# Patient Record
Sex: MALE | Race: BLACK OR AFRICAN AMERICAN | NOT HISPANIC OR LATINO | URBAN - METROPOLITAN AREA
[De-identification: names, ages, dates, MRNs, and addresses within clinical notes are randomized per-mention and may not be internally consistent; named-entity substitution may affect disease eponyms.]

---

## 2023-12-11 ENCOUNTER — OUTPATIENT (OUTPATIENT)
Dept: OUTPATIENT SERVICES | Facility: HOSPITAL | Age: 83
LOS: 1 days | Discharge: STILL ADMITTED OR ADMINISTRATIVE | End: 2023-12-11

## 2023-12-11 DIAGNOSIS — F60.0 PARANOID PERSONALITY DISORDER: ICD-10-CM

## 2024-01-12 ENCOUNTER — OUTPATIENT (OUTPATIENT)
Dept: OUTPATIENT SERVICES | Facility: HOSPITAL | Age: 84
LOS: 1 days | Discharge: HOME | End: 2024-01-12
Payer: MEDICARE

## 2024-01-12 PROCEDURE — 90792 PSYCH DIAG EVAL W/MED SRVCS: CPT

## 2024-02-01 PROCEDURE — 99204 OFFICE O/P NEW MOD 45 MIN: CPT

## 2024-02-18 ENCOUNTER — INPATIENT (INPATIENT)
Facility: HOSPITAL | Age: 84
LOS: 17 days | Discharge: NURSING, REHAB OR LONG-TERM CARE | End: 2024-03-07
Attending: INTERNAL MEDICINE | Admitting: INTERNAL MEDICINE
Payer: MEDICARE

## 2024-02-18 VITALS
OXYGEN SATURATION: 98 % | SYSTOLIC BLOOD PRESSURE: 176 MMHG | HEART RATE: 86 BPM | RESPIRATION RATE: 20 BRPM | TEMPERATURE: 98 F | DIASTOLIC BLOOD PRESSURE: 94 MMHG

## 2024-02-18 LAB
ALBUMIN SERPL ELPH-MCNC: 3.7 G/DL — SIGNIFICANT CHANGE UP (ref 3.3–5)
ALP SERPL-CCNC: 106 U/L — SIGNIFICANT CHANGE UP (ref 40–120)
ALT FLD-CCNC: 18 U/L — SIGNIFICANT CHANGE UP (ref 4–41)
ANION GAP SERPL CALC-SCNC: 12 MMOL/L — SIGNIFICANT CHANGE UP (ref 7–14)
APPEARANCE UR: CLEAR — SIGNIFICANT CHANGE UP
AST SERPL-CCNC: 21 U/L — SIGNIFICANT CHANGE UP (ref 4–40)
BACTERIA # UR AUTO: NEGATIVE /HPF — SIGNIFICANT CHANGE UP
BASOPHILS # BLD AUTO: 0.05 K/UL — SIGNIFICANT CHANGE UP (ref 0–0.2)
BASOPHILS NFR BLD AUTO: 0.6 % — SIGNIFICANT CHANGE UP (ref 0–2)
BILIRUB SERPL-MCNC: 0.3 MG/DL — SIGNIFICANT CHANGE UP (ref 0.2–1.2)
BILIRUB UR-MCNC: NEGATIVE — SIGNIFICANT CHANGE UP
BUN SERPL-MCNC: 18 MG/DL — SIGNIFICANT CHANGE UP (ref 7–23)
CALCIUM SERPL-MCNC: 9.2 MG/DL — SIGNIFICANT CHANGE UP (ref 8.4–10.5)
CAST: 0 /LPF — SIGNIFICANT CHANGE UP (ref 0–4)
CHLORIDE SERPL-SCNC: 106 MMOL/L — SIGNIFICANT CHANGE UP (ref 98–107)
CO2 SERPL-SCNC: 24 MMOL/L — SIGNIFICANT CHANGE UP (ref 22–31)
COLOR SPEC: YELLOW — SIGNIFICANT CHANGE UP
CREAT SERPL-MCNC: 1.05 MG/DL — SIGNIFICANT CHANGE UP (ref 0.5–1.3)
DIFF PNL FLD: NEGATIVE — SIGNIFICANT CHANGE UP
EGFR: 71 ML/MIN/1.73M2 — SIGNIFICANT CHANGE UP
EOSINOPHIL # BLD AUTO: 0.28 K/UL — SIGNIFICANT CHANGE UP (ref 0–0.5)
EOSINOPHIL NFR BLD AUTO: 3.2 % — SIGNIFICANT CHANGE UP (ref 0–6)
FLUAV AG NPH QL: SIGNIFICANT CHANGE UP
FLUBV AG NPH QL: SIGNIFICANT CHANGE UP
GLUCOSE SERPL-MCNC: 94 MG/DL — SIGNIFICANT CHANGE UP (ref 70–99)
GLUCOSE UR QL: NEGATIVE MG/DL — SIGNIFICANT CHANGE UP
HCT VFR BLD CALC: 40.9 % — SIGNIFICANT CHANGE UP (ref 39–50)
HGB BLD-MCNC: 13.2 G/DL — SIGNIFICANT CHANGE UP (ref 13–17)
IANC: 6.27 K/UL — SIGNIFICANT CHANGE UP (ref 1.8–7.4)
IMM GRANULOCYTES NFR BLD AUTO: 0.2 % — SIGNIFICANT CHANGE UP (ref 0–0.9)
KETONES UR-MCNC: NEGATIVE MG/DL — SIGNIFICANT CHANGE UP
LEUKOCYTE ESTERASE UR-ACNC: NEGATIVE — SIGNIFICANT CHANGE UP
LYMPHOCYTES # BLD AUTO: 1.28 K/UL — SIGNIFICANT CHANGE UP (ref 1–3.3)
LYMPHOCYTES # BLD AUTO: 14.8 % — SIGNIFICANT CHANGE UP (ref 13–44)
MCHC RBC-ENTMCNC: 29.4 PG — SIGNIFICANT CHANGE UP (ref 27–34)
MCHC RBC-ENTMCNC: 32.3 GM/DL — SIGNIFICANT CHANGE UP (ref 32–36)
MCV RBC AUTO: 91.1 FL — SIGNIFICANT CHANGE UP (ref 80–100)
MONOCYTES # BLD AUTO: 0.76 K/UL — SIGNIFICANT CHANGE UP (ref 0–0.9)
MONOCYTES NFR BLD AUTO: 8.8 % — SIGNIFICANT CHANGE UP (ref 2–14)
NEUTROPHILS # BLD AUTO: 6.27 K/UL — SIGNIFICANT CHANGE UP (ref 1.8–7.4)
NEUTROPHILS NFR BLD AUTO: 72.4 % — SIGNIFICANT CHANGE UP (ref 43–77)
NITRITE UR-MCNC: NEGATIVE — SIGNIFICANT CHANGE UP
NRBC # BLD: 0 /100 WBCS — SIGNIFICANT CHANGE UP (ref 0–0)
NRBC # FLD: 0 K/UL — SIGNIFICANT CHANGE UP (ref 0–0)
PH UR: 7 — SIGNIFICANT CHANGE UP (ref 5–8)
PLATELET # BLD AUTO: 191 K/UL — SIGNIFICANT CHANGE UP (ref 150–400)
POTASSIUM SERPL-MCNC: 4.5 MMOL/L — SIGNIFICANT CHANGE UP (ref 3.5–5.3)
POTASSIUM SERPL-SCNC: 4.5 MMOL/L — SIGNIFICANT CHANGE UP (ref 3.5–5.3)
PROT SERPL-MCNC: 6.9 G/DL — SIGNIFICANT CHANGE UP (ref 6–8.3)
PROT UR-MCNC: 30 MG/DL
RBC # BLD: 4.49 M/UL — SIGNIFICANT CHANGE UP (ref 4.2–5.8)
RBC # FLD: 13.6 % — SIGNIFICANT CHANGE UP (ref 10.3–14.5)
RBC CASTS # UR COMP ASSIST: 1 /HPF — SIGNIFICANT CHANGE UP (ref 0–4)
RSV RNA NPH QL NAA+NON-PROBE: SIGNIFICANT CHANGE UP
SARS-COV-2 RNA SPEC QL NAA+PROBE: SIGNIFICANT CHANGE UP
SODIUM SERPL-SCNC: 142 MMOL/L — SIGNIFICANT CHANGE UP (ref 135–145)
SP GR SPEC: 1.01 — SIGNIFICANT CHANGE UP (ref 1–1.03)
SQUAMOUS # UR AUTO: 0 /HPF — SIGNIFICANT CHANGE UP (ref 0–5)
UROBILINOGEN FLD QL: 0.2 MG/DL — SIGNIFICANT CHANGE UP (ref 0.2–1)
WBC # BLD: 8.66 K/UL — SIGNIFICANT CHANGE UP (ref 3.8–10.5)
WBC # FLD AUTO: 8.66 K/UL — SIGNIFICANT CHANGE UP (ref 3.8–10.5)
WBC UR QL: 0 /HPF — SIGNIFICANT CHANGE UP (ref 0–5)

## 2024-02-18 PROCEDURE — 70450 CT HEAD/BRAIN W/O DYE: CPT | Mod: 26,MA

## 2024-02-18 PROCEDURE — 71045 X-RAY EXAM CHEST 1 VIEW: CPT | Mod: 26

## 2024-02-18 PROCEDURE — 99285 EMERGENCY DEPT VISIT HI MDM: CPT

## 2024-02-18 RX ORDER — LEVETIRACETAM 250 MG/1
2500 TABLET, FILM COATED ORAL ONCE
Refills: 0 | Status: DISCONTINUED | OUTPATIENT
Start: 2024-02-18 | End: 2024-02-18

## 2024-02-18 NOTE — ED ADULT NURSE NOTE - NS ED NURSE RECORD ANOTHER HT AND WT
Pt reports to ED c/c sinus pressure, HA, and bilateral burning to eyes. PT denies taking any medication for pain. No

## 2024-02-18 NOTE — ED ADULT NURSE NOTE - CHIEF COMPLAINT QUOTE
Pt AOX3, Hx of dementia; arrives from [facility] due to new onset aggressive behavior and lack of sleep; pt c/o intermittent chest pain/discomfort in past few days; according to aide he has not eaten properly or slept properly in 48 hrs; pt ambulates with walker; denies any urinary changes; pt has redness in Left eye s/p cataract surgery approx 2 weeks ago; pt calm and cooperative in triage, bgl 101  Contact Son Regino Jalloh

## 2024-02-18 NOTE — ED ADULT TRIAGE NOTE - CHIEF COMPLAINT QUOTE
Pt AOX3, Hx of dementia; arrives from [facility] due to new onset aggressive behavior and lack of sleep; pt c/o intermittent chest pain/discomfort in past few days; according to aide he has not eaten properly or slept properly in 48 hrs; pt ambulates with walker; denies any urinary changes; pt calm and cooperative in triage, bgl 146  Contact Son Regino Jalloh  Pt AOX3, Hx of dementia; arrives from [facility] due to new onset aggressive behavior and lack of sleep; pt c/o intermittent chest pain/discomfort in past few days; according to aide he has not eaten properly or slept properly in 48 hrs; pt ambulates with walker; denies any urinary changes; pt calm and cooperative in triage, bgl 101  Contact Son Regino Jalloh  Pt AOX3, Hx of dementia; arrives from [facility] due to new onset aggressive behavior and lack of sleep; pt c/o intermittent chest pain/discomfort in past few days; according to aide he has not eaten properly or slept properly in 48 hrs; pt ambulates with walker; denies any urinary changes; pt has redness in Left eye s/p cataract surgery approx 2 weeks ago; pt calm and cooperative in triage, bgl 101  Contact Son Regino Jalloh

## 2024-02-18 NOTE — ED PROVIDER NOTE - CARE PLAN
Principal Discharge DX:	Abdominal pain   1 Principal Discharge DX:	Agitation   Principal Discharge DX:	Agitation  Secondary Diagnosis:	Dementia

## 2024-02-18 NOTE — ED PROVIDER NOTE - PROGRESS NOTE DETAILS
Jesse BURCIAGA, PGY-1;    Received signoutu on this patient.  83-year-old male history of dementia and hypertension presenting with agitation.  Denies baseline per nursing report.  Awaiting CT read and UA.  Ultimately may require psych eval.  Evaluated patient is not currently agitated, vitals within normal limits. Jesse BURCIAGA, PGY-1;    Received sign out on this patient.  83-year-old male history of dementia and hypertension presenting with agitation.  Denies baseline per nursing report.  Awaiting CT read and UA.  Ultimately may require psych eval.  Evaluated patient is not currently agitated, vitals within normal limits. Jesse BURCIAGA, PGY-1;    Discussed case with surgery, will admit to their service, potential plan for ERCP. Jesse BURCIAGA, PGY-1;    Discussed case with GI, will evaluate patient in AM Jesse BURCIAGA, PGY-1;    Patient would benefit for admission due to being sent in due to agitation from psychiatrist.

## 2024-02-18 NOTE — ED PROVIDER NOTE - PHYSICAL EXAMINATION
Constitutional: VS reviewed. Alert and orientedx2, well appearing, no apparent distress  HEENT: Atraumatic, EOMI, PERRL, + subconjunctival hemorrhage  CV: RRR  Lungs: Clear and equal bilaterally, no wheezes, rales or crackles  Abdomen: Soft, nondistended, nontender  MSK: No deformities  Skin: Warm and dry. As visualized no rashes, lesions, bruising or erythema  Neuro: Strength 5/5 in all extremities. Sensation intact.   Lymph: No pitting edema in extremities.

## 2024-02-18 NOTE — ED ADULT NURSE NOTE - NSFALLUNIVINTERV_ED_ALL_ED
Bed/Stretcher in lowest position, wheels locked, appropriate side rails in place/Call bell, personal items and telephone in reach/Instruct patient to call for assistance before getting out of bed/chair/stretcher/Non-slip footwear applied when patient is off stretcher/[place] to call system/Physically safe environment - no spills, clutter or unnecessary equipment/Purposeful proactive rounding/Room/bathroom lighting operational, light cord in reach

## 2024-02-18 NOTE — ED PROVIDER NOTE - OBJECTIVE STATEMENT
84 y/o M dementia, cataracts s/p cataract removal surgery 2 weeks presents to ED from the [facility] for 3 days of agitation. Pt is accompanied by aide who is helping to provide history. She states that patient has had intermittent episodes of agitation and aggression over last few days. Pt kicks and yells which is not patient's normal behavior. Pt has been eating normally. No n/v, diarrhea/constipation.     Spoke with son Regino Jalloh who states that patient has been having about 1-2 weeks of insomnia. He has endorses the agitation that the aide endorses. He also states that the patient had witnessed fall at nursing home. Pt is on Seroquel at night for last 2 months. Pt is followed by Dr. Mahan, geriatric psychiatrist at [facility]
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
## 2024-02-18 NOTE — ED PROVIDER NOTE - CLINICAL SUMMARY MEDICAL DECISION MAKING FREE TEXT BOX
82 y/o M dementia, cataracts s/p cataract removal surgery 2 weeks presents to ED from the [facility] for 3 days of agitation. Pt has had episodes of aggression. Per son pt also having increased insomnia over last 1 week. Pt well appearing. No focal neuro deficits. Differentials include but not limited to infection, UTI, worsening dementia. Plan for labs, CTH, UA/UC. Will consider psych eval. Dispo pending labs and imaging.
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
## 2024-02-19 DIAGNOSIS — H26.9 UNSPECIFIED CATARACT: ICD-10-CM

## 2024-02-19 DIAGNOSIS — I10 ESSENTIAL (PRIMARY) HYPERTENSION: ICD-10-CM

## 2024-02-19 DIAGNOSIS — Z98.49 CATARACT EXTRACTION STATUS, UNSPECIFIED EYE: Chronic | ICD-10-CM

## 2024-02-19 DIAGNOSIS — G47.00 INSOMNIA, UNSPECIFIED: ICD-10-CM

## 2024-02-19 DIAGNOSIS — R10.9 UNSPECIFIED ABDOMINAL PAIN: ICD-10-CM

## 2024-02-19 DIAGNOSIS — F03.90 UNSPECIFIED DEMENTIA WITHOUT BEHAVIORAL DISTURBANCE: ICD-10-CM

## 2024-02-19 DIAGNOSIS — Z29.9 ENCOUNTER FOR PROPHYLACTIC MEASURES, UNSPECIFIED: ICD-10-CM
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
## 2024-02-19 DIAGNOSIS — R03.0 ELEVATED BLOOD-PRESSURE READING, WITHOUT DIAGNOSIS OF HYPERTENSION: ICD-10-CM

## 2024-02-19 DIAGNOSIS — E86.0 DEHYDRATION: ICD-10-CM

## 2024-02-19 DIAGNOSIS — R41.82 ALTERED MENTAL STATUS, UNSPECIFIED: ICD-10-CM

## 2024-02-19 DIAGNOSIS — G93.40 ENCEPHALOPATHY, UNSPECIFIED: ICD-10-CM

## 2024-02-19 LAB
24R-OH-CALCIDIOL SERPL-MCNC: 32.8 NG/ML — SIGNIFICANT CHANGE UP (ref 30–80)
A1C WITH ESTIMATED AVERAGE GLUCOSE RESULT: 5.3 % — SIGNIFICANT CHANGE UP (ref 4–5.6)
ANION GAP SERPL CALC-SCNC: 11 MMOL/L — SIGNIFICANT CHANGE UP (ref 7–14)
BUN SERPL-MCNC: 14 MG/DL — SIGNIFICANT CHANGE UP (ref 7–23)
CALCIUM SERPL-MCNC: 9.2 MG/DL — SIGNIFICANT CHANGE UP (ref 8.4–10.5)
CHLORIDE SERPL-SCNC: 108 MMOL/L — HIGH (ref 98–107)
CHOLEST SERPL-MCNC: 200 MG/DL — HIGH
CO2 SERPL-SCNC: 23 MMOL/L — SIGNIFICANT CHANGE UP (ref 22–31)
CREAT SERPL-MCNC: 0.94 MG/DL — SIGNIFICANT CHANGE UP (ref 0.5–1.3)
CULTURE RESULTS: SIGNIFICANT CHANGE UP
EGFR: 81 ML/MIN/1.73M2 — SIGNIFICANT CHANGE UP
ESTIMATED AVERAGE GLUCOSE: 105 — SIGNIFICANT CHANGE UP
GLUCOSE BLDC GLUCOMTR-MCNC: 104 MG/DL — HIGH (ref 70–99)
GLUCOSE SERPL-MCNC: 98 MG/DL — SIGNIFICANT CHANGE UP (ref 70–99)
HDLC SERPL-MCNC: 81 MG/DL — SIGNIFICANT CHANGE UP
LIPID PNL WITH DIRECT LDL SERPL: 108 MG/DL — HIGH
MAGNESIUM SERPL-MCNC: 1.9 MG/DL — SIGNIFICANT CHANGE UP (ref 1.6–2.6)
NON HDL CHOLESTEROL: 119 MG/DL — SIGNIFICANT CHANGE UP
PHOSPHATE SERPL-MCNC: 3.2 MG/DL — SIGNIFICANT CHANGE UP (ref 2.5–4.5)
POTASSIUM SERPL-MCNC: 4.4 MMOL/L — SIGNIFICANT CHANGE UP (ref 3.5–5.3)
POTASSIUM SERPL-SCNC: 4.4 MMOL/L — SIGNIFICANT CHANGE UP (ref 3.5–5.3)
SODIUM SERPL-SCNC: 142 MMOL/L — SIGNIFICANT CHANGE UP (ref 135–145)
SPECIMEN SOURCE: SIGNIFICANT CHANGE UP
TRIGL SERPL-MCNC: 56 MG/DL — SIGNIFICANT CHANGE UP
TSH SERPL-MCNC: 2.8 UIU/ML — SIGNIFICANT CHANGE UP (ref 0.27–4.2)

## 2024-02-19 PROCEDURE — G0316 PROLONG INPT EVAL ADD15 M: CPT

## 2024-02-19 PROCEDURE — 99223 1ST HOSP IP/OBS HIGH 75: CPT | Mod: GC

## 2024-02-19 RX ORDER — SIMVASTATIN 20 MG/1
1 TABLET, FILM COATED ORAL
Refills: 0 | DISCHARGE

## 2024-02-19 RX ORDER — PREDNISOLONE SODIUM PHOSPHATE 1 %
1 DROPS OPHTHALMIC (EYE) DAILY
Refills: 0 | Status: COMPLETED | OUTPATIENT
Start: 2024-02-19 | End: 2024-02-21

## 2024-02-19 RX ORDER — LORATADINE 10 MG/1
1 TABLET ORAL
Refills: 0 | DISCHARGE

## 2024-02-19 RX ORDER — QUETIAPINE FUMARATE 200 MG/1
25 TABLET, FILM COATED ORAL ONCE
Refills: 0 | Status: COMPLETED | OUTPATIENT
Start: 2024-02-19 | End: 2024-02-19

## 2024-02-19 RX ORDER — SODIUM CHLORIDE 9 MG/ML
1000 INJECTION, SOLUTION INTRAVENOUS ONCE
Refills: 0 | Status: COMPLETED | OUTPATIENT
Start: 2024-02-19 | End: 2024-02-19

## 2024-02-19 RX ORDER — PETROLATUM,WHITE
1 JELLY (GRAM) TOPICAL
Refills: 0 | Status: DISCONTINUED | OUTPATIENT
Start: 2024-02-19 | End: 2024-03-07

## 2024-02-19 RX ORDER — SENNA PLUS 8.6 MG/1
2 TABLET ORAL
Refills: 0 | DISCHARGE

## 2024-02-19 RX ORDER — LANOLIN ALCOHOL/MO/W.PET/CERES
1 CREAM (GRAM) TOPICAL
Refills: 0 | DISCHARGE

## 2024-02-19 RX ORDER — AMLODIPINE BESYLATE 2.5 MG/1
5 TABLET ORAL DAILY
Refills: 0 | Status: DISCONTINUED | OUTPATIENT
Start: 2024-02-19 | End: 2024-03-07

## 2024-02-19 RX ORDER — LANOLIN ALCOHOL/MO/W.PET/CERES
3 CREAM (GRAM) TOPICAL AT BEDTIME
Refills: 0 | Status: DISCONTINUED | OUTPATIENT
Start: 2024-02-19 | End: 2024-03-07

## 2024-02-19 RX ORDER — FOLIC ACID 0.8 MG
1 TABLET ORAL DAILY
Refills: 0 | Status: DISCONTINUED | OUTPATIENT
Start: 2024-02-19 | End: 2024-03-07

## 2024-02-19 RX ORDER — FOLIC ACID 0.8 MG
1 TABLET ORAL
Refills: 0 | DISCHARGE

## 2024-02-19 RX ORDER — QUETIAPINE FUMARATE 200 MG/1
25 TABLET, FILM COATED ORAL AT BEDTIME
Refills: 0 | Status: DISCONTINUED | OUTPATIENT
Start: 2024-02-19 | End: 2024-02-20

## 2024-02-19 RX ORDER — ENOXAPARIN SODIUM 100 MG/ML
40 INJECTION SUBCUTANEOUS EVERY 24 HOURS
Refills: 0 | Status: DISCONTINUED | OUTPATIENT
Start: 2024-02-19 | End: 2024-03-07

## 2024-02-19 RX ORDER — SODIUM CHLORIDE 9 MG/ML
1000 INJECTION INTRAMUSCULAR; INTRAVENOUS; SUBCUTANEOUS
Refills: 0 | Status: DISCONTINUED | OUTPATIENT
Start: 2024-02-19 | End: 2024-02-20

## 2024-02-19 RX ORDER — SENNA PLUS 8.6 MG/1
2 TABLET ORAL AT BEDTIME
Refills: 0 | Status: DISCONTINUED | OUTPATIENT
Start: 2024-02-19 | End: 2024-03-07

## 2024-02-19 RX ORDER — MEMANTINE HYDROCHLORIDE 10 MG/1
5 TABLET ORAL DAILY
Refills: 0 | Status: DISCONTINUED | OUTPATIENT
Start: 2024-02-19 | End: 2024-03-07

## 2024-02-19 RX ORDER — POLYETHYLENE GLYCOL 3350 17 G/17G
17 POWDER, FOR SOLUTION ORAL
Refills: 0 | DISCHARGE

## 2024-02-19 RX ORDER — MEMANTINE HYDROCHLORIDE 10 MG/1
1 TABLET ORAL
Refills: 0 | DISCHARGE

## 2024-02-19 RX ADMIN — SODIUM CHLORIDE 75 MILLILITER(S): 9 INJECTION INTRAMUSCULAR; INTRAVENOUS; SUBCUTANEOUS at 21:00

## 2024-02-19 RX ADMIN — SENNA PLUS 2 TABLET(S): 8.6 TABLET ORAL at 21:01

## 2024-02-19 RX ADMIN — Medication 1 APPLICATION(S): at 20:22

## 2024-02-19 RX ADMIN — SODIUM CHLORIDE 500 MILLILITER(S): 9 INJECTION, SOLUTION INTRAVENOUS at 06:21

## 2024-02-19 RX ADMIN — QUETIAPINE FUMARATE 25 MILLIGRAM(S): 200 TABLET, FILM COATED ORAL at 21:02

## 2024-02-19 RX ADMIN — QUETIAPINE FUMARATE 25 MILLIGRAM(S): 200 TABLET, FILM COATED ORAL at 01:16

## 2024-02-19 RX ADMIN — MEMANTINE HYDROCHLORIDE 5 MILLIGRAM(S): 10 TABLET ORAL at 12:08

## 2024-02-19 RX ADMIN — Medication 1 MILLIGRAM(S): at 12:08

## 2024-02-19 RX ADMIN — SODIUM CHLORIDE 75 MILLILITER(S): 9 INJECTION INTRAMUSCULAR; INTRAVENOUS; SUBCUTANEOUS at 17:30

## 2024-02-19 RX ADMIN — Medication 2 DROP(S): at 17:25

## 2024-02-19 RX ADMIN — Medication 1 DROP(S): at 12:08

## 2024-02-19 RX ADMIN — Medication 1 APPLICATION(S): at 21:02

## 2024-02-19 RX ADMIN — Medication 3 MILLIGRAM(S): at 21:02

## 2024-02-19 RX ADMIN — SODIUM CHLORIDE 500 MILLILITER(S): 9 INJECTION, SOLUTION INTRAVENOUS at 09:12

## 2024-02-19 NOTE — PROGRESS NOTE ADULT - PROBLEM SELECTOR PLAN 8
DVT: Lovenox   Diet: DASH with Regular Consistency  Pharmacy:  Dispo: medically active, f/u Psych recs if requiring [facility] admission DVT: Lovenox   Diet: DASH with Regular Consistency  Pharmacy:  Dispo: medically active, f/u Psych recs if requiring [facility] admission    Discussed with son Regino and TAYEA 2/18.

## 2024-02-19 NOTE — H&P ADULT - PROBLEM SELECTOR PLAN 3
Hx of Dementia, possible Alzheimer's.     > c/w home Memantine and Seroquel as above. Hx of Dementia, possible Alzheimer's. Follow with [facility] Dr. Mahan at [facility].     > c/w home Memantine and Seroquel as above. Report of insomnia for the past ~ 2 weeks, although patient refutes this and states he sleeps 24 hours  - likelihood that this is contributing to the agitation/acute encephalopathy  - unclear if insomnia is being triggered by some other issue, for example, eye pain/discomfort, constipation, headache (since patient has dementia and may not be able to recall irritants)    > Ophthalmology consult in the AM (s/p cataract surgeries recently, per reports)  > F/up AM lab-work  > f/u TSH, vitamin D level  > continuing with psychotropic medications  > trial of melatonin

## 2024-02-19 NOTE — H&P ADULT - PROBLEM SELECTOR PLAN 8
DVT: Lovenox   Diet: DASH with Regular Consistency  Pharmacy:  Dispo: medically active, f/u psych recs if requiring [facility] admission

## 2024-02-19 NOTE — PATIENT PROFILE ADULT - FALL HARM RISK - HARM RISK INTERVENTIONS
Assistance with ambulation/Assistance OOB with selected safe patient handling equipment/Communicate Risk of Fall with Harm to all staff/Discuss with provider need for PT consult/Monitor gait and stability/Reinforce activity limits and safety measures with patient and family/Tailored Fall Risk Interventions/Visual Cue: Yellow wristband and red socks/Bed in lowest position, wheels locked, appropriate side rails in place/Call bell, personal items and telephone in reach/Instruct patient to call for assistance before getting out of bed or chair/Non-slip footwear when patient is out of bed/[place] to call system/Physically safe environment - no spills, clutter or unnecessary equipment/Purposeful Proactive Rounding/Room/bathroom lighting operational, light cord in reach
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
## 2024-02-19 NOTE — PHYSICAL THERAPY INITIAL EVALUATION ADULT - GENERAL OBSERVATIONS, REHAB EVAL
Chart reviewed and cleared for PT by covering RN. Pt received semi supine in bed in NAD, all lines intact +IV, pts aide at bedside, HR 70s.

## 2024-02-19 NOTE — H&P ADULT - PROBLEM SELECTOR PLAN 5
Hx of recent cataract surgery on 1/20/24.  - Per [facility] med rec, has completed Ketorolac eye drops and Oflaxacin eyedrops  - Was also perscribed Prednisolone acetate 1% drops, unclear if completed. Initial script was for 30 days starting on 1/20.     > will continue 1-2 days of Prednisolone drops based on med rec. Hx of recent cataract surgery on 1/20/24.  - Per NH med rec, has completed Ketorolac eye drops and Oflaxacin eyedrops  - Was also prescribed Prednisolone acetate 1% drops, unclear if completed. Initial script was for 30 days starting on 1/20.     > will continue 1-2 days of Prednisolone drops based on med rec. Hx of recent cataract surgery on 1/20/24 with left eye erythema on exam.   - Per NH med rec, has completed Ketorolac eye drops and Oflaxacin eyedrops  - Was also prescribed Prednisolone acetate 1% drops, unclear if completed. Initial script was for 30 days starting on 1/20.     > will continue 1-2 days of Prednisolone drops based on med rec.  > optho c/s in AM as above Very dry oral mucosa.  Lab-work appears hemoconcentrated  - likelihood that patient has not been drinking fluids, in the context of mental status changes    > IVF prescribed; one liter LR.  Will likely need additional IVF  > encourage oral hydration, as tolerated  > f/u electrolytes, renal function and for signs of clinical improvement/resolution  > f/u intake/output Q4H

## 2024-02-19 NOTE — H&P ADULT - NSHPSOCIALHISTORY_GEN_ALL_CORE
Lives at [facility]. Previously a ? Lives at [facility]. Previously a ? (per patient)

## 2024-02-19 NOTE — H&P ADULT - PROBLEM SELECTOR PLAN 4
Hx of HTN but does not appear to be on anti-hypertensives on med rec.   - BP initially in 170s/90s on admission    > continue to monitor, would consider starting antihypertensive if persistently high with ACE/ARB. Hx of HTN but does not appear to be on anti-hypertensives on med rec.   - BP initially in 170s/90s on admission    > continue to monitor, would consider starting antihypertensive if persistently high consider amlodipine 5mg qD. Hx of Dementia, possible Alzheimer's. Follow with [facility] Dr. Mahan at [facility].     > c/w home Memantine and Seroquel as above  > QTc currently 420  > f/u Psychiatry/BH consult.

## 2024-02-19 NOTE — H&P ADULT - ATTENDING COMMENTS
83 year old male, with past history of Dementia, Hypertension (not on meds), Gait difficulty (uses walker) and Xerotic dermatitis, presented to the ED secondary to increasing agitation,  Being managed/evaluated for Acute encephalopathy, Agitation, Dehydration, Fall, Cataract (recent surgery).  No gross signs of infection.  Possibility that encephalopathy/agitation could be influenced by worsening dementia, but consideration also that patient may be experiencing discomfort for which he is unable to complain/report.  Insomnia reported x ~ 2 weeks, and history of cataract surgery ~ 2 weeks ago.  L-eye erythematous.  Will benefit from Ophthalmology consult (please call).  Would also get Psychiatry/ consult (please call).  Continuing with psychotropic medications for now.  Melatonin trial for insomnia.  Ensure optimal hydration (patient appears very dehydrated and has hemoconcentrated lab-work).  F/up electrolytes, renal function and for signs of clinical resolution.  With report of fall, CT scan of head negative; Physical therapy evaluation.  Ambulation with walker, with assistance, as tolerated.  Fall precautions.  Elevated blood pressure is likely aggravated by stress/agitation.  If persistently elevated, could give amlodipine 5 mg.  F/up AM lab-work.    All other management as prescribed.

## 2024-02-19 NOTE — H&P ADULT - PROBLEM SELECTOR PLAN 7
Hx of recent cataract surgery on 1/20/24 with left eye erythema on exam.   Report, however, of bilateral cataract surgery in the recent past  - Per [facility] med rec, has completed Ketorolac eye drops and Ofloxacin eyedrops  - Was also prescribed Prednisolone acetate 1% drops, unclear if completed. Initial script was for 30 days starting on 1/20.     > will continue 1-2 days of Prednisolone drops based on med rec.  > ophthalmology c/s in AM as above (please discuss medications w/ Opthalmology Team)

## 2024-02-19 NOTE — H&P ADULT - NSHPPHYSICALEXAM_GEN_ALL_CORE
.  VITAL SIGNS:  T(C): 36.8 (02-19-24 @ 01:31), Max: 37.5 (02-18-24 @ 18:07)  T(F): 98.2 (02-19-24 @ 01:31), Max: 99.5 (02-18-24 @ 18:07)  HR: 85 (02-19-24 @ 02:36) (74 - 86)  BP: 136/85 (02-19-24 @ 02:36) (136/85 - 176/94)  BP(mean): 132 (02-19-24 @ 01:31) (132 - 132)  RR: 16 (02-19-24 @ 02:36) (16 - 20)  SpO2: 99% (02-19-24 @ 02:36) (98% - 100%)  Wt(kg): --    PHYSICAL EXAM:  GENERAL: NAD, well-groomed, well-developed  HEAD:  Atraumatic, Normocephalic  EYES: EOMI, PERRLA, conjunctiva and sclera clear  ENMT: No tonsillar erythema, exudates, or enlargement; Moist mucous membranes, Good dentition, No lesions  NECK: Supple, No JVD, Normal thyroid  CHEST/LUNG: Clear to percussion bilaterally; No rales, rhonchi, wheezing, or rubs  HEART: Regular rate and rhythm; No murmurs, rubs, or gallops  ABDOMEN: Soft, Nontender, Nondistended; Bowel sounds present  VASCULAR:  No clubbing, cyanosis, or edema  LYMPH: No lymphadenopathy noted  SKIN: No rashes or lesions  NERVOUS SYSTEM:  Alert & Oriented X1 (oriented to self), Poor concentration; Poor memory,   PSYCH: Laughing inappropriately .  VITAL SIGNS:  T(C): 36.8 (02-19-24 @ 01:31), Max: 37.5 (02-18-24 @ 18:07)  T(F): 98.2 (02-19-24 @ 01:31), Max: 99.5 (02-18-24 @ 18:07)  HR: 85 (02-19-24 @ 02:36) (74 - 86)  BP: 136/85 (02-19-24 @ 02:36) (136/85 - 176/94)  BP(mean): 132 (02-19-24 @ 01:31) (132 - 132)  RR: 16 (02-19-24 @ 02:36) (16 - 20)  SpO2: 99% (02-19-24 @ 02:36) (98% - 100%)  Wt(kg): --    PHYSICAL EXAM:  GENERAL: NAD, not aggressive,  HEAD:  Atraumatic, Normocephalic  EYES: EOMI, PERRLA, conjunctiva and sclera clear  ENMT: Moist mucous membranes, Poor dentition  NECK: Supple, No JVD, Normal thyroid  CHEST/LUNG: Clear to percussion bilaterally; No rales, rhonchi, wheezing, or rubs  HEART: Regular rate and rhythm; No murmurs, rubs, or gallops  ABDOMEN: Soft, Nontender, Nondistended; Bowel sounds present  VASCULAR:  No clubbing, cyanosis, or edema  LYMPH: No lymphadenopathy noted  SKIN: No rashes or lesions  NERVOUS SYSTEM:  Alert & Oriented X1 (oriented to self), Poor concentration; Poor memory,   PSYCH: Laughing inappropriately .  VITAL SIGNS:  T(C): 36.8 (02-19-24 @ 01:31), Max: 37.5 (02-18-24 @ 18:07)  T(F): 98.2 (02-19-24 @ 01:31), Max: 99.5 (02-18-24 @ 18:07)  HR: 85 (02-19-24 @ 02:36) (74 - 86)  BP: 136/85 (02-19-24 @ 02:36) (136/85 - 176/94)  BP(mean): 132 (02-19-24 @ 01:31) (132 - 132)  RR: 16 (02-19-24 @ 02:36) (16 - 20)  SpO2: 99% (02-19-24 @ 02:36) (98% - 100%)  Wt(kg): --    PHYSICAL EXAM:  GENERAL: NAD, not aggressive, thin appearing  HEAD:  Atraumatic, Normocephalic  EYES: EOMI, PERRLA, left eye erythema  ENMT: dry mucous membranes, Poor dentition  NECK: Supple, No JVD, Normal thyroid  CHEST/LUNG: Clear to percussion bilaterally; No rales, rhonchi, wheezing, or rubs  HEART: Regular rate and rhythm; No murmurs, rubs, or gallops  ABDOMEN: Soft, Nontender, Nondistended; Bowel sounds present  VASCULAR:  No clubbing, cyanosis, or edema  LYMPH: No lymphadenopathy noted  SKIN: chronic dry skin appearing on LE  NERVOUS SYSTEM:  Alert & Oriented X1 (oriented to self), Poor concentration; Poor memory,   PSYCH: Laughing inappropriately .  VITAL SIGNS:  T(C): 36.8 (02-19-24 @ 01:31), Max: 37.5 (02-18-24 @ 18:07)  T(F): 98.2 (02-19-24 @ 01:31), Max: 99.5 (02-18-24 @ 18:07)  HR: 85 (02-19-24 @ 02:36) (74 - 86)  BP: 136/85 (02-19-24 @ 02:36) (136/85 - 176/94)  BP(mean): 132 (02-19-24 @ 01:31) (132 - 132)  RR: 16 (02-19-24 @ 02:36) (16 - 20)  SpO2: 99% (02-19-24 @ 02:36) (98% - 100%)  Wt(kg): --    PHYSICAL EXAM:  GENERAL: NAD, not aggressive, but becomes angry/upset toward the aide at bedside.  Thin appearing  HEAD:  Atraumatic, Normocephalic  EYES: EOMI, PERRL.  L-eye erythema  ENMT: Very dry mucous membranes, Fair dentition  NECK: Supple, No JVD.  No thyroid enlargement via palpation  CHEST/LUNG: Clear to percussion bilaterally; No rales, rhonchi, wheezing, or rubs  HEART: Regular rate and rhythm; No murmurs, rubs, or gallops  ABDOMEN: Soft, Nontender.  Nondistended. Bowel sounds present  VASCULAR:  No clubbing, cyanosis, or edema  LYMPH: No lymphadenopathy noted  SKIN: chronic dry skin appearing on LE  NERVOUS SYSTEM:  Alert & Oriented X1 (oriented to self), Poor concentration; Poor memory.  Intermittently upset.  Arguing, but at times loses train of thought  PSYCH: Laughing inappropriately at times

## 2024-02-19 NOTE — CONSULT NOTE ADULT - SUBJECTIVE AND OBJECTIVE BOX
[facility] DEPARTMENT OF OPHTHALMOLOGY - INITIAL ADULT CONSULT  ----------------------------------------------------------------------------------------------------  Casey Patten MD PGY-2  Available on teams  ----------------------------------------------------------------------------------------------------    HPI:  82 yo male with pmhx of Dementia (?Alzheimer's), cataracts s/p cataract surgery (on 24), hypertension, and xerotic dermatitis, p/w agitation for the past 2 days with 1 week of insomnia. Patient was evaluated at bedside but history could not be obtained from him due to poor memory and altered mentation but patient denied any acute pain and was A&O x1, oriented to self. History was obtained from [facility] Aide at bedside, reports patient had change in behavior over the past week, initially starting with insomnia but escalated to agitation and aggression at LT that began on  when the patient because more physically aggressive with staff, attempting to hit other staff. Per ED note, patient's son Regino Jalloh seems to endorse history provided by aide. Additionally, patient has been speaking more loudly and is generally disorganized. At baseline, patient is A&Ox1-2, oriented to self and , and refers to his LTC as "that place that people live". Has been at [facility] for the past 4 months. Per aide, patient is not typically aggressive but can laugh inappropriately and has poor memory. Per aide, patient does not appear to have had any acute complaints while at nursing home. Eating and drinking normally, has normal BM.     ED Course: given Seroquel 25mg (home dose), labs obtained, CT Head obtained.     Initial vital signs in ED as follows: BP = 196/94, HR = 86, RR = 20, T = 36.7 C (98 F), O2 Sat = 98% on RA. (2024 03:39)    Interval History: Spoke with pt's son. he stated that during cataract surgery OS in January the pt developed subconj heme in the left eye that has been improving. No eye complaints.    PAST MEDICAL & SURGICAL HISTORY:  Dementia      Hypertension      Xerotic eczema      Cataract      S/P cataract surgery        Past Ocular History: CE/PCIOL OU  Ophthalmic Medications: PF gtt  FAMILY HISTORY:    MEDICATIONS  (STANDING):  enoxaparin Injectable 40 milliGRAM(s) SubCutaneous every 24 hours  folic acid 1 milliGRAM(s) Oral daily  melatonin 3 milliGRAM(s) Oral at bedtime  memantine 5 milliGRAM(s) Oral daily  prednisoLONE acetate 1% Suspension 1 Drop(s) Left EYE daily  QUEtiapine 25 milliGRAM(s) Oral at bedtime  senna 2 Tablet(s) Oral at bedtime    MEDICATIONS  (PRN):    Allergies & Intolerances:   No Known Allergies    Review of Systems:  Constitutional: No fever, chills  Eyes: left eye redness  Neuro: No tremors  Cardiovascular: No chest pain, palpitations  Respiratory: No SOB, no cough  GI: No nausea, vomiting, abdominal pain  : No dysuria  Skin: no rash  Psych: no depression  Endocrine: no polyuria, polydipsia  Heme/lymph: no swelling    VITALS: T(C): 36.9 (24 @ 15:24)  T(F): 98.5 (24 @ 15:24), Max: 99.5 (24 @ 18:07)  HR: 83 (24 @ 15:24) (66 - 87)  BP: 175/85 (24 @ 15:24) (132/80 - 190/90)  RR:  (16 - 20)  SpO2:  (96% - 100%)  Wt(kg): --  General: AAO x 1-2, appropriate mood and affect    Ophthalmology Exam:  Visual acuity (cc): 20/20 OD, 20/20 OS  Pupils: PERRL OU, no APD  Ttono: 20 OD, 17 OS  Extraocular movements (EOMs): Full OU, no pain, no diplopia  Confrontational Visual Field (CVF): KATINA 2/2 poor cooperation    Pen Light Exam (PLE)  External: Flat OU  Lids/Lashes/Lacrimal Ducts: Flat OU    Sclera/Conjunctiva: W+Q OD, nasal subconj heme OS  Cornea: Cl OU  Anterior Chamber: D+F OU    Iris: Flat OU  Lens: Cl OU    Fundus Exam: dilated with 1% tropicamide and 2.5% phenylephrine  Approval obtained from [place] at 4:50pm for dilation  Patient aware that pupils can remained dilated for at least 4-6 hours  Exam performed with 20D lens    Vitreous: wnl OU  Disc, cup/disc: sharp and pink, 0.4 OU  Macula: wnl OU  Vessels: wnl OU  Periphery: wnl OU    Labs/Imaging:  ***   [facility] DEPARTMENT OF OPHTHALMOLOGY - INITIAL ADULT CONSULT  ----------------------------------------------------------------------------------------------------  Casey Patten MD PGY-2  Available on teams  ----------------------------------------------------------------------------------------------------    HPI:  84 yo male with pmhx of Dementia (?Alzheimer's), cataracts s/p cataract surgery (on 24), hypertension, and xerotic dermatitis, p/w agitation for the past 2 days with 1 week of insomnia. Patient was evaluated at bedside but history could not be obtained from him due to poor memory and altered mentation but patient denied any acute pain and was A&O x1, oriented to self. History was obtained from [facility] Aide at bedside, reports patient had change in behavior over the past week, initially starting with insomnia but escalated to agitation and aggression at LT that began on  when the patient because more physically aggressive with staff, attempting to hit other staff. Per ED note, patient's son Regino Jalloh seems to endorse history provided by aide. Additionally, patient has been speaking more loudly and is generally disorganized. At baseline, patient is A&Ox1-2, oriented to self and , and refers to his LTC as "that place that people live". Has been at [facility] for the past 4 months. Per aide, patient is not typically aggressive but can laugh inappropriately and has poor memory. Per aide, patient does not appear to have had any acute complaints while at nursing home. Eating and drinking normally, has normal BM.     ED Course: given Seroquel 25mg (home dose), labs obtained, CT Head obtained.     Initial vital signs in ED as follows: BP = 196/94, HR = 86, RR = 20, T = 36.7 C (98 F), O2 Sat = 98% on RA. (2024 03:39)    Interval History: Spoke with pt's son. he stated that during cataract surgery OS in January the pt developed subconj heme in the left eye that has been improving. No eye complaints.    PAST MEDICAL & SURGICAL HISTORY:  Dementia      Hypertension      Xerotic eczema      Cataract      S/P cataract surgery        Past Ocular History: CE/PCIOL OU  Ophthalmic Medications: PF gtt  FAMILY HISTORY:    MEDICATIONS  (STANDING):  enoxaparin Injectable 40 milliGRAM(s) SubCutaneous every 24 hours  folic acid 1 milliGRAM(s) Oral daily  melatonin 3 milliGRAM(s) Oral at bedtime  memantine 5 milliGRAM(s) Oral daily  prednisoLONE acetate 1% Suspension 1 Drop(s) Left EYE daily  QUEtiapine 25 milliGRAM(s) Oral at bedtime  senna 2 Tablet(s) Oral at bedtime    MEDICATIONS  (PRN):    Allergies & Intolerances:   No Known Allergies    Review of Systems:  Constitutional: No fever, chills  Eyes: left eye redness  Neuro: No tremors  Cardiovascular: No chest pain, palpitations  Respiratory: No SOB, no cough  GI: No nausea, vomiting, abdominal pain  : No dysuria  Skin: no rash  Psych: no depression  Endocrine: no polyuria, polydipsia  Heme/lymph: no swelling    VITALS: T(C): 36.9 (24 @ 15:24)  T(F): 98.5 (24 @ 15:24), Max: 99.5 (24 @ 18:07)  HR: 83 (24 @ 15:24) (66 - 87)  BP: 175/85 (24 @ 15:24) (132/80 - 190/90)  RR:  (16 - 20)  SpO2:  (96% - 100%)  Wt(kg): --  General: AAO x 1-2, appropriate mood and affect    Ophthalmology Exam:  Visual acuity (cc): 20/20 OD, 20/20 OS  Pupils: PERRL OU, no APD  Ttono: 20 OD, 17 OS  Extraocular movements (EOMs): Full OU, no pain, no diplopia  Confrontational Visual Field (CVF): KATINA 2/2 poor cooperation    Pen Light Exam (PLE)  External: Flat OU  Lids/Lashes/Lacrimal Ducts: Flat OU    Sclera/Conjunctiva: W+Q OD, nasal subconj heme OS  Cornea: Arcus OU  Anterior Chamber: D+F OU    Iris: Flat OU  Lens: IOL OU    Fundus Exam: dilated with 1% tropicamide and 2.5% phenylephrine  Approval obtained from [place] Soto at 4:50pm for dilation  Patient aware that pupils can remained dilated for at least 4-6 hours  Exam performed with 20D lens    Vitreous: PVD OU  Disc, cup/disc: PPA, 0.2 OU  Macula: wnl OU  Vessels: wnl OU  Periphery: Pigmentary changes OD, wnl OS    Labs/Imaging:  ***

## 2024-02-19 NOTE — H&P ADULT - NSHPLABSRESULTS_GEN_ALL_CORE
LABS:                         13.2   8.66  )-----------( 191      ( 2024 18:02 )             40.9     02-18    142  |  106  |  18  ----------------------------<  94  4.5   |  24  |  1.05    Ca    9.2      2024 18:02    TPro  6.9  /  Alb  3.7  /  TBili  0.3  /  DBili  x   /  AST  21  /  ALT  18  /  AlkPhos  106  02-18      Urinalysis Basic - ( 2024 19:32 )    Color: Yellow / Appearance: Clear / S.014 / pH: x  Gluc: x / Ketone: Negative mg/dL  / Bili: Negative / Urobili: 0.2 mg/dL   Blood: x / Protein: 30 mg/dL / Nitrite: Negative   Leuk Esterase: Negative / RBC: 1 /HPF / WBC 0 /HPF   Sq Epi: x / Non Sq Epi: 0 /HPF / Bacteria: Negative /HPF            RADIOLOGY, EKG & ADDITIONAL TESTS: Reviewed. LABS:                         13.2   8.66  )-----------( 191      ( 2024 18:02 )             40.9     02-18    142  |  106  |  18  ----------------------------<  94  4.5   |  24  |  1.05    Ca    9.2      2024 18:02    TPro  6.9  /  Alb  3.7  /  TBili  0.3  /  DBili  x   /  AST  21  /  ALT  18  /  AlkPhos  106  02-18      Urinalysis Basic - ( 2024 19:32 )    Color: Yellow / Appearance: Clear / S.014 / pH: x  Gluc: x / Ketone: Negative mg/dL  / Bili: Negative / Urobili: 0.2 mg/dL   Blood: x / Protein: 30 mg/dL / Nitrite: Negative   Leuk Esterase: Negative / RBC: 1 /HPF / WBC 0 /HPF   Sq Epi: x / Non Sq Epi: 0 /HPF / Bacteria: Negative /HPF            RADIOLOGY, EKG & ADDITIONAL TESTS: Reviewed. QTc 420 LABS:                         13.2   8.66  )-----------( 191      ( 2024 18:02 )             40.9     02-18    142  |  106  |  18  ----------------------------<  94  4.5   |  24  |  1.05    Ca    9.2      2024 18:02    TPro  6.9  /  Alb  3.7  /  TBili  0.3  /  DBili  x   /  AST  21  /  ALT  18  /  AlkPhos  106  02-18      Urinalysis Basic - ( 2024 19:32 )    Color: Yellow / Appearance: Clear / S.014 / pH: x  Gluc: x / Ketone: Negative mg/dL  / Bili: Negative / Urobili: 0.2 mg/dL   Blood: x / Protein: 30 mg/dL / Nitrite: Negative   Leuk Esterase: Negative / RBC: 1 /HPF / WBC 0 /HPF   Sq Epi: x / Non Sq Epi: 0 /HPF / Bacteria: Negative /HPF            RADIOLOGY, EKG & ADDITIONAL TESTS: Reviewed. QTc 420, NSR with T wave inversions in V1-V5 with RBBB.   Review outside LE Doppler done on 2/15/24, no venous occlusion noted, no significant arterial stenosis. LABS:                         13.2   8.66  )-----------( 191      ( 2024 18:02 )             40.9     02-18    142  |  106  |  18  ----------------------------<  94  4.5   |  24  |  1.05    Ca    9.2      2024 18:02    TPro  6.9  /  Alb  3.7  /  TBili  0.3  /  DBili  x   /  AST  21  /  ALT  18  /  AlkPhos  106  02-18        Urinalysis Basic - ( 2024 19:32 )    Color: Yellow / Appearance: Clear / S.014 / pH: x  Gluc: x / Ketone: Negative mg/dL  / Bili: Negative / Urobili: 0.2 mg/dL   Blood: x / Protein: 30 mg/dL / Nitrite: Negative   Leuk Esterase: Negative / RBC: 1 /HPF / WBC 0 /HPF   Sq Epi: x / Non Sq Epi: 0 /HPF / Bacteria: Negative /HPF        RADIOLOGY, EKG & ADDITIONAL TESTS:     ECG (personally reviewed) = SR w/ 1st degree AVB at 83 bpm.  QTc 420, NSR with T wave inversions in V1 to -V5 with RBBB    ==========================================================    LLE ARTERIAL DOPPLER (OUTPATIENT REPORT) 2/15/24 =  no venous occlusion noted, no significant arterial stenosis.    ==========================================================

## 2024-02-19 NOTE — H&P ADULT - PROBLEM SELECTOR PLAN 6
DVT: Lovenox   Diet: Regular   Pharmacy:  Dispo: medically active, f/u psych recs if requiring [facility] admission DVT: Lovenox   Diet: DASH with Regular Consistency  Pharmacy:  Dispo: medically active, f/u psych recs if requiring [facility] admission Hx of HTN, but does not appear to be on anti-hypertensives on med rec (personally reviewed).   - BP initially in 170s/90s on admission  - possibility that BP is elevated because of agitation/stress    > continue to follow measurements.  If persistently elevated, would start antihypertensive medication - ?amlodipine 5mg qD.

## 2024-02-19 NOTE — H&P ADULT - HISTORY OF PRESENT ILLNESS
84 yo male with pmhx of Dementia (?Alzheimer's), cataracts s/p cataract surgery (on 24), hypertension, and xerotic dermatitis, p/w agitation for the past 2 days with 1 week of insomnia. Patient was evaluated at bedside but history could not be obtained from him due to poor memory and altered mentation but patient denied any acute pain and was A&O x1, oriented to self. History was obtained from LT Aide at bedside, reports patient had change in behavior over the past week, initially starting with insomnia but escalated to agitation and aggression at LTC that began on  when the patient because more physically aggressive with staff, attempting to hit other staff. Per ED note, patient's son Regino Jalloh seems to endorse history provided by aide. Additionally, patient has been speaking more loudly and is generally disorganized. At baseline, patient is A&Ox1-2, oriented to self and , and refers to his LTC as "that place that people live". Has been at [facility] for the past 4 months. Per aide, patient is not typically aggressive but can laugh inappropriately and has poor memory. Per aide, patient does not appear to have had any acute complaints while at nursing home. Eating and drinking normally, has normal BM.     ED Course: given Seroquel 25mg (home dose), labs obtained, CT Head obtained.  84 yo male with pmhx of Dementia (?Alzheimer's), cataracts s/p cataract surgery (on 24), hypertension, and xerotic dermatitis, p/w agitation for the past 2 days with 1 week of insomnia. Patient was evaluated at bedside but history could not be obtained from him due to poor memory and altered mentation but patient denied any acute pain and was A&O x1, oriented to self. History was obtained from LT Aide at bedside, reports patient had change in behavior over the past week, initially starting with insomnia but escalated to agitation and aggression at LTC that began on  when the patient because more physically aggressive with staff, attempting to hit other staff. Per ED note, patient's son Regino Jalloh seems to endorse history provided by aide. Additionally, patient has been speaking more loudly and is generally disorganized. At baseline, patient is A&Ox1-2, oriented to self and , and refers to his LTC as "that place that people live". Has been at [facility] for the past 4 months. Per aide, patient is not typically aggressive but can laugh inappropriately and has poor memory. Per aide, patient does not appear to have had any acute complaints while at nursing home. Eating and drinking normally, has normal BM.     ED Course: given Seroquel 25mg (home dose), labs obtained, CT Head obtained.     Initial vital signs in ED as follows: BP = 196/94, HR = 86, RR = 20, T = 36.7 C (98 F), O2 Sat = 98% on RA.

## 2024-02-19 NOTE — CONSULT NOTE ADULT - ASSESSMENT
83y male with a past medical history/ocular history of Dementia (?Alzheimer's), cataracts s/p cataract surgery (on 1/20/24), hypertension, and xerotic dermatitis, consulted for left eye redness, found to have subconj heme.    #subconj heme, left eye  - Per pt's son, pt had subconj heme of the left eye after cataract surgery and it has been improving  - Pt denying any vision complaints  - VA 20/20, no RAPD, IOP wnl EOM full  - On exam pt has nasal subconj heme in the left eye  - Recommend artificial tears QID and lacri-libe qhs to the left eye    Outpatient Follow-up: Patient should follow-up with his/her ophthalmologist or with [facility] Department of Ophthalmology within 1 week of after discharge at:    [address]  [place] [address]  [phone]    Casey Patten MD, PGY-2  Also available on [employer] Teams     83y male with a past medical history/ocular history of Dementia (?Alzheimer's), cataracts s/p cataract surgery (on 1/20/24), hypertension, and xerotic dermatitis, consulted for left eye redness, found to have subconj heme.    #subconj heme, left eye  - Per pt's son, pt had subconj heme of the left eye after cataract surgery and it has been improving  - Pt denying any vision complaints  - VA 20/20, no RAPD, IOP wnl EOM full  - On exam pt has nasal subconj heme in the left eye  - Recommend artificial tears QID and lacri-libe qhs to the left eye    #s/p cataract surgery  - continue with gtt as prescribed by his ophthalmologist ([facility])  - Pt has follow up later this month    Outpatient Follow-up: Patient should follow-up with his/her ophthalmologist or with  Department of Ophthalmology within 1 week of after discharge at:    [address]  [place] [address]  [phone]    Casey Patten MD, PGY-2  Also available on [employer] Teams

## 2024-02-19 NOTE — H&P ADULT - PROBLEM SELECTOR PLAN 1
P/w insomnia and aggression which is a significant change from baseline. Symptoms are most likely due to worsening dementia, less likely delirium 2/2 primary medical issues.   - UA unremakrable, CT Head negative, patient afebrile without leukocytosis. Low suspicion for acute infection to be triggering symptoms despite age.   - Low suspicion for toxic causes of AMS, patient not on anticholinergics or benzos based on med rec.   - Having normal urination and BM per Aide and patient  - QTc on admission - 420    > monitor off antibiotics  > check B12, Folate, TSH for metabolic work-up  > consider bladder scan if having poor UOP.  > c/w home Memantine 5mg qD and Seroquel 25mg qD. Consider Zyprexa 2.5mg q6h PRN for agitation  > Psych consult in AM for further recommendations with mediation titration P/w insomnia and aggression which is a significant change from baseline. Symptoms are most likely due to worsening dementia, less likely delirium 2/2 primary medical issues. Not acutely agitated currently.   - UA unremarkable, CT Head negative, patient afebrile without leukocytosis. Low suspicion for acute infection to be triggering symptoms.   - Low suspicion for toxic causes of AMS, patient not on anticholinergics or benzos based on med rec.   - Having normal urination and BM per Aide and patient  - QTc on admission - 420    > monitor off antibiotics  > check B12, Folate, TSH for metabolic work-up  > consider bladder scan with PVR if having poor UOP.  > c/w home Memantine 5mg qD and Seroquel 25mg qD. Consider Zyprexa 2.5mg q6h PRN for agitation  > Psych consult in AM for further recommendations with mediation titration P/w insomnia and aggression which is a significant change from baseline. Symptoms are most likely due to worsening dementia, less likely delirium 2/2 primary medical issues. Not acutely agitated currently.   - UA unremarkable, CT Head negative, patient afebrile without leukocytosis. Low suspicion for acute infection to be triggering symptoms.   - Low suspicion for toxic causes of AMS, patient not on anticholinergics or benzos based on med rec.   - Having normal urination and BM per Aide and patient  - QTc on admission - 420    > monitor off antibiotics  > giving 1L LR for suspected dehydration   > check B12, Folate, TSH for metabolic work-up  > consider bladder scan with PVR if having poor UOP. Stool count  > c/w home Memantine 5mg qD and Seroquel 25mg qD. Consider Zyprexa 2.5mg q6h PRN for agitation  > Optho c/s given recent cataract surgery with eye erythema to r/o complication causing AMS.   > Psych consult in AM for further recommendations with mediation titration P/w insomnia and aggression which is a significant change from baseline. Symptoms are most likely due to worsening dementia, less likely delirium 2/2 primary medical issues, insomnia, dehydration. Not acutely agitated currently.   - UA unremarkable, CT Head negative, patient afebrile without leukocytosis. Low suspicion for acute infection to be triggering symptoms.   - Low suspicion for toxic causes of AMS; patient not on anticholinergics or benzos based on med rec.   - Having normal urination and BM, per Aide and patient  - QTc on admission - 420    > monitor off antibiotics  > urine culture in progress; please f/u  > giving 1L LR for dehydration; evaluate need for additional IVF  > encourage oral hydration, as tolerated  > check B12, Folate, TSH, vitamin D for metabolic work-up  > if having poor UOP, would get bladder scan with PVR.  Stool count for evaluation of regular BM's  > c/w home Memantine 5mg qD and Seroquel 25mg qD. Could give Zyprexa 2.5mg q6h PRN for agitation  > Ophthalmology c/s - given recent cataract surgery and current L-eye erythema - to r/o complication causing AMS.   > Psych consult in AM for further recommendations with mediation titration

## 2024-02-19 NOTE — H&P ADULT - ASSESSMENT
84 yo male with pmhx of Dementia (?Alzheimer's), cataracts s/p cataract surgery (on 1/20/24), hypertension, and xerotic dermatitis, p/w agitation for the past 2 days with 1 week of insomnia, likely due to worsening dementia vs acute psychosis iso primary psychiatric disorder. Admitted to medicine for further work-up of agitation.  [ x ]  Lab studies personally reviewed  [ x ]  Radiology personally reviewed  [  ]  Old records personally reviewed    82 yo male with pmhx of Dementia (?Alzheimer's), cataracts s/p cataract surgery (on 1/20/24), hypertension, and xerotic dermatitis, p/w agitation for the past 2 days with 1 week of insomnia, likely due to worsening dementia vs acute psychosis iso primary psychiatric disorder. Admitted to medicine for further work-up of agitation.

## 2024-02-19 NOTE — ED ADULT NURSE REASSESSMENT NOTE - NS ED NURSE REASSESS COMMENT FT1
BREAK RN: Pt noted to be hypertensive. MD Molina aware. Pt offers no complaints. Safety maintained.
Break RN: Pt A&Ox1, respirations equal and unlabored. Pt home aid remains at bedside. Pt medicated as per EMR orders. No acute distress noted. Pending further plan. Safety maintained, bed in lowest position, side rails raised.
Straight cath performed; 350 mls clear yellow colored urine drained.
Taken for x ray
pt received A&O 1 - 2 (Baseline; Hx dementia) offering no complaints at this time. NSR noted on continuous monitor. respirations even and unlabored. no acute distress noted. pt calm and cooperative at this time. awaiting dispo. safety maintained, side rails up. aid at bedside

## 2024-02-19 NOTE — H&P ADULT - PROBLEM SELECTOR PLAN 2
Reportedly had witnessed fall at nursing home. Unclear cause.  - CT Head on admission negative for bleed.     > obtain orthostatic VS  > obtaining pro-BNP, consider TTE if elevated.   > PT consult  > Fall precautions Reportedly had witnessed fall at nursing home. Unclear cause.  - CT Head on admission negative for bleed.     > obtain orthostatic VS  > obtaining pro-BNP, consider TTE if elevated.   > PT consult  > Fall precautions  > Vitamin D and TSH ordered Reportedly had witnessed fall at nursing home. Unclear cause.  Has gait difficulty (uses walker at baseline)  - CT Head on admission negative for bleed.     > obtain orthostatic VS  > obtaining pro-BNP; if elevated, would get TTE if elevated.   > ambulate with walker, with assistance, as tolerated  > PT consult  > Fall precautions  > Vitamin D and TSH ordered

## 2024-02-19 NOTE — PHYSICAL THERAPY INITIAL EVALUATION ADULT - ADDITIONAL COMMENTS
Pt is a poor historian, history obtained from son over the phone. Pt is a resident of a nursing home. Pt uses a rolling walker and requires assistance with ADLs. Pts son reports no other falls in the past 6 months.   Pt left semi supine in bed in NAD, all lines intact, call bell in reach and covering RN made aware.

## 2024-02-19 NOTE — PROGRESS NOTE ADULT - PROBLEM SELECTOR PLAN 2
- Reportedly had witnessed fall at nursing home. Unclear cause.  Has gait difficulty (uses walker at baseline)  - CT Head on admission negative for bleed  - F/u orthostatic vital signs  - PT consult  - Fall precautions - Reportedly had witnessed fall at nursing home. Unclear cause.  Has gait difficulty (uses walker at baseline)  - CT Head on admission negative for bleed  - F/u orthostatic vital signs  - PT recs return to sending facility with PT  - Fall precautions

## 2024-02-20 LAB
ALBUMIN SERPL ELPH-MCNC: 3.3 G/DL — SIGNIFICANT CHANGE UP (ref 3.3–5)
ALP SERPL-CCNC: 82 U/L — SIGNIFICANT CHANGE UP (ref 40–120)
ALT FLD-CCNC: 14 U/L — SIGNIFICANT CHANGE UP (ref 4–41)
ANION GAP SERPL CALC-SCNC: 10 MMOL/L — SIGNIFICANT CHANGE UP (ref 7–14)
AST SERPL-CCNC: 25 U/L — SIGNIFICANT CHANGE UP (ref 4–40)
BASOPHILS # BLD AUTO: 0.03 K/UL — SIGNIFICANT CHANGE UP (ref 0–0.2)
BASOPHILS NFR BLD AUTO: 0.6 % — SIGNIFICANT CHANGE UP (ref 0–2)
BILIRUB SERPL-MCNC: 0.7 MG/DL — SIGNIFICANT CHANGE UP (ref 0.2–1.2)
BUN SERPL-MCNC: 15 MG/DL — SIGNIFICANT CHANGE UP (ref 7–23)
CALCIUM SERPL-MCNC: 8.7 MG/DL — SIGNIFICANT CHANGE UP (ref 8.4–10.5)
CHLORIDE SERPL-SCNC: 107 MMOL/L — SIGNIFICANT CHANGE UP (ref 98–107)
CO2 SERPL-SCNC: 25 MMOL/L — SIGNIFICANT CHANGE UP (ref 22–31)
CREAT SERPL-MCNC: 0.9 MG/DL — SIGNIFICANT CHANGE UP (ref 0.5–1.3)
EGFR: 86 ML/MIN/1.73M2 — SIGNIFICANT CHANGE UP
EOSINOPHIL # BLD AUTO: 0.33 K/UL — SIGNIFICANT CHANGE UP (ref 0–0.5)
EOSINOPHIL NFR BLD AUTO: 6.5 % — HIGH (ref 0–6)
FOLATE SERPL-MCNC: > 20 NG/ML — SIGNIFICANT CHANGE UP (ref 3.1–17.5)
GLUCOSE SERPL-MCNC: 82 MG/DL — SIGNIFICANT CHANGE UP (ref 70–99)
HCT VFR BLD CALC: 40.1 % — SIGNIFICANT CHANGE UP (ref 39–50)
HGB BLD-MCNC: 12.7 G/DL — LOW (ref 13–17)
IANC: 2.81 K/UL — SIGNIFICANT CHANGE UP (ref 1.8–7.4)
IMM GRANULOCYTES NFR BLD AUTO: 0.2 % — SIGNIFICANT CHANGE UP (ref 0–0.9)
LYMPHOCYTES # BLD AUTO: 1.44 K/UL — SIGNIFICANT CHANGE UP (ref 1–3.3)
LYMPHOCYTES # BLD AUTO: 28.3 % — SIGNIFICANT CHANGE UP (ref 13–44)
MCHC RBC-ENTMCNC: 28.8 PG — SIGNIFICANT CHANGE UP (ref 27–34)
MCHC RBC-ENTMCNC: 31.7 GM/DL — LOW (ref 32–36)
MCV RBC AUTO: 90.9 FL — SIGNIFICANT CHANGE UP (ref 80–100)
MONOCYTES # BLD AUTO: 0.47 K/UL — SIGNIFICANT CHANGE UP (ref 0–0.9)
MONOCYTES NFR BLD AUTO: 9.2 % — SIGNIFICANT CHANGE UP (ref 2–14)
NEUTROPHILS # BLD AUTO: 2.81 K/UL — SIGNIFICANT CHANGE UP (ref 1.8–7.4)
NEUTROPHILS NFR BLD AUTO: 55.2 % — SIGNIFICANT CHANGE UP (ref 43–77)
NRBC # BLD: 0 /100 WBCS — SIGNIFICANT CHANGE UP (ref 0–0)
NRBC # FLD: 0 K/UL — SIGNIFICANT CHANGE UP (ref 0–0)
PLATELET # BLD AUTO: 177 K/UL — SIGNIFICANT CHANGE UP (ref 150–400)
POTASSIUM SERPL-MCNC: 4.1 MMOL/L — SIGNIFICANT CHANGE UP (ref 3.5–5.3)
POTASSIUM SERPL-SCNC: 4.1 MMOL/L — SIGNIFICANT CHANGE UP (ref 3.5–5.3)
PROT SERPL-MCNC: 6 G/DL — SIGNIFICANT CHANGE UP (ref 6–8.3)
RBC # BLD: 4.41 M/UL — SIGNIFICANT CHANGE UP (ref 4.2–5.8)
RBC # FLD: 13.9 % — SIGNIFICANT CHANGE UP (ref 10.3–14.5)
SODIUM SERPL-SCNC: 142 MMOL/L — SIGNIFICANT CHANGE UP (ref 135–145)
T PALLIDUM AB TITR SER: NEGATIVE — SIGNIFICANT CHANGE UP
VIT B12 SERPL-MCNC: 643 PG/ML — SIGNIFICANT CHANGE UP (ref 200–900)
WBC # BLD: 5.09 K/UL — SIGNIFICANT CHANGE UP (ref 3.8–10.5)
WBC # FLD AUTO: 5.09 K/UL — SIGNIFICANT CHANGE UP (ref 3.8–10.5)

## 2024-02-20 PROCEDURE — 99233 SBSQ HOSP IP/OBS HIGH 50: CPT

## 2024-02-20 PROCEDURE — 99223 1ST HOSP IP/OBS HIGH 75: CPT

## 2024-02-20 RX ORDER — CITALOPRAM 10 MG/1
20 TABLET, FILM COATED ORAL DAILY
Refills: 0 | Status: DISCONTINUED | OUTPATIENT
Start: 2024-02-20 | End: 2024-03-04

## 2024-02-20 RX ORDER — OLANZAPINE 15 MG/1
1.25 TABLET, FILM COATED ORAL ONCE
Refills: 0 | Status: COMPLETED | OUTPATIENT
Start: 2024-02-20 | End: 2024-02-20

## 2024-02-20 RX ORDER — OLANZAPINE 15 MG/1
1.25 TABLET, FILM COATED ORAL ONCE
Refills: 0 | Status: DISCONTINUED | OUTPATIENT
Start: 2024-02-20 | End: 2024-02-20

## 2024-02-20 RX ORDER — QUETIAPINE FUMARATE 200 MG/1
50 TABLET, FILM COATED ORAL AT BEDTIME
Refills: 0 | Status: DISCONTINUED | OUTPATIENT
Start: 2024-02-20 | End: 2024-02-22

## 2024-02-20 RX ADMIN — Medication 2 DROP(S): at 00:04

## 2024-02-20 RX ADMIN — MEMANTINE HYDROCHLORIDE 5 MILLIGRAM(S): 10 TABLET ORAL at 11:49

## 2024-02-20 RX ADMIN — Medication 1 APPLICATION(S): at 22:25

## 2024-02-20 RX ADMIN — Medication 2 DROP(S): at 05:01

## 2024-02-20 RX ADMIN — OLANZAPINE 1.25 MILLIGRAM(S): 15 TABLET, FILM COATED ORAL at 06:02

## 2024-02-20 RX ADMIN — Medication 2 DROP(S): at 18:14

## 2024-02-20 RX ADMIN — AMLODIPINE BESYLATE 5 MILLIGRAM(S): 2.5 TABLET ORAL at 05:00

## 2024-02-20 RX ADMIN — Medication 2 DROP(S): at 11:50

## 2024-02-20 RX ADMIN — Medication 3 MILLIGRAM(S): at 21:18

## 2024-02-20 RX ADMIN — SENNA PLUS 2 TABLET(S): 8.6 TABLET ORAL at 21:17

## 2024-02-20 RX ADMIN — Medication 1 DROP(S): at 12:51

## 2024-02-20 RX ADMIN — QUETIAPINE FUMARATE 50 MILLIGRAM(S): 200 TABLET, FILM COATED ORAL at 21:18

## 2024-02-20 RX ADMIN — Medication 1 MILLIGRAM(S): at 11:49

## 2024-02-20 NOTE — PROGRESS NOTE ADULT - PROBLEM SELECTOR PLAN 8
DVT: Lovenox   Diet: DASH with Regular Consistency  Pharmacy:  Dispo: medically active, f/u Psych recs if requiring [facility] admission    Discussed with son Regino and TYAEA 2/18.

## 2024-02-20 NOTE — DISCHARGE NOTE PROVIDER - NSDCCPTREATMENT_GEN_ALL_CORE_FT
PRINCIPAL PROCEDURE  Procedure: CT head  Findings and Treatment: FINDINGS:  No acute intracranial hemorrhage, mass effect, hydrocephalus, or midline   shift.  No extra-axial collections.  Sulcal and ventricular prominence consistent with age appropriate   involutional change. Periventricular and subcortical white matter   hypodensities consistent with mild microvascular changes.  The visualized paranasal sinuses are clear. Small effusion within the   left mastoid air cells. Bilateral ocular lens replacements.  The calvarium is intact.  IMPRESSION:  No acute intracranial hemorrhage, mass effect, or midline shift.

## 2024-02-20 NOTE — BH CONSULTATION LIAISON ASSESSMENT NOTE - NSBHCHARTREVIEWVS_PSY_A_CORE FT
Vital Signs Last 24 Hrs  T(C): 36.3 (20 Feb 2024 04:49), Max: 36.9 (19 Feb 2024 15:24)  T(F): 97.4 (20 Feb 2024 04:49), Max: 98.5 (19 Feb 2024 15:24)  HR: 65 (20 Feb 2024 04:49) (65 - 87)  BP: 151/84 (20 Feb 2024 04:49) (151/84 - 190/90)  BP(mean): --  RR: 16 (20 Feb 2024 04:49) (16 - 18)  SpO2: 100% (20 Feb 2024 04:49) (96% - 100%)    Parameters below as of 20 Feb 2024 04:49  Patient On (Oxygen Delivery Method): room air

## 2024-02-20 NOTE — BH CONSULTATION LIAISON ASSESSMENT NOTE - HPI (INCLUDE ILLNESS QUALITY, SEVERITY, DURATION, TIMING, CONTEXT, MODIFYING FACTORS, ASSOCIATED SIGNS AND SYMPTOMS)
84 yo male with pmhx of Dementia (?Alzheimer's), cataracts s/p cataract surgery (on 24), hypertension, and xerotic dermatitis, p/w agitation for the past 2 days with 1 week of insomnia. Psych consulted for agitation.     Chart reviewed. Per initial H&P: "Patient was evaluated at bedside but history could not be obtained from him due to poor memory and altered mentation but patient denied any acute pain and was A&O x1, oriented to self. History was obtained from LT Aide at bedside, reports patient had change in behavior over the past week, initially starting with insomnia but escalated to agitation and aggression at LT that began on  when the patient because more physically aggressive with staff, attempting to hit other staff. Per ED note, patient's son Regino Jalloh seems to endorse history provided by aide. Additionally, patient has been speaking more loudly and is generally disorganized. At baseline, patient is A&Ox1-2, oriented to self and , and refers to his LTC as "that place that people live". Has been at [facility] for the past 4 months. Per aide, patient is not typically aggressive but can laugh inappropriately and has poor memory. Per aide, patient does not appear to have had any acute complaints while at nursing home. Eating and drinking normally, has normal BM."    On exam, pt is somnolent, but awaken easily. Aid at bedside. Pleasant, though confused. Oriented to self and to "hospital" but not to context/time/year/month. Has insight that he is forgetful, has Alzheimer's. Denies current pain/discomfort. Also denies depressed mood, SI, HI, AVH. Somewhat drowsy. Speech pattern shows rambling off. Exam limited due to poor insight. Of note, he had received Zyprexa 1.25mg PRN x 1 this AM    Per aid who knows him x 4 months, pt is not at baseline, with rambling speech, aggressive behavior (including this AM), and insomnia as described above. Called son Regino Jalloh, who reports dementia hx since 2022. Recaptured hx as above, noting that insomnia led to both melatonin and Seroquel trials in past two months. Later connected with Dr. Mahan from [facility] for outpt geropsych care. Agrees that insomnia leads to confusion/agitation. Open to med changes as well as collaborating with [facility]. Is unsure if an outpt retrial vs inpt psych is needed at this time.    Also discussed case w/ Dr. Mahan, outpt psychiatrist. She reports seeing the patient twice, targeting insomnia with Melatonin/Seroquel as above. Also cites pt is on celexa 20mg. Says pt has moderate to severe cognitive impairment with possible psychotic sx (hallucinations). Is supportive of meds being adjusted here before returning to her care.

## 2024-02-20 NOTE — DISCHARGE NOTE PROVIDER - NSDCCPCAREPLAN_GEN_ALL_CORE_FT
PRINCIPAL DISCHARGE DIAGNOSIS  Diagnosis: Dementia  Assessment and Plan of Treatment:       SECONDARY DISCHARGE DIAGNOSES  Diagnosis: Dementia  Assessment and Plan of Treatment:      PRINCIPAL DISCHARGE DIAGNOSIS  Diagnosis: Dementia  Assessment and Plan of Treatment: You presented with changed on behavior. Your blood ryanne, urine test, chest x-ray was negative. You were seen by behavioral health and your medications were adjusted.     PRINCIPAL DISCHARGE DIAGNOSIS  Diagnosis: Dementia  Assessment and Plan of Treatment: You presented with changed on behavior. Your blood ryanne, urine test, chest x-ray was negative. You were seen by behavioral health and your medications were adjusted.      SECONDARY DISCHARGE DIAGNOSES  Diagnosis: Cataract  Assessment and Plan of Treatment: You were seen by Ophthalmology. Continue eye drops as prescribed. Follow up as outpatient.

## 2024-02-20 NOTE — CHART NOTE - NSCHARTNOTEFT_GEN_A_CORE
MEDICINE ACP NIGHT COVERAGE    Notified by RN that patient agitated, kicking and punching at staff, 1.25mg of Zyprexa ordered. Shortly after, again contacted by RN that medication ineffective, patient still punching and kicking at staff. Additional 1.25 of Zyprexa ordered. Writer arrived at bedside, security and nursing staff at bedside with patient in being placed in 4-pt restraints however appearing calm. Patient A&Ox1, at his baseline mental status. Restraints to be removed will trial without restraints. RN instructed to notify provider if further agitation.     Aguila Lucas PA-C  Medicine ACP  [id number]

## 2024-02-20 NOTE — PROGRESS NOTE ADULT - PROBLEM SELECTOR PLAN 2
- Reportedly had witnessed fall at nursing home. Unclear cause.  Has gait difficulty (uses walker at baseline)  - CT Head on admission negative for bleed  - F/u orthostatic vital signs- re-ordered  - PT recs return to sending facility with PT  - Fall precautions

## 2024-02-20 NOTE — DISCHARGE NOTE PROVIDER - CARE PROVIDER_API CALL
Dr. Titus Bridges MD,   [address] [place] [address]  Phone: [phone]  Fax: (   )    -  Follow Up Time:

## 2024-02-20 NOTE — DISCHARGE NOTE PROVIDER - PROVIDER TOKENS
FREE:[LAST:[Dr. Titus Bridges MD],PHONE:[phone]],FAX:[(   )    -],ADDRESS:[[address] [place] [address]]]

## 2024-02-20 NOTE — BH CONSULTATION LIAISON ASSESSMENT NOTE - SUMMARY
84 yo male with pmhx of Dementia (?Alzheimer's), cataracts s/p cataract surgery (on 1/20/24), hypertension, and xerotic dermatitis, p/w agitation for the past 2 days with 1 week of insomnia. Psych consulted for agitation. Pt presents w/ dementia hx now worsened by recent insomnia, unclear precipitants. Warrants medical as well as psychiatric optimization.     Plan:   - Observation per 7S team; can utilize [facility] aid if/when available  - Delirium w/u and medical stabilization as you are (f/u labs, bladder scan)  - INCREASE Seroquel to 50mg PO Q 7PM to prevent sundowning/agitation  - C/W Melatonin, Memantine  - Please start Celexa 20mg daily, first dose today  - Lacks capacity, cannot leave AMA  - Dispo: TBD, will continue to assess.

## 2024-02-20 NOTE — BH CONSULTATION LIAISON ASSESSMENT NOTE - MSE UNSTRUCTURED FT
On exam, pt is somnolent, but awaken easily. Aid at bedside. Pleasant, though confused. Oriented to self and to "hospital" but not to context/time/year/month. Has insight that he is forgetful, has Alzheimer's. Denies current pain/discomfort. Also denies depressed mood, SI, HI, AVH. Somewhat drowsy. Speech pattern shows rambling off. Exam limited due to poor insight. Of note, he had received Zyprexa 1.25mg PRN x 1 this AM

## 2024-02-20 NOTE — DISCHARGE NOTE PROVIDER - NSDCMRMEDTOKEN_GEN_ALL_CORE_FT
folic acid 1 mg oral tablet: 1 tab(s) orally once a day  loratadine 10 mg oral tablet: 1 tab(s) orally once a day as needed for  itching  melatonin 5 mg oral tablet: 1 tab(s) orally once a day (at bedtime)  memantine 5 mg oral tablet: 1 tab(s) orally once a day  polyethylene glycol 3350 oral powder for reconstitution: 17 gram(s) orally once a day as needed for  constipation  prednisoLONE acetate 1% ophthalmic suspension: 1 drop(s) in the left eye once a day Started 1/20/2024 - For 30 days Total  senna (sennosides) 8.6 mg oral tablet: 2 tab(s) orally once a day (at bedtime)  Seroquel 25 mg oral tablet: 1 tab(s) orally once a day (at bedtime)  simvastatin 5 mg oral tablet: 1 tab(s) orally   amLODIPine 5 mg oral tablet: 1 tab(s) orally once a day  divalproex sodium 250 mg oral delayed release tablet: 1 tab(s) orally 3 times a day  folic acid 1 mg oral tablet: 1 tab(s) orally once a day  loratadine 10 mg oral tablet: 1 tab(s) orally once a day as needed for  itching  melatonin 5 mg oral tablet: 1 tab(s) orally once a day (at bedtime)  memantine 5 mg oral tablet: 1 tab(s) orally once a day  ocular lubricant ophthalmic ointment: 1 application to each affected eye once a day (at bedtime) Left Eye  ocular lubricant ophthalmic solution: 2 drop(s) to each affected eye 4 times a day  petrolatum topical ointment: Apply topically to affected area 4 times a day as needed for chapped lips 1 Apply topically to affected area 4 times a day As needed Chapped Lips  polyethylene glycol 3350 oral powder for reconstitution: 17 gram(s) orally once a day as needed for  constipation  QUEtiapine 100 mg oral tablet: 1 tab(s) orally once a day (at bedtime)  senna (sennosides) 8.6 mg oral tablet: 2 tab(s) orally once a day (at bedtime)  simvastatin 5 mg oral tablet: 1 tab(s) orally

## 2024-02-20 NOTE — BH CONSULTATION LIAISON ASSESSMENT NOTE - NSBHCONSULTFOLLOW_PSY_ALL_CORE
Yes... Surgeon/Pathologist Verbiage (Will Incorporate Name Of Surgeon From Intro If Not Blank): operated in two distinct and integrated capacities as the surgeon and pathologist.

## 2024-02-20 NOTE — BH CONSULTATION LIAISON ASSESSMENT NOTE - CURRENT MEDICATION
MEDICATIONS  (STANDING):  amLODIPine   Tablet 5 milliGRAM(s) Oral daily  artificial tears (preservative free) Ophthalmic Solution 2 Drop(s) Left EYE four times a day  enoxaparin Injectable 40 milliGRAM(s) SubCutaneous every 24 hours  folic acid 1 milliGRAM(s) Oral daily  melatonin 3 milliGRAM(s) Oral at bedtime  memantine 5 milliGRAM(s) Oral daily  petrolatum Ophthalmic Ointment 1 Application(s) Left EYE at bedtime  prednisoLONE acetate 1% Suspension 1 Drop(s) Left EYE daily  QUEtiapine 25 milliGRAM(s) Oral at bedtime  senna 2 Tablet(s) Oral at bedtime  sodium chloride 0.9%. 1000 milliLiter(s) (75 mL/Hr) IV Continuous <Continuous>    MEDICATIONS  (PRN):  AQUAPHOR (petrolatum Ointment) 1 Application(s) Topical four times a day PRN Chapped Lips

## 2024-02-20 NOTE — DISCHARGE NOTE PROVIDER - HOSPITAL COURSE
83M PMHx dementia (?Alzheimer's), cataracts s/p cataract surgery (on 1/20/24), hypertension, and xerotic dermatitis, p/w agitation for the past 2 days with 1 week of insomnia, likely due to worsening dementia vs acute psychosis iso primary psychiatric disorder. Admitted to medicine for further work-up of agitation.       # Acute encephalopathy.    - P/w insomnia and aggression which is a significant change from baseline. Symptoms are most likely due to worsening dementia, less likely delirium 2/2 primary medical issues, insomnia, dehydration. Not acutely agitated currently.   - UA unremarkable, Ucx negative, CT Head negative, patient afebrile without leukocytosis. Low suspicion for acute infection to be triggering symptoms  - Low suspicion for toxic causes of AMS; patient not on anticholinergics or benzos based on med rec  - Having normal urination and BM, per Aide and patient  - QTc on admission - 420  - TSH 2.80, B12, Folate, vitamin D: wnl, RPR negative  - c/w home Memantine 5mg qD and Seroquel 25mg qD. Zyprexa 2.5mg q6h PRN for agitation  - Ophtho recs appreciated - Lacrilube and artificial tears in left eye  - Psych evaluated: increase seroquel to 50mg, started celexa.   83M PMHx dementia (?Alzheimer's), cataracts s/p cataract surgery (on 1/20/24), hypertension, and xerotic dermatitis, p/w agitation for the past 2 days with 1 week of insomnia, likely due to worsening dementia vs acute psychosis iso primary psychiatric disorder. UA unremarkable, Ucx negative, CT Head negative, patient afebrile without leukocytosis. Low suspicion for acute infection to be triggering symptoms. Having normal urination and BM. CBC, BMP without significant abnormalities, TSH 2.80, B12, Folate, vitamin D: wnl, RPR negative. Patient was continued on memantine. Seen by psychiatry and recommended to increase seroquel and to start celexa. Patient improved. Seen by PT, rec to return to prior facility. Seen by opthalmology for recent cataract surgery, recommended to continue lacrilube and artificial tears.      84 y/o Male, with a PmHx dementia (?Alzheimer's), cataracts s/p cataract surgery (on 1/20/24), hypertension, and xerotic dermatitis, p/w agitation for the past 2 days with 1 week of insomnia, likely due to worsening dementia vs acute psychosis iso primary psychiatric disorder. UA unremarkable, Ucx negative, CT Head negative, patient afebrile without leukocytosis. Low suspicion for acute infection to be triggering symptoms. Having normal urination and BM. CBC, BMP without significant abnormalities, TSH 2.80, B12, Folate, vitamin D: wnl, RPR negative. Patient was continued on memantine. Seen by psychiatry and recommended to increase Seroquel. Patient improved. Seen by PT, rec to return to prior facility. Seen by opthalmology for recent cataract surgery, recommended to continue Lacri-Lube and artificial tears.   Pt now medically cleared for discharge back to the [facility].

## 2024-02-21 LAB
ALBUMIN SERPL ELPH-MCNC: 3.3 G/DL — SIGNIFICANT CHANGE UP (ref 3.3–5)
ALP SERPL-CCNC: 86 U/L — SIGNIFICANT CHANGE UP (ref 40–120)
ALT FLD-CCNC: 14 U/L — SIGNIFICANT CHANGE UP (ref 4–41)
ANION GAP SERPL CALC-SCNC: 9 MMOL/L — SIGNIFICANT CHANGE UP (ref 7–14)
AST SERPL-CCNC: 23 U/L — SIGNIFICANT CHANGE UP (ref 4–40)
BASOPHILS # BLD AUTO: 0.05 K/UL — SIGNIFICANT CHANGE UP (ref 0–0.2)
BASOPHILS NFR BLD AUTO: 0.9 % — SIGNIFICANT CHANGE UP (ref 0–2)
BILIRUB SERPL-MCNC: 0.4 MG/DL — SIGNIFICANT CHANGE UP (ref 0.2–1.2)
BUN SERPL-MCNC: 16 MG/DL — SIGNIFICANT CHANGE UP (ref 7–23)
CALCIUM SERPL-MCNC: 9 MG/DL — SIGNIFICANT CHANGE UP (ref 8.4–10.5)
CHLORIDE SERPL-SCNC: 109 MMOL/L — HIGH (ref 98–107)
CO2 SERPL-SCNC: 25 MMOL/L — SIGNIFICANT CHANGE UP (ref 22–31)
CREAT SERPL-MCNC: 1.02 MG/DL — SIGNIFICANT CHANGE UP (ref 0.5–1.3)
EGFR: 74 ML/MIN/1.73M2 — SIGNIFICANT CHANGE UP
EOSINOPHIL # BLD AUTO: 0.43 K/UL — SIGNIFICANT CHANGE UP (ref 0–0.5)
EOSINOPHIL NFR BLD AUTO: 7.3 % — HIGH (ref 0–6)
GLUCOSE SERPL-MCNC: 84 MG/DL — SIGNIFICANT CHANGE UP (ref 70–99)
HCT VFR BLD CALC: 41.5 % — SIGNIFICANT CHANGE UP (ref 39–50)
HGB BLD-MCNC: 13.4 G/DL — SIGNIFICANT CHANGE UP (ref 13–17)
IANC: 3.46 K/UL — SIGNIFICANT CHANGE UP (ref 1.8–7.4)
IMM GRANULOCYTES NFR BLD AUTO: 0.2 % — SIGNIFICANT CHANGE UP (ref 0–0.9)
LEVETIRACETAM SERPL-MCNC: <2 UG/ML — LOW (ref 10–40)
LYMPHOCYTES # BLD AUTO: 1.47 K/UL — SIGNIFICANT CHANGE UP (ref 1–3.3)
LYMPHOCYTES # BLD AUTO: 25 % — SIGNIFICANT CHANGE UP (ref 13–44)
MCHC RBC-ENTMCNC: 29.2 PG — SIGNIFICANT CHANGE UP (ref 27–34)
MCHC RBC-ENTMCNC: 32.3 GM/DL — SIGNIFICANT CHANGE UP (ref 32–36)
MCV RBC AUTO: 90.4 FL — SIGNIFICANT CHANGE UP (ref 80–100)
MONOCYTES # BLD AUTO: 0.45 K/UL — SIGNIFICANT CHANGE UP (ref 0–0.9)
MONOCYTES NFR BLD AUTO: 7.7 % — SIGNIFICANT CHANGE UP (ref 2–14)
MRSA PCR RESULT.: SIGNIFICANT CHANGE UP
NEUTROPHILS # BLD AUTO: 3.46 K/UL — SIGNIFICANT CHANGE UP (ref 1.8–7.4)
NEUTROPHILS NFR BLD AUTO: 58.9 % — SIGNIFICANT CHANGE UP (ref 43–77)
NRBC # BLD: 0 /100 WBCS — SIGNIFICANT CHANGE UP (ref 0–0)
NRBC # FLD: 0 K/UL — SIGNIFICANT CHANGE UP (ref 0–0)
PLATELET # BLD AUTO: 182 K/UL — SIGNIFICANT CHANGE UP (ref 150–400)
POTASSIUM SERPL-MCNC: 4.2 MMOL/L — SIGNIFICANT CHANGE UP (ref 3.5–5.3)
POTASSIUM SERPL-SCNC: 4.2 MMOL/L — SIGNIFICANT CHANGE UP (ref 3.5–5.3)
PROT SERPL-MCNC: 6 G/DL — SIGNIFICANT CHANGE UP (ref 6–8.3)
RBC # BLD: 4.59 M/UL — SIGNIFICANT CHANGE UP (ref 4.2–5.8)
RBC # FLD: 13.7 % — SIGNIFICANT CHANGE UP (ref 10.3–14.5)
S AUREUS DNA NOSE QL NAA+PROBE: SIGNIFICANT CHANGE UP
SODIUM SERPL-SCNC: 143 MMOL/L — SIGNIFICANT CHANGE UP (ref 135–145)
WBC # BLD: 5.87 K/UL — SIGNIFICANT CHANGE UP (ref 3.8–10.5)
WBC # FLD AUTO: 5.87 K/UL — SIGNIFICANT CHANGE UP (ref 3.8–10.5)

## 2024-02-21 PROCEDURE — 99233 SBSQ HOSP IP/OBS HIGH 50: CPT

## 2024-02-21 PROCEDURE — 99232 SBSQ HOSP IP/OBS MODERATE 35: CPT

## 2024-02-21 RX ORDER — SODIUM CHLORIDE 9 MG/ML
500 INJECTION INTRAMUSCULAR; INTRAVENOUS; SUBCUTANEOUS
Refills: 0 | Status: DISCONTINUED | OUTPATIENT
Start: 2024-02-21 | End: 2024-02-22

## 2024-02-21 RX ORDER — CHLORHEXIDINE GLUCONATE 213 G/1000ML
1 SOLUTION TOPICAL DAILY
Refills: 0 | Status: DISCONTINUED | OUTPATIENT
Start: 2024-02-21 | End: 2024-03-07

## 2024-02-21 RX ADMIN — SENNA PLUS 2 TABLET(S): 8.6 TABLET ORAL at 21:07

## 2024-02-21 RX ADMIN — Medication 1 APPLICATION(S): at 21:08

## 2024-02-21 RX ADMIN — AMLODIPINE BESYLATE 5 MILLIGRAM(S): 2.5 TABLET ORAL at 05:22

## 2024-02-21 RX ADMIN — Medication 2 DROP(S): at 05:22

## 2024-02-21 RX ADMIN — Medication 1 MILLIGRAM(S): at 11:43

## 2024-02-21 RX ADMIN — Medication 2 DROP(S): at 17:27

## 2024-02-21 RX ADMIN — CITALOPRAM 20 MILLIGRAM(S): 10 TABLET, FILM COATED ORAL at 11:42

## 2024-02-21 RX ADMIN — CHLORHEXIDINE GLUCONATE 1 APPLICATION(S): 213 SOLUTION TOPICAL at 11:56

## 2024-02-21 RX ADMIN — Medication 1 DROP(S): at 21:11

## 2024-02-21 RX ADMIN — Medication 3 MILLIGRAM(S): at 21:08

## 2024-02-21 RX ADMIN — Medication 2 DROP(S): at 11:42

## 2024-02-21 RX ADMIN — QUETIAPINE FUMARATE 50 MILLIGRAM(S): 200 TABLET, FILM COATED ORAL at 21:07

## 2024-02-21 RX ADMIN — Medication 2 DROP(S): at 00:27

## 2024-02-21 RX ADMIN — SODIUM CHLORIDE 500 MILLILITER(S): 9 INJECTION INTRAMUSCULAR; INTRAVENOUS; SUBCUTANEOUS at 13:28

## 2024-02-21 RX ADMIN — MEMANTINE HYDROCHLORIDE 5 MILLIGRAM(S): 10 TABLET ORAL at 11:42

## 2024-02-21 NOTE — DIETITIAN INITIAL EVALUATION ADULT - PROBLEM SELECTOR PLAN 5
Very dry oral mucosa.  Lab-work appears hemoconcentrated  - likelihood that patient has not been drinking fluids, in the context of mental status changes    > IVF prescribed; one liter LR.  Will likely need additional IVF  > encourage oral hydration, as tolerated  > f/u electrolytes, renal function and for signs of clinical improvement/resolution  > f/u intake/output Q4H

## 2024-02-21 NOTE — DIETITIAN INITIAL EVALUATION ADULT - PROBLEM SELECTOR PLAN 1
P/w insomnia and aggression which is a significant change from baseline. Symptoms are most likely due to worsening dementia, less likely delirium 2/2 primary medical issues, insomnia, dehydration. Not acutely agitated currently.   - UA unremarkable, CT Head negative, patient afebrile without leukocytosis. Low suspicion for acute infection to be triggering symptoms.   - Low suspicion for toxic causes of AMS; patient not on anticholinergics or benzos based on med rec.   - Having normal urination and BM, per Aide and patient  - QTc on admission - 420    > monitor off antibiotics  > urine culture in progress; please f/u  > giving 1L LR for dehydration; evaluate need for additional IVF  > encourage oral hydration, as tolerated  > check B12, Folate, TSH, vitamin D for metabolic work-up  > if having poor UOP, would get bladder scan with PVR.  Stool count for evaluation of regular BM's  > c/w home Memantine 5mg qD and Seroquel 25mg qD. Could give Zyprexa 2.5mg q6h PRN for agitation  > Ophthalmology c/s - given recent cataract surgery and current L-eye erythema - to r/o complication causing AMS.   > Psych consult in AM for further recommendations with mediation titration

## 2024-02-21 NOTE — BH CONSULTATION LIAISON PROGRESS NOTE - NSBHASSESSMENTFT_PSY_ALL_CORE
82 yo male with pmhx of Dementia (?Alzheimer's), cataracts s/p cataract surgery (on 1/20/24), hypertension, and xerotic dermatitis, p/w agitation for the past 2 days with 1 week of insomnia. Psych consulted for agitation. Pt presents w/ dementia hx now worsened by recent insomnia, unclear precipitants. Warrants medical as well as psychiatric optimization.     2/21- In behavorial control. No PRNs required for agitation overnight.  Compliant with standing medication.    Plan:   - Observation per 7S team; can utilize residential aid if/when available  - Delirium w/u and medical stabilization as you are (f/u labs, bladder scan)  - c/w Seroquel to 50mg PO Q 7PM to prevent sundowning/agitation  - C/W Melatonin, Memantine  - Please start Celexa 20mg daily, first dose today  - Lacks capacity, cannot leave AMA  - Dispo: TBD, will continue to assess.  Dementia  r/o delirium   84 yo male with pmhx of Dementia (?Alzheimer's), cataracts s/p cataract surgery (on 1/20/24), hypertension, and xerotic dermatitis, p/w agitation for the past 2 days with 1 week of insomnia. Psych consulted for agitation. Pt presents w/ dementia hx now worsened by recent insomnia, unclear precipitants. Warrants medical as well as psychiatric optimization.     2/21- In behavorial control. No PRNs required for agitation overnight.  Compliant with standing medication.    Plan:   - Observation per 7S team; can utilize jail aid if/when available  - Delirium w/u and medical stabilization as you are (f/u labs, bladder scan)  - c/w Seroquel to 50mg PO Q 7PM to prevent sundowning/agitation  - C/W Melatonin, Memantine  - Please start Celexa 20mg daily, first dose today  - Lacks capacity, cannot leave AMA  - Dispo: TBD, will continue to assess, though likely a return to community/jail with ongoing outpt care.

## 2024-02-21 NOTE — BH CONSULTATION LIAISON PROGRESS NOTE - NSBHFUPINTERVALHXFT_PSY_A_CORE
Chart reviewed. No acute overnight events or no PRNs required overnight. Compliant with standing medication.     Patient seen and examined with aide at bedside. On exam, patient is calm, cooperative, AAOx1 (disoriented to place and time). Unsure of      Chart reviewed. No acute overnight events or no PRNs required overnight. Compliant with standing medication.     Patient seen and examined with aide at bedside. On exam, patient is calm, cooperative, and disoriented (AAOx1-2). Patient states he is unsure of his quality of sleep. Endorses decreased vision in his eye. States he is hungry, requesting food from staff.

## 2024-02-21 NOTE — DIETITIAN INITIAL EVALUATION ADULT - PROBLEM SELECTOR PLAN 3
Report of insomnia for the past ~ 2 weeks, although patient refutes this and states he sleeps 24 hours  - likelihood that this is contributing to the agitation/acute encephalopathy  - unclear if insomnia is being triggered by some other issue, for example, eye pain/discomfort, constipation, headache (since patient has dementia and may not be able to recall irritants)    > Ophthalmology consult in the AM (s/p cataract surgeries recently, per reports)  > F/up AM lab-work  > f/u TSH, vitamin D level  > continuing with psychotropic medications  > trial of melatonin

## 2024-02-21 NOTE — DIETITIAN INITIAL EVALUATION ADULT - PROBLEM SELECTOR PLAN 2
Reportedly had witnessed fall at nursing home. Unclear cause.  Has gait difficulty (uses walker at baseline)  - CT Head on admission negative for bleed.     > obtain orthostatic VS  > obtaining pro-BNP; if elevated, would get TTE if elevated.   > ambulate with walker, with assistance, as tolerated  > PT consult  > Fall precautions  > Vitamin D and TSH ordered

## 2024-02-21 NOTE — DIETITIAN INITIAL EVALUATION ADULT - PERTINENT LABORATORY DATA
02-21    143  |  109<H>  |  16  ----------------------------<  84  4.2   |  25  |  1.02    Ca    9.0      21 Feb 2024 05:00  Phos  3.2     02-19  Mg     1.90     02-19    TPro  6.0  /  Alb  3.3  /  TBili  0.4  /  DBili  x   /  AST  23  /  ALT  14  /  AlkPhos  86  02-21  A1C with Estimated Average Glucose Result: 5.3 % (02-19-24 @ 15:08)

## 2024-02-21 NOTE — DIETITIAN INITIAL EVALUATION ADULT - PERTINENT MEDS FT
MEDICATIONS  (STANDING):  amLODIPine   Tablet 5 milliGRAM(s) Oral daily  artificial tears (preservative free) Ophthalmic Solution 2 Drop(s) Left EYE four times a day  chlorhexidine 2% Cloths 1 Application(s) Topical daily  citalopram 20 milliGRAM(s) Oral daily  enoxaparin Injectable 40 milliGRAM(s) SubCutaneous every 24 hours  folic acid 1 milliGRAM(s) Oral daily  melatonin 3 milliGRAM(s) Oral at bedtime  memantine 5 milliGRAM(s) Oral daily  petrolatum Ophthalmic Ointment 1 Application(s) Left EYE at bedtime  prednisoLONE acetate 1% Suspension 1 Drop(s) Left EYE daily  QUEtiapine 50 milliGRAM(s) Oral at bedtime  senna 2 Tablet(s) Oral at bedtime  sodium chloride 0.9%. 500 milliLiter(s) (500 mL/Hr) IV Continuous <Continuous>    MEDICATIONS  (PRN):  AQUAPHOR (petrolatum Ointment) 1 Application(s) Topical four times a day PRN Chapped Lips

## 2024-02-21 NOTE — DIETITIAN INITIAL EVALUATION ADULT - PROBLEM SELECTOR PLAN 4
Hx of Dementia, possible Alzheimer's. Follow with [facility]-Psych Dr. Mahan at [facility].     > c/w home Memantine and Seroquel as above  > QTc currently 420  > f/u Psychiatry/BH consult.

## 2024-02-22 DIAGNOSIS — F02.B2: ICD-10-CM

## 2024-02-22 DIAGNOSIS — F60.0 PARANOID PERSONALITY DISORDER: ICD-10-CM

## 2024-02-22 LAB
ALBUMIN SERPL ELPH-MCNC: 3.6 G/DL — SIGNIFICANT CHANGE UP (ref 3.3–5)
ALP SERPL-CCNC: 98 U/L — SIGNIFICANT CHANGE UP (ref 40–120)
ALT FLD-CCNC: 19 U/L — SIGNIFICANT CHANGE UP (ref 4–41)
ANION GAP SERPL CALC-SCNC: 13 MMOL/L — SIGNIFICANT CHANGE UP (ref 7–14)
AST SERPL-CCNC: 30 U/L — SIGNIFICANT CHANGE UP (ref 4–40)
BASOPHILS # BLD AUTO: 0.05 K/UL — SIGNIFICANT CHANGE UP (ref 0–0.2)
BASOPHILS NFR BLD AUTO: 0.7 % — SIGNIFICANT CHANGE UP (ref 0–2)
BILIRUB SERPL-MCNC: 0.3 MG/DL — SIGNIFICANT CHANGE UP (ref 0.2–1.2)
BUN SERPL-MCNC: 14 MG/DL — SIGNIFICANT CHANGE UP (ref 7–23)
CALCIUM SERPL-MCNC: 9.4 MG/DL — SIGNIFICANT CHANGE UP (ref 8.4–10.5)
CHLORIDE SERPL-SCNC: 108 MMOL/L — HIGH (ref 98–107)
CO2 SERPL-SCNC: 23 MMOL/L — SIGNIFICANT CHANGE UP (ref 22–31)
CREAT SERPL-MCNC: 0.91 MG/DL — SIGNIFICANT CHANGE UP (ref 0.5–1.3)
EGFR: 85 ML/MIN/1.73M2 — SIGNIFICANT CHANGE UP
EOSINOPHIL # BLD AUTO: 0.61 K/UL — HIGH (ref 0–0.5)
EOSINOPHIL NFR BLD AUTO: 8.8 % — HIGH (ref 0–6)
GLUCOSE SERPL-MCNC: 70 MG/DL — SIGNIFICANT CHANGE UP (ref 70–99)
HCT VFR BLD CALC: 46.8 % — SIGNIFICANT CHANGE UP (ref 39–50)
HGB BLD-MCNC: 15 G/DL — SIGNIFICANT CHANGE UP (ref 13–17)
IANC: 3.11 K/UL — SIGNIFICANT CHANGE UP (ref 1.8–7.4)
IMM GRANULOCYTES NFR BLD AUTO: 0.3 % — SIGNIFICANT CHANGE UP (ref 0–0.9)
LYMPHOCYTES # BLD AUTO: 2.42 K/UL — SIGNIFICANT CHANGE UP (ref 1–3.3)
LYMPHOCYTES # BLD AUTO: 35 % — SIGNIFICANT CHANGE UP (ref 13–44)
MCHC RBC-ENTMCNC: 29.3 PG — SIGNIFICANT CHANGE UP (ref 27–34)
MCHC RBC-ENTMCNC: 32.1 GM/DL — SIGNIFICANT CHANGE UP (ref 32–36)
MCV RBC AUTO: 91.4 FL — SIGNIFICANT CHANGE UP (ref 80–100)
MONOCYTES # BLD AUTO: 0.7 K/UL — SIGNIFICANT CHANGE UP (ref 0–0.9)
MONOCYTES NFR BLD AUTO: 10.1 % — SIGNIFICANT CHANGE UP (ref 2–14)
NEUTROPHILS # BLD AUTO: 3.11 K/UL — SIGNIFICANT CHANGE UP (ref 1.8–7.4)
NEUTROPHILS NFR BLD AUTO: 45.1 % — SIGNIFICANT CHANGE UP (ref 43–77)
NRBC # BLD: 0 /100 WBCS — SIGNIFICANT CHANGE UP (ref 0–0)
NRBC # FLD: 0 K/UL — SIGNIFICANT CHANGE UP (ref 0–0)
PLATELET # BLD AUTO: 210 K/UL — SIGNIFICANT CHANGE UP (ref 150–400)
POTASSIUM SERPL-MCNC: 4.5 MMOL/L — SIGNIFICANT CHANGE UP (ref 3.5–5.3)
POTASSIUM SERPL-SCNC: 4.5 MMOL/L — SIGNIFICANT CHANGE UP (ref 3.5–5.3)
PROT SERPL-MCNC: 7 G/DL — SIGNIFICANT CHANGE UP (ref 6–8.3)
RBC # BLD: 5.12 M/UL — SIGNIFICANT CHANGE UP (ref 4.2–5.8)
RBC # FLD: 13.6 % — SIGNIFICANT CHANGE UP (ref 10.3–14.5)
SODIUM SERPL-SCNC: 144 MMOL/L — SIGNIFICANT CHANGE UP (ref 135–145)
WBC # BLD: 6.91 K/UL — SIGNIFICANT CHANGE UP (ref 3.8–10.5)
WBC # FLD AUTO: 6.91 K/UL — SIGNIFICANT CHANGE UP (ref 3.8–10.5)

## 2024-02-22 PROCEDURE — 99232 SBSQ HOSP IP/OBS MODERATE 35: CPT

## 2024-02-22 RX ORDER — OLANZAPINE 15 MG/1
1.25 TABLET, FILM COATED ORAL ONCE
Refills: 0 | Status: COMPLETED | OUTPATIENT
Start: 2024-02-22 | End: 2024-02-22

## 2024-02-22 RX ORDER — QUETIAPINE FUMARATE 200 MG/1
75 TABLET, FILM COATED ORAL
Refills: 0 | Status: DISCONTINUED | OUTPATIENT
Start: 2024-02-22 | End: 2024-02-27

## 2024-02-22 RX ADMIN — Medication 2 DROP(S): at 13:09

## 2024-02-22 RX ADMIN — SENNA PLUS 2 TABLET(S): 8.6 TABLET ORAL at 21:04

## 2024-02-22 RX ADMIN — MEMANTINE HYDROCHLORIDE 5 MILLIGRAM(S): 10 TABLET ORAL at 13:09

## 2024-02-22 RX ADMIN — CITALOPRAM 20 MILLIGRAM(S): 10 TABLET, FILM COATED ORAL at 13:09

## 2024-02-22 RX ADMIN — Medication 2 DROP(S): at 05:09

## 2024-02-22 RX ADMIN — OLANZAPINE 1.25 MILLIGRAM(S): 15 TABLET, FILM COATED ORAL at 00:41

## 2024-02-22 RX ADMIN — Medication 3 MILLIGRAM(S): at 21:04

## 2024-02-22 RX ADMIN — Medication 1 APPLICATION(S): at 21:05

## 2024-02-22 RX ADMIN — Medication 2 DROP(S): at 00:09

## 2024-02-22 RX ADMIN — ENOXAPARIN SODIUM 40 MILLIGRAM(S): 100 INJECTION SUBCUTANEOUS at 12:59

## 2024-02-22 RX ADMIN — QUETIAPINE FUMARATE 75 MILLIGRAM(S): 200 TABLET, FILM COATED ORAL at 18:17

## 2024-02-22 RX ADMIN — Medication 1 MILLIGRAM(S): at 13:09

## 2024-02-22 RX ADMIN — Medication 2 DROP(S): at 18:16

## 2024-02-22 RX ADMIN — CHLORHEXIDINE GLUCONATE 1 APPLICATION(S): 213 SOLUTION TOPICAL at 13:09

## 2024-02-22 RX ADMIN — AMLODIPINE BESYLATE 5 MILLIGRAM(S): 2.5 TABLET ORAL at 05:09

## 2024-02-22 NOTE — BH CONSULTATION LIAISON PROGRESS NOTE - NSBHFUPINTERVALHXFT_PSY_A_CORE
Chart reviewed. Overnight, patient received the following PRNs: Zyprexa 1.25mg IM x 1 for agitation. Compliant with standing medication.     Patient seen and examined with aide at bedside. On exam, patient is calm, cooperative, and disoriented (AAOx1-2). Patient reporting nasal congestion, but no complaints otherwise (per PCA, pt. w/ chronic hx of nasal congestion). Patient reports that he slept last night, does not elaborate on the quality of his sleep. Reports fair appetite. Reports mood is "good". No reported SIIP/HIIP/AVH.    Chart reviewed. Overnight, patient received the following PRNs: Zyprexa 1.25mg IM x 1 for agitation. Compliant with standing medication.     Patient seen and examined with aide at bedside. On exam, patient is calm, cooperative, and disoriented (AAOx1-2). Patient reporting nasal congestion, but no complaints otherwise (per PCA, pt. w/ chronic hx of nasal congestion). Patient reports that he slept last night, does not elaborate on the quality of his sleep. Reports fair appetite. Reports mood is "good". No reported SIIP/HIIP/AVH.     LVM  for patient's son, Regino Jalloh to discuss treatment/dispo plan, awaiting response.

## 2024-02-22 NOTE — BH CONSULTATION LIAISON PROGRESS NOTE - NSBHASSESSMENTFT_PSY_ALL_CORE
82 yo male with pmhx of Dementia (?Alzheimer's), cataracts s/p cataract surgery (on 1/20/24), hypertension, and xerotic dermatitis, p/w agitation for the past 2 days with 1 week of insomnia. Psych consulted for agitation. Pt presents w/ dementia hx now worsened by recent insomnia, unclear precipitants. Warrants medical as well as psychiatric optimization.     2/21- In behavorial control. No PRNs required for agitation overnight.  Compliant with standing medication.  2/22- Received Zyprexa PRN overnight, but in behavorial control on exam. no SIIP    Plan:   - Observation per 7S team; can [facility] if/when available  - Delirium w/u and medical stabilization as you are (f/u labs, bladder scan)  - c/w Seroquel to 50mg PO Q 7PM to prevent sundowning/agitation  - C/W Melatonin, Memantine  - c/w Celexa 20mg daily, first dose today  - Lacks capacity, cannot leave AMA  - Dispo: TBD, will continue to assess, though likely a return to community/penitentiary with ongoing outpt care.     82 yo male with pmhx of Dementia (?Alzheimer's), cataracts s/p cataract surgery (on 1/20/24), hypertension, and xerotic dermatitis, p/w agitation for the past 2 days with 1 week of insomnia. Psych consulted for agitation. Pt presents w/ dementia hx now worsened by recent insomnia, unclear precipitants. Warrants medical as well as psychiatric optimization.     2/21- In behavorial control. No PRNs required for agitation overnight.  Compliant with standing medication.  2/22- Received Zyprexa PRN overnight, but in behavorial control on exam. no SIIP    Plan:   - Observation per 7S team; can utilize shelter aid if/when available  - Delirium w/u and medical stabilization as you are (f/u labs, bladder scan)  - Increase Seroquel to 75mg PO Q 7PM to prevent sundowning/agitation  - C/W Melatonin, Memantine  - c/w Celexa 20mg daily, first dose today  - Lacks capacity, cannot leave AMA  - Dispo: TBD, will continue to assess, though likely a return to community/[facility] with ongoing outpt care.

## 2024-02-22 NOTE — PROGRESS NOTE ADULT - PROBLEM SELECTOR PLAN 2
Raul Samuel is calling again to request to speak to Tess regarding the patient. Please call when available. Thank you.    - Reportedly had witnessed fall at nursing home. Unclear cause.  Has gait difficulty (uses walker at baseline)  - CT Head on admission negative for bleed  - + orthostatics, given 500cc IVFs, repeat orthostatics negative  - PT recs return to sending facility with PT  - Fall precautions

## 2024-02-23 DIAGNOSIS — G30.9 ALZHEIMER'S DISEASE, UNSPECIFIED: ICD-10-CM

## 2024-02-23 DIAGNOSIS — I10 ESSENTIAL (PRIMARY) HYPERTENSION: ICD-10-CM

## 2024-02-23 LAB
ALBUMIN SERPL ELPH-MCNC: 3.4 G/DL — SIGNIFICANT CHANGE UP (ref 3.3–5)
ALP SERPL-CCNC: 89 U/L — SIGNIFICANT CHANGE UP (ref 40–120)
ALT FLD-CCNC: 10 U/L — SIGNIFICANT CHANGE UP (ref 4–41)
ANION GAP SERPL CALC-SCNC: 10 MMOL/L — SIGNIFICANT CHANGE UP (ref 7–14)
AST SERPL-CCNC: 21 U/L — SIGNIFICANT CHANGE UP (ref 4–40)
BILIRUB SERPL-MCNC: 0.3 MG/DL — SIGNIFICANT CHANGE UP (ref 0.2–1.2)
BUN SERPL-MCNC: 13 MG/DL — SIGNIFICANT CHANGE UP (ref 7–23)
CALCIUM SERPL-MCNC: 9 MG/DL — SIGNIFICANT CHANGE UP (ref 8.4–10.5)
CHLORIDE SERPL-SCNC: 106 MMOL/L — SIGNIFICANT CHANGE UP (ref 98–107)
CO2 SERPL-SCNC: 25 MMOL/L — SIGNIFICANT CHANGE UP (ref 22–31)
CREAT SERPL-MCNC: 0.96 MG/DL — SIGNIFICANT CHANGE UP (ref 0.5–1.3)
EGFR: 79 ML/MIN/1.73M2 — SIGNIFICANT CHANGE UP
GLUCOSE SERPL-MCNC: 75 MG/DL — SIGNIFICANT CHANGE UP (ref 70–99)
HCT VFR BLD CALC: 42 % — SIGNIFICANT CHANGE UP (ref 39–50)
HGB BLD-MCNC: 13.7 G/DL — SIGNIFICANT CHANGE UP (ref 13–17)
MAGNESIUM SERPL-MCNC: 2 MG/DL — SIGNIFICANT CHANGE UP (ref 1.6–2.6)
MCHC RBC-ENTMCNC: 29.4 PG — SIGNIFICANT CHANGE UP (ref 27–34)
MCHC RBC-ENTMCNC: 32.6 GM/DL — SIGNIFICANT CHANGE UP (ref 32–36)
MCV RBC AUTO: 90.1 FL — SIGNIFICANT CHANGE UP (ref 80–100)
NRBC # BLD: 0 /100 WBCS — SIGNIFICANT CHANGE UP (ref 0–0)
NRBC # FLD: 0 K/UL — SIGNIFICANT CHANGE UP (ref 0–0)
PHOSPHATE SERPL-MCNC: 3.5 MG/DL — SIGNIFICANT CHANGE UP (ref 2.5–4.5)
PLATELET # BLD AUTO: 191 K/UL — SIGNIFICANT CHANGE UP (ref 150–400)
POTASSIUM SERPL-MCNC: 4.3 MMOL/L — SIGNIFICANT CHANGE UP (ref 3.5–5.3)
POTASSIUM SERPL-SCNC: 4.3 MMOL/L — SIGNIFICANT CHANGE UP (ref 3.5–5.3)
PROT SERPL-MCNC: 6.5 G/DL — SIGNIFICANT CHANGE UP (ref 6–8.3)
RBC # BLD: 4.66 M/UL — SIGNIFICANT CHANGE UP (ref 4.2–5.8)
RBC # FLD: 13.5 % — SIGNIFICANT CHANGE UP (ref 10.3–14.5)
SODIUM SERPL-SCNC: 141 MMOL/L — SIGNIFICANT CHANGE UP (ref 135–145)
WBC # BLD: 7.46 K/UL — SIGNIFICANT CHANGE UP (ref 3.8–10.5)
WBC # FLD AUTO: 7.46 K/UL — SIGNIFICANT CHANGE UP (ref 3.8–10.5)

## 2024-02-23 PROCEDURE — 99232 SBSQ HOSP IP/OBS MODERATE 35: CPT

## 2024-02-23 RX ADMIN — Medication 2 DROP(S): at 00:08

## 2024-02-23 RX ADMIN — Medication 2 DROP(S): at 12:09

## 2024-02-23 RX ADMIN — Medication 2 DROP(S): at 05:07

## 2024-02-23 RX ADMIN — Medication 2 DROP(S): at 23:11

## 2024-02-23 RX ADMIN — Medication 2 DROP(S): at 17:29

## 2024-02-23 RX ADMIN — QUETIAPINE FUMARATE 75 MILLIGRAM(S): 200 TABLET, FILM COATED ORAL at 18:09

## 2024-02-23 RX ADMIN — Medication 1 MILLIGRAM(S): at 12:09

## 2024-02-23 RX ADMIN — Medication 3 MILLIGRAM(S): at 21:15

## 2024-02-23 RX ADMIN — CHLORHEXIDINE GLUCONATE 1 APPLICATION(S): 213 SOLUTION TOPICAL at 12:19

## 2024-02-23 RX ADMIN — CITALOPRAM 20 MILLIGRAM(S): 10 TABLET, FILM COATED ORAL at 12:09

## 2024-02-23 RX ADMIN — ENOXAPARIN SODIUM 40 MILLIGRAM(S): 100 INJECTION SUBCUTANEOUS at 12:09

## 2024-02-23 RX ADMIN — Medication 1 APPLICATION(S): at 21:13

## 2024-02-23 RX ADMIN — MEMANTINE HYDROCHLORIDE 5 MILLIGRAM(S): 10 TABLET ORAL at 12:09

## 2024-02-23 RX ADMIN — AMLODIPINE BESYLATE 5 MILLIGRAM(S): 2.5 TABLET ORAL at 05:06

## 2024-02-23 RX ADMIN — SENNA PLUS 2 TABLET(S): 8.6 TABLET ORAL at 21:15

## 2024-02-23 NOTE — BH CONSULTATION LIAISON PROGRESS NOTE - NSBHFUPINTERVALHXFT_PSY_A_CORE
Chart reviewed. Overnight, patient did not receive or require PRNs. Per nursing staff, patient agitated this morning (attempting to kick staff), but re-directable. Compliant with standing medications.     Patient seen and examined with aide at bedside. On exam, patient is pleasant, remains disoriented (AAOx1-2) with periods of derailed speech. Continues to report hx of nasal congestion. Patient reports good sleep. States he is hungry, requesting crackers. Reports mood is "good". No reported SIIP/HIIP/AVH.     Spoke to patient's son: Reports concerns of potential insomnia inducing agitated behavior. Reports he was hospitalized at [facility] (unclear if psychiatrically or medically) for oversedation?. At the time, patient was on high dose of Seroquel (unsure of dose). Discussed potential dispo to [facility], which he was in agreement with.

## 2024-02-23 NOTE — BH CONSULTATION LIAISON PROGRESS NOTE - NSBHATTESTCOMMENTATTENDFT_PSY_A_CORE
No
Chart reviewed. Discussed in IDRs. Pt seen with resident, calm, oriented to self only (baseline). No major issues/distress/complaints communicated though pleasantly confused with low insight. Agree with above assessment/recs. Will continue to follow
Chart reviewed. Discussed with resident. Seen separately. Pt AA o x 1, in NAD, pleasant, though confused. Poor insight. Not as disorganized. Poor historian. Nonfocal exam. Agree with above assessment/recs. Will continue to follow and determine dispo early next week after period of observation.
Chart reviewed. Discussed with trainee. Seen separately. Remains pleasantly confused, oriented to self only. At baseline. Continued aggression at night demands change in med as above. Agree with above assessment/recs. Will continue to follow

## 2024-02-23 NOTE — BH CONSULTATION LIAISON PROGRESS NOTE - NSBHASSESSMENTFT_PSY_ALL_CORE
84 yo male with pmhx of Dementia (?Alzheimer's), cataracts s/p cataract surgery (on 1/20/24), hypertension, and xerotic dermatitis, p/w agitation for the past 2 days with 1 week of insomnia. Psych consulted for agitation. Pt presents w/ dementia hx now worsened by recent insomnia, unclear precipitants. Warrants medical as well as psychiatric optimization.     2/21- In behavorial control. No PRNs required for agitation overnight.  Compliant with standing medication.  2/22- Received Zyprexa PRN overnight, but in behavorial control on exam. no SIIP  2/23-No PRNs overnight, remains w/ intermittent periods of agitation. no SIIP    Plan:   - Observation per 7S team; can utilize [facility] aid if/when available  - Delirium w/u and medical stabilization as you are (f/u labs, bladder scan)  - Increase Seroquel to 75mg PO Q 7PM to prevent sundowning/agitation  - C/W Melatonin, Memantine  - c/w Celexa 20mg daily, first dose today  - Lacks capacity, cannot leave AMA  - Dispo: TBD, will continue to assess, though likely a return to community/senior living with ongoing outpt care.     84 yo male with pmhx of Dementia (?Alzheimer's), cataracts s/p cataract surgery (on 1/20/24), hypertension, and xerotic dermatitis, p/w agitation for the past 2 days with 1 week of insomnia. Psych consulted for agitation. Pt presents w/ dementia hx now worsened by recent insomnia, unclear precipitants. Warrants medical as well as psychiatric optimization.     2/21- In behavorial control. No PRNs required for agitation overnight.  Compliant with standing medication.  2/22- Received Zyprexa PRN overnight, but in behavorial control on exam. no SIIP  2/23-No PRNs overnight, remains w/ intermittent periods of agitation. no SIIP    Plan:   - Observation per 7S team; can utilize [facility] aid if/when available  - Delirium w/u and medical stabilization as you are (f/u labs, bladder scan)  - c/w Seroquel to 75mg PO Q 7PM to prevent sundowning/agitation  - C/W Melatonin, Memantine  - c/w Celexa 20mg daily, first dose today  - Lacks capacity, cannot leave AMA  - Dispo: TBD, will continue to assess, though likely a return to community/intermediate with ongoing outpt care.     84 yo male with pmhx of Dementia (?Alzheimer's), cataracts s/p cataract surgery (on 1/20/24), hypertension, and xerotic dermatitis, p/w agitation for the past 2 days with 1 week of insomnia. Psych consulted for agitation. Pt presents w/ dementia hx now worsened by recent insomnia, unclear precipitants. Warrants medical as well as psychiatric optimization.     2/21- In behavorial control. No PRNs required for agitation overnight.  Compliant with standing medication.  2/22- Received Zyprexa PRN overnight, but in behavorial control on exam. no SIIP  2/23-No PRNs overnight, remains w/ intermittent periods of agitation. no SIIP    Plan:   - Observation per 7S team; can utilize [facility] aid if/when available  - Delirium w/u and medical stabilization as you are (f/u labs, bladder scan)  - c/w Seroquel 75mg PO Q 7PM to prevent sundowning/agitation  - C/W Melatonin, Memantine  - c/w Celexa 20mg daily, first dose today  - Lacks capacity, cannot leave AMA  - Dispo: TBD, will continue to assess, though likely a return to community/[facility] with ongoing outpt care.

## 2024-02-23 NOTE — PROGRESS NOTE ADULT - PROBLEM SELECTOR PLAN 2
- Reportedly had witnessed fall at nursing home. Unclear cause.  Has gait difficulty (uses walker at baseline)  - CT Head on admission negative for bleed  - + orthostatics, given 500cc IVFs, repeat orthostatics negative  - PT recs return to sending facility with PT  - Fall precautions

## 2024-02-24 PROCEDURE — 99232 SBSQ HOSP IP/OBS MODERATE 35: CPT

## 2024-02-24 RX ORDER — OLANZAPINE 15 MG/1
1.25 TABLET, FILM COATED ORAL ONCE
Refills: 0 | Status: COMPLETED | OUTPATIENT
Start: 2024-02-24 | End: 2024-02-24

## 2024-02-24 RX ADMIN — OLANZAPINE 1.25 MILLIGRAM(S): 15 TABLET, FILM COATED ORAL at 09:22

## 2024-02-24 RX ADMIN — Medication 2 DROP(S): at 17:08

## 2024-02-24 RX ADMIN — ENOXAPARIN SODIUM 40 MILLIGRAM(S): 100 INJECTION SUBCUTANEOUS at 12:28

## 2024-02-24 RX ADMIN — OLANZAPINE 1.25 MILLIGRAM(S): 15 TABLET, FILM COATED ORAL at 22:57

## 2024-02-24 RX ADMIN — MEMANTINE HYDROCHLORIDE 5 MILLIGRAM(S): 10 TABLET ORAL at 12:28

## 2024-02-24 RX ADMIN — Medication 3 MILLIGRAM(S): at 21:46

## 2024-02-24 RX ADMIN — CHLORHEXIDINE GLUCONATE 1 APPLICATION(S): 213 SOLUTION TOPICAL at 11:38

## 2024-02-24 RX ADMIN — CITALOPRAM 20 MILLIGRAM(S): 10 TABLET, FILM COATED ORAL at 12:33

## 2024-02-24 RX ADMIN — Medication 1 MILLIGRAM(S): at 12:28

## 2024-02-24 RX ADMIN — Medication 2 DROP(S): at 05:53

## 2024-02-24 RX ADMIN — SENNA PLUS 2 TABLET(S): 8.6 TABLET ORAL at 21:47

## 2024-02-24 RX ADMIN — AMLODIPINE BESYLATE 5 MILLIGRAM(S): 2.5 TABLET ORAL at 05:52

## 2024-02-24 RX ADMIN — QUETIAPINE FUMARATE 75 MILLIGRAM(S): 200 TABLET, FILM COATED ORAL at 18:01

## 2024-02-24 RX ADMIN — Medication 1 APPLICATION(S): at 21:48

## 2024-02-24 RX ADMIN — Medication 2 DROP(S): at 11:38

## 2024-02-24 NOTE — CHART NOTE - NSCHARTNOTEFT_GEN_A_CORE
Provider was notified by RN that patient was very agitated, kicking and trying to get out of bed.  At bedside staff was assisting patient, Zyprexa 1.25mg IVP was ordered, previously medicated with Seroquel 75mg, Melatonin 3mg.  Safety maintained, will continue to monitor.

## 2024-02-25 PROCEDURE — 99232 SBSQ HOSP IP/OBS MODERATE 35: CPT

## 2024-02-25 RX ORDER — OLANZAPINE 15 MG/1
2.5 TABLET, FILM COATED ORAL ONCE
Refills: 0 | Status: COMPLETED | OUTPATIENT
Start: 2024-02-25 | End: 2024-02-25

## 2024-02-25 RX ORDER — OLANZAPINE 15 MG/1
1.25 TABLET, FILM COATED ORAL ONCE
Refills: 0 | Status: COMPLETED | OUTPATIENT
Start: 2024-02-25 | End: 2024-02-25

## 2024-02-25 RX ADMIN — Medication 3 MILLIGRAM(S): at 21:43

## 2024-02-25 RX ADMIN — Medication 2 DROP(S): at 23:23

## 2024-02-25 RX ADMIN — Medication 1 MILLIGRAM(S): at 11:38

## 2024-02-25 RX ADMIN — OLANZAPINE 2.5 MILLIGRAM(S): 15 TABLET, FILM COATED ORAL at 01:08

## 2024-02-25 RX ADMIN — SENNA PLUS 2 TABLET(S): 8.6 TABLET ORAL at 21:43

## 2024-02-25 RX ADMIN — CHLORHEXIDINE GLUCONATE 1 APPLICATION(S): 213 SOLUTION TOPICAL at 11:50

## 2024-02-25 RX ADMIN — Medication 2 DROP(S): at 17:12

## 2024-02-25 RX ADMIN — Medication 1 APPLICATION(S): at 21:44

## 2024-02-25 RX ADMIN — AMLODIPINE BESYLATE 5 MILLIGRAM(S): 2.5 TABLET ORAL at 06:28

## 2024-02-25 RX ADMIN — Medication 2 DROP(S): at 11:50

## 2024-02-25 RX ADMIN — Medication 2 DROP(S): at 06:28

## 2024-02-25 RX ADMIN — OLANZAPINE 1.25 MILLIGRAM(S): 15 TABLET, FILM COATED ORAL at 14:43

## 2024-02-25 RX ADMIN — CITALOPRAM 20 MILLIGRAM(S): 10 TABLET, FILM COATED ORAL at 11:38

## 2024-02-25 RX ADMIN — ENOXAPARIN SODIUM 40 MILLIGRAM(S): 100 INJECTION SUBCUTANEOUS at 11:50

## 2024-02-25 RX ADMIN — MEMANTINE HYDROCHLORIDE 5 MILLIGRAM(S): 10 TABLET ORAL at 11:38

## 2024-02-25 RX ADMIN — QUETIAPINE FUMARATE 75 MILLIGRAM(S): 200 TABLET, FILM COATED ORAL at 17:12

## 2024-02-25 NOTE — PROGRESS NOTE ADULT - TIME BILLING
Time-based billing (NON-critical care).     40 minutes spent on total encounter; more than 50% of the visit was spent counseling and / or coordinating care by the attending physician.  The necessity of the time spent during the encounter on this date of service was due to:     documentation in [place], reviewing chart and coordinating care with patient/ACP and interdisciplinary staff (such as , social workers, etc) as well as reviewing vitals, laboratory data, radiology, medication list, consultants' recommendations and prior records. Interventions were performed as documented above.
Time-based billing (NON-critical care).     50 minutes spent on total encounter; more than 50% of the visit was spent counseling and / or coordinating care by the attending physician.  The necessity of the time spent during the encounter on this date of service was due to:     documentation in [place], reviewing chart and coordinating care with patient/ACP and interdisciplinary staff (such as , social workers, etc) as well as reviewing vitals, laboratory data, radiology, medication list, consultants' recommendations and prior records. Interventions were performed as documented above.
Time-based billing (NON-critical care).     40 minutes spent on total encounter; more than 50% of the visit was spent counseling and / or coordinating care by the attending physician.  The necessity of the time spent during the encounter on this date of service was due to:     documentation in [place], reviewing chart and coordinating care with patient/ACP and interdisciplinary staff (such as , social workers, etc) as well as reviewing vitals, laboratory data, radiology, medication list, consultants' recommendations and prior records. Interventions were performed as documented above.
Time-based billing (NON-critical care).     50 minutes spent on total encounter; more than 50% of the visit was spent counseling and / or coordinating care by the attending physician.  The necessity of the time spent during the encounter on this date of service was due to:     documentation in [place], reviewing chart and coordinating care with patient/ACP and interdisciplinary staff (such as , social workers, etc) as well as reviewing vitals, laboratory data, radiology, medication list, consultants' recommendations and prior records. Interventions were performed as documented above.
Time-based billing (NON-critical care).     40 minutes spent on total encounter; more than 50% of the visit was spent counseling and / or coordinating care by the attending physician.  The necessity of the time spent during the encounter on this date of service was due to:     documentation in [place], reviewing chart and coordinating care with patient/ACP and interdisciplinary staff (such as , social workers, etc) as well as reviewing vitals, laboratory data, radiology, medication list, consultants' recommendations and prior records. Interventions were performed as documented above.
Time-based billing (NON-critical care).     40 minutes spent on total encounter; more than 50% of the visit was spent counseling and / or coordinating care by the attending physician.  The necessity of the time spent during the encounter on this date of service was due to:     documentation in [place], reviewing chart and coordinating care with patient/ACP and interdisciplinary staff (such as , social workers, etc) as well as reviewing vitals, laboratory data, radiology, medication list, consultants' recommendations and prior records. Interventions were performed as documented above.

## 2024-02-25 NOTE — CHART NOTE - NSCHARTNOTEFT_GEN_A_CORE
OVERNIGHT MEDICINE ACP COVERAGE    Called by nursing that pt punching/kicking staff, not redirectable despite getting 1.25mg IM zyprexa earlier in night. Pt is a fall risk and not allowing any staff near him to assist, attempting multiple times to get out of bed.   Last EKG with QTc <500, additional 2.5mg IM zyprexa x1 ordered now. RN to administer and reassess.     GRADY Cline PA-C  [id number] OVERNIGHT MEDICINE ACP COVERAGE    Called by nursing that pt punching/kicking staff, not redirectable despite getting 1.25mg IM zyprexa earlier in night. Pt is a fall risk and not allowing any staff near him to assist, attempting multiple times to get out of bed.   Last EKG with QTc <500, additional 2.5mg IM zyprexa x1 ordered now. RN to administer and reassess.     GRADY Cline PA-C  [id number]    ------------------------------    Pt reassessed at bedside around 04:00 - calmer and sleeping s/p additional zyprexa. Continue to monitor

## 2024-02-26 LAB — SARS-COV-2 RNA SPEC QL NAA+PROBE: SIGNIFICANT CHANGE UP

## 2024-02-26 PROCEDURE — 99232 SBSQ HOSP IP/OBS MODERATE 35: CPT

## 2024-02-26 RX ORDER — OLANZAPINE 15 MG/1
1.25 TABLET, FILM COATED ORAL ONCE
Refills: 0 | Status: COMPLETED | OUTPATIENT
Start: 2024-02-26 | End: 2024-02-26

## 2024-02-26 RX ORDER — LANOLIN ALCOHOL/MO/W.PET/CERES
3 CREAM (GRAM) TOPICAL ONCE
Refills: 0 | Status: COMPLETED | OUTPATIENT
Start: 2024-02-26 | End: 2024-02-26

## 2024-02-26 RX ADMIN — CHLORHEXIDINE GLUCONATE 1 APPLICATION(S): 213 SOLUTION TOPICAL at 11:59

## 2024-02-26 RX ADMIN — CITALOPRAM 20 MILLIGRAM(S): 10 TABLET, FILM COATED ORAL at 12:01

## 2024-02-26 RX ADMIN — Medication 2 DROP(S): at 05:26

## 2024-02-26 RX ADMIN — MEMANTINE HYDROCHLORIDE 5 MILLIGRAM(S): 10 TABLET ORAL at 12:01

## 2024-02-26 RX ADMIN — Medication 1 MILLIGRAM(S): at 12:05

## 2024-02-26 RX ADMIN — AMLODIPINE BESYLATE 5 MILLIGRAM(S): 2.5 TABLET ORAL at 05:26

## 2024-02-26 RX ADMIN — Medication 3 MILLIGRAM(S): at 01:05

## 2024-02-26 RX ADMIN — ENOXAPARIN SODIUM 40 MILLIGRAM(S): 100 INJECTION SUBCUTANEOUS at 12:01

## 2024-02-26 RX ADMIN — SENNA PLUS 2 TABLET(S): 8.6 TABLET ORAL at 21:01

## 2024-02-26 RX ADMIN — OLANZAPINE 1.25 MILLIGRAM(S): 15 TABLET, FILM COATED ORAL at 23:13

## 2024-02-26 RX ADMIN — QUETIAPINE FUMARATE 75 MILLIGRAM(S): 200 TABLET, FILM COATED ORAL at 17:06

## 2024-02-26 RX ADMIN — Medication 2 DROP(S): at 12:01

## 2024-02-26 RX ADMIN — Medication 1 APPLICATION(S): at 21:01

## 2024-02-26 RX ADMIN — OLANZAPINE 1.25 MILLIGRAM(S): 15 TABLET, FILM COATED ORAL at 00:40

## 2024-02-26 RX ADMIN — Medication 2 DROP(S): at 17:07

## 2024-02-26 RX ADMIN — Medication 3 MILLIGRAM(S): at 21:01

## 2024-02-26 NOTE — BH CONSULTATION LIAISON PROGRESS NOTE - NSBHASSESSMENTFT_PSY_ALL_CORE
82 yo male with pmhx of Dementia (?Alzheimer's), cataracts s/p cataract surgery (on 1/20/24), hypertension, and xerotic dermatitis, p/w agitation for the past 2 days with 1 week of insomnia. Psych consulted for agitation. Pt presents w/ dementia hx now worsened by recent insomnia, unclear precipitants. Warrants medical as well as psychiatric optimization.     2/21- In behavorial control. No PRNs required for agitation overnight.  Compliant with standing medication.  2/22- Received Zyprexa PRN overnight, but in behavorial control on exam. no SIIP  2/23-No PRNs overnight, remains w/ intermittent periods of agitation. no SIIP  2/26: continued PRNs over weekend, not sleeping at night. No SIIP though poor insight/impulse control    Plan:   - Observation per 7S team; can utilize residential aid if/when available  - Delirium w/u and medical stabilization as you are (f/u labs, bladder scan)  - c/w Seroquel 75mg PO Q 7PM to prevent sundowning/agitation  - C/W Melatonin, Memantine  - c/w Celexa 20mg daily, first dose today  - Lacks capacity, cannot leave AMA  - Dispo: inpt [facility] as this level of care is not controlling his behaviors.

## 2024-02-26 NOTE — BH CONSULTATION LIAISON PROGRESS NOTE - NSBHFUPINTERVALHXFT_PSY_A_CORE
Chart reviewed. Received PRNs regularly over weekend and overnight. Pt calm, in bed, in NAD, disrorganized, oriented to self only. Denies complaints. Says he has "lots to do." Insight poor.

## 2024-02-27 PROCEDURE — 99232 SBSQ HOSP IP/OBS MODERATE 35: CPT

## 2024-02-27 RX ORDER — QUETIAPINE FUMARATE 200 MG/1
100 TABLET, FILM COATED ORAL
Refills: 0 | Status: DISCONTINUED | OUTPATIENT
Start: 2024-02-27 | End: 2024-03-07

## 2024-02-27 RX ADMIN — Medication 1 APPLICATION(S): at 21:35

## 2024-02-27 RX ADMIN — ENOXAPARIN SODIUM 40 MILLIGRAM(S): 100 INJECTION SUBCUTANEOUS at 12:17

## 2024-02-27 RX ADMIN — Medication 3 MILLIGRAM(S): at 21:35

## 2024-02-27 RX ADMIN — SENNA PLUS 2 TABLET(S): 8.6 TABLET ORAL at 21:35

## 2024-02-27 RX ADMIN — Medication 2 DROP(S): at 00:11

## 2024-02-27 RX ADMIN — CITALOPRAM 20 MILLIGRAM(S): 10 TABLET, FILM COATED ORAL at 12:18

## 2024-02-27 RX ADMIN — QUETIAPINE FUMARATE 100 MILLIGRAM(S): 200 TABLET, FILM COATED ORAL at 18:50

## 2024-02-27 RX ADMIN — Medication 1 MILLIGRAM(S): at 12:18

## 2024-02-27 RX ADMIN — MEMANTINE HYDROCHLORIDE 5 MILLIGRAM(S): 10 TABLET ORAL at 12:18

## 2024-02-27 RX ADMIN — AMLODIPINE BESYLATE 5 MILLIGRAM(S): 2.5 TABLET ORAL at 05:51

## 2024-02-27 RX ADMIN — Medication 2 DROP(S): at 12:19

## 2024-02-27 RX ADMIN — Medication 2 DROP(S): at 17:42

## 2024-02-27 RX ADMIN — CHLORHEXIDINE GLUCONATE 1 APPLICATION(S): 213 SOLUTION TOPICAL at 12:19

## 2024-02-27 RX ADMIN — Medication 2 DROP(S): at 06:50

## 2024-02-27 NOTE — BH CONSULTATION LIAISON PROGRESS NOTE - NSBHFUPINTERVALHXFT_PSY_A_CORE
Chart reviewed. Discussed in IDRs. Received PRN overnight. On exam today, is AA O x 1, calm, pleasantly disorganized/confused/disoriented. Delirium/dementia blanket nearby. Confabulates often. Denies concerns/complaints. Asking for lip balm. Insight impaired.

## 2024-02-27 NOTE — BH CONSULTATION LIAISON PROGRESS NOTE - NSBHASSESSMENTFT_PSY_ALL_CORE
82 yo male with pmhx of Dementia (?Alzheimer's), cataracts s/p cataract surgery (on 1/20/24), hypertension, and xerotic dermatitis, p/w agitation for the past 2 days with 1 week of insomnia. Psych consulted for agitation. Pt presents w/ dementia hx now worsened by recent insomnia, unclear precipitants. Warrants medical as well as psychiatric optimization.     2/21- In behavorial control. No PRNs required for agitation overnight.  Compliant with standing medication.  2/22- Received Zyprexa PRN overnight, but in behavorial control on exam. no SIIP  2/23-No PRNs overnight, remains w/ intermittent periods of agitation. no SIIP  2/26: continued PRNs over weekend, not sleeping at night. No SIIP though poor insight/impulse control  2/27: PRN overnight with clear sundowning pattern. Remains disorganized without insight.    Plan:   - Observation per 7S team; can utilize [facility] aid if/when available  - INCREASE Seroquel to 100mg PO Q 7PM to prevent sundowning/agitation  - C/W Melatonin, Memantine  - c/w Celexa 20mg daily, first dose today  - Lacks capacity, cannot leave AMA  - Dispo: inpt [facility] as this level of care is not controlling his behaviors.

## 2024-02-28 PROCEDURE — 99232 SBSQ HOSP IP/OBS MODERATE 35: CPT

## 2024-02-28 RX ADMIN — Medication 1 MILLIGRAM(S): at 13:03

## 2024-02-28 RX ADMIN — SENNA PLUS 2 TABLET(S): 8.6 TABLET ORAL at 21:09

## 2024-02-28 RX ADMIN — Medication 3 MILLIGRAM(S): at 21:09

## 2024-02-28 RX ADMIN — Medication 2 DROP(S): at 06:00

## 2024-02-28 RX ADMIN — AMLODIPINE BESYLATE 5 MILLIGRAM(S): 2.5 TABLET ORAL at 06:00

## 2024-02-28 RX ADMIN — Medication 2 DROP(S): at 13:03

## 2024-02-28 RX ADMIN — QUETIAPINE FUMARATE 100 MILLIGRAM(S): 200 TABLET, FILM COATED ORAL at 18:26

## 2024-02-28 RX ADMIN — ENOXAPARIN SODIUM 40 MILLIGRAM(S): 100 INJECTION SUBCUTANEOUS at 13:04

## 2024-02-28 RX ADMIN — Medication 2 DROP(S): at 17:40

## 2024-02-28 RX ADMIN — Medication 1 APPLICATION(S): at 21:10

## 2024-02-28 RX ADMIN — MEMANTINE HYDROCHLORIDE 5 MILLIGRAM(S): 10 TABLET ORAL at 13:03

## 2024-02-28 RX ADMIN — Medication 2 DROP(S): at 00:03

## 2024-02-28 RX ADMIN — CHLORHEXIDINE GLUCONATE 1 APPLICATION(S): 213 SOLUTION TOPICAL at 13:44

## 2024-02-28 RX ADMIN — Medication 2 DROP(S): at 23:19

## 2024-02-28 RX ADMIN — CITALOPRAM 20 MILLIGRAM(S): 10 TABLET, FILM COATED ORAL at 13:04
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
## 2024-02-28 NOTE — BH CONSULTATION LIAISON PROGRESS NOTE - NSBHASSESSMENTFT_PSY_ALL_CORE
82 yo male with pmhx of Dementia (?Alzheimer's), cataracts s/p cataract surgery (on 1/20/24), hypertension, and xerotic dermatitis, p/w agitation for the past 2 days with 1 week of insomnia. Psych consulted for agitation. Pt presents w/ dementia hx now worsened by recent insomnia, unclear precipitants. Warrants medical as well as psychiatric optimization.     2/21- In behavorial control. No PRNs required for agitation overnight.  Compliant with standing medication.  2/22- Received Zyprexa PRN overnight, but in behavorial control on exam. no SIIP  2/23-No PRNs overnight, remains w/ intermittent periods of agitation. no SIIP  2/26: continued PRNs over weekend, not sleeping at night. No SIIP though poor insight/impulse control  2/27: PRN overnight with clear sundowning pattern. Remains disorganized without insight.  2/28: no PRN overnight, though up at night. Tolerating medication. Remains disorganized without insight.    Plan:   - Observation per 7S team; can utilize [facility] aid if/when available  - C/W Seroquel 100mg PO Q 7PM to prevent sundowning/agitation  - C/W Melatonin, Memantine  - c/w Celexa 20mg daily, first dose today  - Lacks capacity, cannot leave AMA  - Dispo: inpt [facility] as this level of care is not controlling his behaviors.

## 2024-02-29 PROCEDURE — 99232 SBSQ HOSP IP/OBS MODERATE 35: CPT

## 2024-02-29 RX ORDER — DIVALPROEX SODIUM 500 MG/1
250 TABLET, DELAYED RELEASE ORAL
Refills: 0 | Status: DISCONTINUED | OUTPATIENT
Start: 2024-02-29 | End: 2024-03-04

## 2024-02-29 RX ADMIN — MEMANTINE HYDROCHLORIDE 5 MILLIGRAM(S): 10 TABLET ORAL at 11:50

## 2024-02-29 RX ADMIN — Medication 2 DROP(S): at 23:00

## 2024-02-29 RX ADMIN — CHLORHEXIDINE GLUCONATE 1 APPLICATION(S): 213 SOLUTION TOPICAL at 12:45

## 2024-02-29 RX ADMIN — Medication 2 DROP(S): at 11:50

## 2024-02-29 RX ADMIN — AMLODIPINE BESYLATE 5 MILLIGRAM(S): 2.5 TABLET ORAL at 06:02

## 2024-02-29 RX ADMIN — ENOXAPARIN SODIUM 40 MILLIGRAM(S): 100 INJECTION SUBCUTANEOUS at 18:41

## 2024-02-29 RX ADMIN — SENNA PLUS 2 TABLET(S): 8.6 TABLET ORAL at 21:19

## 2024-02-29 RX ADMIN — Medication 3 MILLIGRAM(S): at 21:19

## 2024-02-29 RX ADMIN — Medication 2 DROP(S): at 18:38

## 2024-02-29 RX ADMIN — CITALOPRAM 20 MILLIGRAM(S): 10 TABLET, FILM COATED ORAL at 11:49

## 2024-02-29 RX ADMIN — QUETIAPINE FUMARATE 100 MILLIGRAM(S): 200 TABLET, FILM COATED ORAL at 18:38

## 2024-02-29 RX ADMIN — Medication 1 MILLIGRAM(S): at 11:49

## 2024-02-29 RX ADMIN — DIVALPROEX SODIUM 250 MILLIGRAM(S): 500 TABLET, DELAYED RELEASE ORAL at 18:38

## 2024-02-29 RX ADMIN — Medication 2 DROP(S): at 06:02

## 2024-02-29 RX ADMIN — Medication 1 APPLICATION(S): at 21:20

## 2024-02-29 NOTE — BH CONSULTATION LIAISON PROGRESS NOTE - NSBHASSESSMENTFT_PSY_ALL_CORE
82 yo male with pmhx of Dementia (?Alzheimer's), cataracts s/p cataract surgery (on 1/20/24), hypertension, and xerotic dermatitis, p/w agitation for the past 2 days with 1 week of insomnia. Psych consulted for agitation. Pt presents w/ dementia hx now worsened by recent insomnia, unclear precipitants. Warrants medical as well as psychiatric optimization.     2/21- In behavorial control. No PRNs required for agitation overnight.  Compliant with standing medication.  2/22- Received Zyprexa PRN overnight, but in behavorial control on exam. no SIIP  2/23-No PRNs overnight, remains w/ intermittent periods of agitation. no SIIP  2/26: continued PRNs over weekend, not sleeping at night. No SIIP though poor insight/impulse control  2/27: PRN overnight with clear sundowning pattern. Remains disorganized without insight.  2/28: no PRN overnight, though up at night. Tolerating medication. Remains disorganized without insight.  2/29: no PRN overnight, agitated today, impulsive    Plan:   - Observation per 7S team; can utilize [facility] aid if/when available  - C/W Seroquel 100mg PO Q 7PM to prevent sundowning/agitation  - START Depakote 250mg PO BID standing, first dose.  - C/W Melatonin, Memantine  - c/w Celexa 20mg daily, first dose today  - Lacks capacity, cannot leave AMA  - Dispo: inpt [facility] as this level of care is not controlling his behaviors, 2PC complete

## 2024-02-29 NOTE — BH CONSULTATION LIAISON PROGRESS NOTE - NSBHFUPINTERVALHXFT_PSY_A_CORE
Chart reviewed. No overnight PRNs. On exam he was seen in an excited state, laying in bed but slapping his knees repeatedly while yelling various nonsensical and disinhibited things. He appeared mixed (euphoric but also agitated). He could not be interrupted. Exam thus limited.

## 2024-03-01 PROCEDURE — 99232 SBSQ HOSP IP/OBS MODERATE 35: CPT

## 2024-03-01 RX ADMIN — AMLODIPINE BESYLATE 5 MILLIGRAM(S): 2.5 TABLET ORAL at 05:14

## 2024-03-01 RX ADMIN — Medication 3 MILLIGRAM(S): at 21:31

## 2024-03-01 RX ADMIN — DIVALPROEX SODIUM 250 MILLIGRAM(S): 500 TABLET, DELAYED RELEASE ORAL at 17:24

## 2024-03-01 RX ADMIN — Medication 2 DROP(S): at 05:14

## 2024-03-01 RX ADMIN — Medication 2 DROP(S): at 12:38

## 2024-03-01 RX ADMIN — CHLORHEXIDINE GLUCONATE 1 APPLICATION(S): 213 SOLUTION TOPICAL at 12:36

## 2024-03-01 RX ADMIN — Medication 1 APPLICATION(S): at 21:31

## 2024-03-01 RX ADMIN — Medication 2 DROP(S): at 17:34

## 2024-03-01 RX ADMIN — MEMANTINE HYDROCHLORIDE 5 MILLIGRAM(S): 10 TABLET ORAL at 12:38

## 2024-03-01 RX ADMIN — QUETIAPINE FUMARATE 100 MILLIGRAM(S): 200 TABLET, FILM COATED ORAL at 17:23

## 2024-03-01 RX ADMIN — SENNA PLUS 2 TABLET(S): 8.6 TABLET ORAL at 21:30

## 2024-03-01 RX ADMIN — CITALOPRAM 20 MILLIGRAM(S): 10 TABLET, FILM COATED ORAL at 12:38

## 2024-03-01 RX ADMIN — Medication 2 DROP(S): at 23:00

## 2024-03-01 RX ADMIN — Medication 1 MILLIGRAM(S): at 12:38

## 2024-03-01 RX ADMIN — ENOXAPARIN SODIUM 40 MILLIGRAM(S): 100 INJECTION SUBCUTANEOUS at 12:39

## 2024-03-01 RX ADMIN — DIVALPROEX SODIUM 250 MILLIGRAM(S): 500 TABLET, DELAYED RELEASE ORAL at 05:14

## 2024-03-01 NOTE — BH CONSULTATION LIAISON PROGRESS NOTE - NSBHFUPINTERVALHXFT_PSY_A_CORE
Chart reviewed. No PRNs overnight. Today is AA O x 1, disorganized/confused, confabulating, intrusive/impulsive, disinhibited, sexually-inappropriate with female staff. Nonfocal exam. Denies discomfort/psychiatric distress but lacks insight.

## 2024-03-01 NOTE — BH CONSULTATION LIAISON PROGRESS NOTE - NSBHASSESSMENTFT_PSY_ALL_CORE
84 yo male with pmhx of Dementia (?Alzheimer's), cataracts s/p cataract surgery (on 1/20/24), hypertension, and xerotic dermatitis, p/w agitation for the past 2 days with 1 week of insomnia. Psych consulted for agitation. Pt presents w/ dementia hx now worsened by recent insomnia, unclear precipitants. Warrants medical as well as psychiatric optimization.     2/21- In behavorial control. No PRNs required for agitation overnight.  Compliant with standing medication.  2/22- Received Zyprexa PRN overnight, but in behavorial control on exam. no SIIP  2/23-No PRNs overnight, remains w/ intermittent periods of agitation. no SIIP  2/26: continued PRNs over weekend, not sleeping at night. No SIIP though poor insight/impulse control  2/27: PRN overnight with clear sundowning pattern. Remains disorganized without insight.  2/28: no PRN overnight, though up at night. Tolerating medication. Remains disorganized without insight.  2/29-3/1: no PRN overnight, agitated/sexually inappropriate today, impulsive    Plan:   - Observation per 7S team; can utilize [facility] aid if/when available  - C/W Seroquel 100mg PO Q 7PM to prevent sundowning/agitation  - C/W Depakote 250mg PO BID standing.  - C/W Melatonin, Memantine  - c/w Celexa 20mg daily  - Lacks capacity, cannot leave AMA  - Dispo: inpt [facility] as this level of care is not controlling his behaviors, 2PC complete

## 2024-03-02 PROCEDURE — 99232 SBSQ HOSP IP/OBS MODERATE 35: CPT

## 2024-03-02 RX ADMIN — DIVALPROEX SODIUM 250 MILLIGRAM(S): 500 TABLET, DELAYED RELEASE ORAL at 17:42

## 2024-03-02 RX ADMIN — ENOXAPARIN SODIUM 40 MILLIGRAM(S): 100 INJECTION SUBCUTANEOUS at 11:38

## 2024-03-02 RX ADMIN — SENNA PLUS 2 TABLET(S): 8.6 TABLET ORAL at 21:20

## 2024-03-02 RX ADMIN — Medication 2 DROP(S): at 11:38

## 2024-03-02 RX ADMIN — AMLODIPINE BESYLATE 5 MILLIGRAM(S): 2.5 TABLET ORAL at 05:49

## 2024-03-02 RX ADMIN — Medication 2 DROP(S): at 17:42

## 2024-03-02 RX ADMIN — CHLORHEXIDINE GLUCONATE 1 APPLICATION(S): 213 SOLUTION TOPICAL at 11:42

## 2024-03-02 RX ADMIN — Medication 1 APPLICATION(S): at 21:21

## 2024-03-02 RX ADMIN — Medication 1 MILLIGRAM(S): at 11:38

## 2024-03-02 RX ADMIN — MEMANTINE HYDROCHLORIDE 5 MILLIGRAM(S): 10 TABLET ORAL at 11:38

## 2024-03-02 RX ADMIN — CITALOPRAM 20 MILLIGRAM(S): 10 TABLET, FILM COATED ORAL at 11:39

## 2024-03-02 RX ADMIN — Medication 2 DROP(S): at 05:50

## 2024-03-02 RX ADMIN — DIVALPROEX SODIUM 250 MILLIGRAM(S): 500 TABLET, DELAYED RELEASE ORAL at 05:50

## 2024-03-02 RX ADMIN — QUETIAPINE FUMARATE 100 MILLIGRAM(S): 200 TABLET, FILM COATED ORAL at 17:42

## 2024-03-02 RX ADMIN — Medication 3 MILLIGRAM(S): at 21:20

## 2024-03-02 RX ADMIN — Medication 2 DROP(S): at 23:17

## 2024-03-03 PROCEDURE — 99232 SBSQ HOSP IP/OBS MODERATE 35: CPT

## 2024-03-03 RX ADMIN — Medication 1 APPLICATION(S): at 21:30

## 2024-03-03 RX ADMIN — CITALOPRAM 20 MILLIGRAM(S): 10 TABLET, FILM COATED ORAL at 11:00

## 2024-03-03 RX ADMIN — DIVALPROEX SODIUM 250 MILLIGRAM(S): 500 TABLET, DELAYED RELEASE ORAL at 05:43

## 2024-03-03 RX ADMIN — Medication 2 DROP(S): at 23:00

## 2024-03-03 RX ADMIN — QUETIAPINE FUMARATE 100 MILLIGRAM(S): 200 TABLET, FILM COATED ORAL at 17:12

## 2024-03-03 RX ADMIN — Medication 2 DROP(S): at 17:12

## 2024-03-03 RX ADMIN — Medication 2 DROP(S): at 11:00

## 2024-03-03 RX ADMIN — SENNA PLUS 2 TABLET(S): 8.6 TABLET ORAL at 21:26

## 2024-03-03 RX ADMIN — ENOXAPARIN SODIUM 40 MILLIGRAM(S): 100 INJECTION SUBCUTANEOUS at 11:01

## 2024-03-03 RX ADMIN — CHLORHEXIDINE GLUCONATE 1 APPLICATION(S): 213 SOLUTION TOPICAL at 11:02

## 2024-03-03 RX ADMIN — Medication 2 DROP(S): at 05:43

## 2024-03-03 RX ADMIN — Medication 1 MILLIGRAM(S): at 11:00

## 2024-03-03 RX ADMIN — Medication 3 MILLIGRAM(S): at 21:26

## 2024-03-03 RX ADMIN — AMLODIPINE BESYLATE 5 MILLIGRAM(S): 2.5 TABLET ORAL at 05:43

## 2024-03-03 RX ADMIN — DIVALPROEX SODIUM 250 MILLIGRAM(S): 500 TABLET, DELAYED RELEASE ORAL at 17:12

## 2024-03-03 RX ADMIN — MEMANTINE HYDROCHLORIDE 5 MILLIGRAM(S): 10 TABLET ORAL at 11:00

## 2024-03-04 PROCEDURE — 99232 SBSQ HOSP IP/OBS MODERATE 35: CPT

## 2024-03-04 RX ORDER — DIVALPROEX SODIUM 500 MG/1
250 TABLET, DELAYED RELEASE ORAL THREE TIMES A DAY
Refills: 0 | Status: DISCONTINUED | OUTPATIENT
Start: 2024-03-04 | End: 2024-03-07

## 2024-03-04 RX ADMIN — Medication 1 APPLICATION(S): at 21:09

## 2024-03-04 RX ADMIN — SENNA PLUS 2 TABLET(S): 8.6 TABLET ORAL at 21:08

## 2024-03-04 RX ADMIN — QUETIAPINE FUMARATE 100 MILLIGRAM(S): 200 TABLET, FILM COATED ORAL at 17:56

## 2024-03-04 RX ADMIN — DIVALPROEX SODIUM 250 MILLIGRAM(S): 500 TABLET, DELAYED RELEASE ORAL at 21:08

## 2024-03-04 RX ADMIN — Medication 2 DROP(S): at 12:31

## 2024-03-04 RX ADMIN — MEMANTINE HYDROCHLORIDE 5 MILLIGRAM(S): 10 TABLET ORAL at 12:30

## 2024-03-04 RX ADMIN — ENOXAPARIN SODIUM 40 MILLIGRAM(S): 100 INJECTION SUBCUTANEOUS at 12:31

## 2024-03-04 RX ADMIN — CITALOPRAM 20 MILLIGRAM(S): 10 TABLET, FILM COATED ORAL at 12:31

## 2024-03-04 RX ADMIN — DIVALPROEX SODIUM 250 MILLIGRAM(S): 500 TABLET, DELAYED RELEASE ORAL at 05:34

## 2024-03-04 RX ADMIN — Medication 1 MILLIGRAM(S): at 12:31

## 2024-03-04 RX ADMIN — CHLORHEXIDINE GLUCONATE 1 APPLICATION(S): 213 SOLUTION TOPICAL at 12:31

## 2024-03-04 RX ADMIN — Medication 2 DROP(S): at 21:09

## 2024-03-04 RX ADMIN — Medication 2 DROP(S): at 17:56

## 2024-03-04 RX ADMIN — AMLODIPINE BESYLATE 5 MILLIGRAM(S): 2.5 TABLET ORAL at 05:34

## 2024-03-04 RX ADMIN — Medication 3 MILLIGRAM(S): at 21:08

## 2024-03-04 RX ADMIN — Medication 2 DROP(S): at 05:34

## 2024-03-04 NOTE — BH CONSULTATION LIAISON PROGRESS NOTE - NSBHFUPINTERVALHXFT_PSY_A_CORE
Chart reviewed. Discussed in IDRs. Had a good weekend, no PRNs. However this AM was AA O x 1, agitated, slapping his knees in disorganized/purposeless manner, disinhibited/impulsive. Difficult to interrupt. Euphoric. No insight.

## 2024-03-04 NOTE — BH CONSULTATION LIAISON PROGRESS NOTE - NSBHASSESSMENTFT_PSY_ALL_CORE
84 yo male with pmhx of Dementia (?Alzheimer's), cataracts s/p cataract surgery (on 1/20/24), hypertension, and xerotic dermatitis, p/w agitation for the past 2 days with 1 week of insomnia. Psych consulted for agitation. Pt presents w/ dementia hx now worsened by recent insomnia, unclear precipitants. Warrants medical as well as psychiatric optimization.     2/21- In behavorial control. No PRNs required for agitation overnight.  Compliant with standing medication.  2/22- Received Zyprexa PRN overnight, but in behavorial control on exam. no SIIP  2/23-No PRNs overnight, remains w/ intermittent periods of agitation. no SIIP  2/26: continued PRNs over weekend, not sleeping at night. No SIIP though poor insight/impulse control  2/27: PRN overnight with clear sundowning pattern. Remains disorganized without insight.  2/28: no PRN overnight, though up at night. Tolerating medication. Remains disorganized without insight.  2/29-3/1: no PRN overnight, agitated/sexually inappropriate today, impulsive  3/4: sexually inappropriate/impulsive/disinhibited.     Plan:   - Observation per 7S team; can utilize [facility] aid if/when available  - C/W Seroquel 100mg PO Q 7PM to prevent sundowning/agitation  - INCREASE Depakote to 250mg TID standing.  - C/W Melatonin, Memantine  - DISCONTINUE Celexa 20mg daily  - Lacks capacity, cannot leave AMA  - Dispo: inpt [facility] as this level of care is not controlling his behaviors, 2PC complete

## 2024-03-05 PROCEDURE — 99232 SBSQ HOSP IP/OBS MODERATE 35: CPT

## 2024-03-05 RX ADMIN — ENOXAPARIN SODIUM 40 MILLIGRAM(S): 100 INJECTION SUBCUTANEOUS at 12:47

## 2024-03-05 RX ADMIN — Medication 1 MILLIGRAM(S): at 12:46

## 2024-03-05 RX ADMIN — SENNA PLUS 2 TABLET(S): 8.6 TABLET ORAL at 21:51

## 2024-03-05 RX ADMIN — CHLORHEXIDINE GLUCONATE 1 APPLICATION(S): 213 SOLUTION TOPICAL at 12:48

## 2024-03-05 RX ADMIN — Medication 1 APPLICATION(S): at 21:52

## 2024-03-05 RX ADMIN — Medication 2 DROP(S): at 17:12

## 2024-03-05 RX ADMIN — DIVALPROEX SODIUM 250 MILLIGRAM(S): 500 TABLET, DELAYED RELEASE ORAL at 12:48

## 2024-03-05 RX ADMIN — Medication 2 DROP(S): at 06:14

## 2024-03-05 RX ADMIN — QUETIAPINE FUMARATE 100 MILLIGRAM(S): 200 TABLET, FILM COATED ORAL at 17:11

## 2024-03-05 RX ADMIN — AMLODIPINE BESYLATE 5 MILLIGRAM(S): 2.5 TABLET ORAL at 06:14

## 2024-03-05 RX ADMIN — MEMANTINE HYDROCHLORIDE 5 MILLIGRAM(S): 10 TABLET ORAL at 12:46

## 2024-03-05 RX ADMIN — Medication 3 MILLIGRAM(S): at 21:52

## 2024-03-05 RX ADMIN — Medication 2 DROP(S): at 21:52

## 2024-03-05 RX ADMIN — DIVALPROEX SODIUM 250 MILLIGRAM(S): 500 TABLET, DELAYED RELEASE ORAL at 21:51

## 2024-03-05 RX ADMIN — DIVALPROEX SODIUM 250 MILLIGRAM(S): 500 TABLET, DELAYED RELEASE ORAL at 06:14

## 2024-03-05 RX ADMIN — Medication 2 DROP(S): at 12:47

## 2024-03-05 NOTE — BH CONSULTATION LIAISON PROGRESS NOTE - NSBHASSESSMENTFT_PSY_ALL_CORE
84 yo male with pmhx of Dementia (?Alzheimer's), cataracts s/p cataract surgery (on 1/20/24), hypertension, and xerotic dermatitis, p/w agitation for the past 2 days with 1 week of insomnia. Psych consulted for agitation. Pt presents w/ dementia hx now worsened by recent insomnia, unclear precipitants. Warrants medical as well as psychiatric optimization.     2/21- In behavorial control. No PRNs required for agitation overnight.  Compliant with standing medication.  2/22- Received Zyprexa PRN overnight, but in behavorial control on exam. no SIIP  2/23-No PRNs overnight, remains w/ intermittent periods of agitation. no SIIP  2/26: continued PRNs over weekend, not sleeping at night. No SIIP though poor insight/impulse control  2/27: PRN overnight with clear sundowning pattern. Remains disorganized without insight.  2/28: no PRN overnight, though up at night. Tolerating medication. Remains disorganized without insight.  2/29-3/1: no PRN overnight, agitated/sexually inappropriate today, impulsive  3/4: sexually inappropriate/impulsive/disinhibited.   3/5: calmer though disorganized, in NAD but pleasantly confused/disoriented/disorganized    Plan:   - Observation per 7S team; can utilize care home aid if/when available  - C/W Seroquel 100mg PO Q 7PM to prevent sundowning/agitation  - C/W Depakote 250mg TID standing.  - C/W Melatonin, Memantine  - Lacks capacity, cannot leave AMA  - Dispo: inpt [facility] as this level of care is not controlling his behaviors, 2PC complete

## 2024-03-05 NOTE — BH CONSULTATION LIAISON PROGRESS NOTE - NSBHFUPINTERVALHXFT_PSY_A_CORE
Chart reviewed. No PRNs overnight. On exam is pleasantly confused/disoriented, disorganized, euphoric. Not as restless this AM. No insight. No focal neuro deficits.

## 2024-03-06 PROCEDURE — 99232 SBSQ HOSP IP/OBS MODERATE 35: CPT

## 2024-03-06 RX ADMIN — Medication 2 DROP(S): at 18:30

## 2024-03-06 RX ADMIN — Medication 1 APPLICATION(S): at 05:30

## 2024-03-06 RX ADMIN — MEMANTINE HYDROCHLORIDE 5 MILLIGRAM(S): 10 TABLET ORAL at 12:49

## 2024-03-06 RX ADMIN — Medication 1 MILLIGRAM(S): at 12:48

## 2024-03-06 RX ADMIN — DIVALPROEX SODIUM 250 MILLIGRAM(S): 500 TABLET, DELAYED RELEASE ORAL at 12:48

## 2024-03-06 RX ADMIN — Medication 1 APPLICATION(S): at 22:01

## 2024-03-06 RX ADMIN — Medication 2 DROP(S): at 22:01

## 2024-03-06 RX ADMIN — Medication 2 DROP(S): at 12:48

## 2024-03-06 RX ADMIN — DIVALPROEX SODIUM 250 MILLIGRAM(S): 500 TABLET, DELAYED RELEASE ORAL at 22:00

## 2024-03-06 RX ADMIN — QUETIAPINE FUMARATE 100 MILLIGRAM(S): 200 TABLET, FILM COATED ORAL at 18:35

## 2024-03-06 RX ADMIN — AMLODIPINE BESYLATE 5 MILLIGRAM(S): 2.5 TABLET ORAL at 05:30

## 2024-03-06 RX ADMIN — Medication 2 DROP(S): at 05:30

## 2024-03-06 RX ADMIN — Medication 3 MILLIGRAM(S): at 22:00

## 2024-03-06 RX ADMIN — DIVALPROEX SODIUM 250 MILLIGRAM(S): 500 TABLET, DELAYED RELEASE ORAL at 05:30

## 2024-03-06 RX ADMIN — ENOXAPARIN SODIUM 40 MILLIGRAM(S): 100 INJECTION SUBCUTANEOUS at 12:48

## 2024-03-06 RX ADMIN — SENNA PLUS 2 TABLET(S): 8.6 TABLET ORAL at 22:00

## 2024-03-06 RX ADMIN — CHLORHEXIDINE GLUCONATE 1 APPLICATION(S): 213 SOLUTION TOPICAL at 12:47

## 2024-03-07 VITALS
DIASTOLIC BLOOD PRESSURE: 60 MMHG | RESPIRATION RATE: 18 BRPM | TEMPERATURE: 98 F | HEART RATE: 66 BPM | OXYGEN SATURATION: 99 % | SYSTOLIC BLOOD PRESSURE: 136 MMHG

## 2024-03-07 PROCEDURE — 99232 SBSQ HOSP IP/OBS MODERATE 35: CPT

## 2024-03-07 PROCEDURE — 99238 HOSP IP/OBS DSCHRG MGMT 30/<: CPT

## 2024-03-07 RX ORDER — AMLODIPINE BESYLATE 2.5 MG/1
1 TABLET ORAL
Qty: 30 | Refills: 0
Start: 2024-03-07 | End: 2024-04-05

## 2024-03-07 RX ORDER — QUETIAPINE FUMARATE 200 MG/1
1 TABLET, FILM COATED ORAL
Qty: 30 | Refills: 0
Start: 2024-03-07 | End: 2024-04-05

## 2024-03-07 RX ORDER — PETROLATUM,WHITE
1 JELLY (GRAM) TOPICAL
Qty: 1 | Refills: 0
Start: 2024-03-07 | End: 2024-03-16

## 2024-03-07 RX ORDER — QUETIAPINE FUMARATE 200 MG/1
1 TABLET, FILM COATED ORAL
Qty: 0 | Refills: 0 | DISCHARGE
Start: 2024-03-07

## 2024-03-07 RX ORDER — DIVALPROEX SODIUM 500 MG/1
1 TABLET, DELAYED RELEASE ORAL
Qty: 90 | Refills: 0
Start: 2024-03-07 | End: 2024-04-05

## 2024-03-07 RX ORDER — PETROLATUM,WHITE
1 JELLY (GRAM) TOPICAL
Qty: 0 | Refills: 0 | DISCHARGE
Start: 2024-03-07

## 2024-03-07 RX ORDER — PREDNISOLONE SODIUM PHOSPHATE 1 %
1 DROPS OPHTHALMIC (EYE)
Refills: 0 | DISCHARGE

## 2024-03-07 RX ORDER — CHLORHEXIDINE GLUCONATE 213 G/1000ML
1 SOLUTION TOPICAL DAILY
Refills: 0 | Status: DISCONTINUED | OUTPATIENT
Start: 2024-03-07 | End: 2024-03-07

## 2024-03-07 RX ORDER — QUETIAPINE FUMARATE 200 MG/1
1 TABLET, FILM COATED ORAL
Refills: 0 | DISCHARGE

## 2024-03-07 RX ORDER — DIVALPROEX SODIUM 500 MG/1
1 TABLET, DELAYED RELEASE ORAL
Qty: 0 | Refills: 0 | DISCHARGE
Start: 2024-03-07

## 2024-03-07 RX ORDER — ENOXAPARIN SODIUM 100 MG/ML
40 INJECTION SUBCUTANEOUS EVERY 24 HOURS
Refills: 0 | Status: DISCONTINUED | OUTPATIENT
Start: 2024-03-07 | End: 2024-03-07

## 2024-03-07 RX ORDER — AMLODIPINE BESYLATE 2.5 MG/1
1 TABLET ORAL
Qty: 0 | Refills: 0 | DISCHARGE
Start: 2024-03-07

## 2024-03-07 RX ADMIN — QUETIAPINE FUMARATE 100 MILLIGRAM(S): 200 TABLET, FILM COATED ORAL at 16:56

## 2024-03-07 RX ADMIN — Medication 1 MILLIGRAM(S): at 12:39

## 2024-03-07 RX ADMIN — ENOXAPARIN SODIUM 40 MILLIGRAM(S): 100 INJECTION SUBCUTANEOUS at 12:31

## 2024-03-07 RX ADMIN — Medication 2 DROP(S): at 16:56

## 2024-03-07 RX ADMIN — AMLODIPINE BESYLATE 5 MILLIGRAM(S): 2.5 TABLET ORAL at 05:45

## 2024-03-07 RX ADMIN — DIVALPROEX SODIUM 250 MILLIGRAM(S): 500 TABLET, DELAYED RELEASE ORAL at 05:45

## 2024-03-07 RX ADMIN — DIVALPROEX SODIUM 250 MILLIGRAM(S): 500 TABLET, DELAYED RELEASE ORAL at 12:38

## 2024-03-07 RX ADMIN — CHLORHEXIDINE GLUCONATE 1 APPLICATION(S): 213 SOLUTION TOPICAL at 12:38

## 2024-03-07 RX ADMIN — Medication 2 DROP(S): at 05:45

## 2024-03-07 RX ADMIN — MEMANTINE HYDROCHLORIDE 5 MILLIGRAM(S): 10 TABLET ORAL at 12:39

## 2024-03-07 RX ADMIN — Medication 2 DROP(S): at 12:38

## 2024-03-07 NOTE — PROGRESS NOTE ADULT - PROBLEM SELECTOR PROBLEM 3
Insomnia

## 2024-03-07 NOTE — PROGRESS NOTE ADULT - SUBJECTIVE AND OBJECTIVE BOX
Patient is a 83y old  Male who presents with a chief complaint of Acute encephalopathy, Agitation (05 Mar 2024 14:35)    SUBJECTIVE / OVERNIGHT EVENTS: No acute events. Calm. Denies discomfort.     MEDICATIONS  (STANDING):  amLODIPine   Tablet 5 milliGRAM(s) Oral daily  artificial tears (preservative free) Ophthalmic Solution 2 Drop(s) Left EYE four times a day  chlorhexidine 2% Cloths 1 Application(s) Topical daily  divalproex  milliGRAM(s) Oral three times a day  enoxaparin Injectable 40 milliGRAM(s) SubCutaneous every 24 hours  folic acid 1 milliGRAM(s) Oral daily  melatonin 3 milliGRAM(s) Oral at bedtime  memantine 5 milliGRAM(s) Oral daily  petrolatum Ophthalmic Ointment 1 Application(s) Left EYE at bedtime  QUEtiapine 100 milliGRAM(s) Oral <User Schedule>  senna 2 Tablet(s) Oral at bedtime    MEDICATIONS  (PRN):  AQUAPHOR (petrolatum Ointment) 1 Application(s) Topical four times a day PRN Chapped Lips    CAPILLARY BLOOD GLUCOSE    I&O's Summary    05 Mar 2024 07:01  -  06 Mar 2024 07:00  --------------------------------------------------------  IN: 100 mL / OUT: 400 mL / NET: -300 mL    PHYSICAL EXAM:  Vital Signs Last 24 Hrs  T(C): 36.6 (06 Mar 2024 11:55), Max: 36.9 (05 Mar 2024 20:39)  T(F): 97.8 (06 Mar 2024 11:55), Max: 98.4 (05 Mar 2024 20:39)  HR: 66 (06 Mar 2024 11:55) (56 - 79)  BP: 131/63 (06 Mar 2024 11:55) (131/63 - 144/79)  BP(mean): --  RR: 18 (06 Mar 2024 11:55) (18 - 18)  SpO2: 99% (06 Mar 2024 11:55) (99% - 100%)    Parameters below as of 06 Mar 2024 11:55  Patient On (Oxygen Delivery Method): room air    CONSTITUTIONAL: NAD, laying in bed  EYES: conjunctiva and sclera clear  ENMT: Moist oral mucosa  RESPIRATORY: Normal respiratory effort; lungs are clear to auscultation bilaterally  CARDIOVASCULAR: Regular rate and rhythm, normal S1 and S2, No lower extremity edema  ABDOMEN: Nontender to palpation, normoactive bowel sounds, no rebound/guarding  PSYCH: calm    LABS, RADIOLOGY & ADDITIONAL TESTS: Reviewed    COORDINATION OF CARE:  Care Discussed with Consultants/Other Providers [Y- Psychiatry attending, Medicine ACP, CM/SW]  
Patient is a 83y old  Male who presents with a chief complaint of Acute encephalopathy, Agitation (06 Mar 2024 14:29)    SUBJECTIVE / OVERNIGHT EVENTS: No acute events. Comfortable, no complaints.     MEDICATIONS  (STANDING):  amLODIPine   Tablet 5 milliGRAM(s) Oral daily  artificial tears (preservative free) Ophthalmic Solution 2 Drop(s) Left EYE four times a day  chlorhexidine 2% Cloths 1 Application(s) Topical daily  divalproex  milliGRAM(s) Oral three times a day  enoxaparin Injectable 40 milliGRAM(s) SubCutaneous every 24 hours  folic acid 1 milliGRAM(s) Oral daily  melatonin 3 milliGRAM(s) Oral at bedtime  memantine 5 milliGRAM(s) Oral daily  petrolatum Ophthalmic Ointment 1 Application(s) Left EYE at bedtime  QUEtiapine 100 milliGRAM(s) Oral <User Schedule>  senna 2 Tablet(s) Oral at bedtime    MEDICATIONS  (PRN):  AQUAPHOR (petrolatum Ointment) 1 Application(s) Topical four times a day PRN Chapped Lips    CAPILLARY BLOOD GLUCOSE    I&O's Summary    06 Mar 2024 07:01  -  07 Mar 2024 07:00  --------------------------------------------------------  IN: 530 mL / OUT: 300 mL / NET: 230 mL    PHYSICAL EXAM:  Vital Signs Last 24 Hrs  T(C): 36.5 (07 Mar 2024 11:15), Max: 36.9 (06 Mar 2024 20:22)  T(F): 97.7 (07 Mar 2024 11:15), Max: 98.4 (06 Mar 2024 20:22)  HR: 66 (07 Mar 2024 11:15) (66 - 66)  BP: 136/60 (07 Mar 2024 11:15) (132/65 - 144/65)  BP(mean): --  RR: 18 (07 Mar 2024 11:15) (18 - 18)  SpO2: 99% (07 Mar 2024 11:15) (99% - 100%)    Parameters below as of 07 Mar 2024 11:15  Patient On (Oxygen Delivery Method): room air    CONSTITUTIONAL: NAD, laying in bed  EYES: conjunctiva and sclera clear  ENMT: Moist oral mucosa  RESPIRATORY: Normal respiratory effort; lungs are clear to auscultation bilaterally  CARDIOVASCULAR: Regular rate and rhythm, normal S1 and S2, No extremity edema  ABDOMEN: Nontender to palpation, normoactive bowel sounds, no rebound/guarding  PSYCH: calm    LABS, RADIOLOGY & ADDITIONAL TESTS: Reviewed    COORDINATION OF CARE:  Care Discussed with Consultants/Other Providers [Y- Psychiatry attending, Medicine ACP, CM/SW]    
[facility] DEPARTMENT OF OPHTHALMOLOGY  ------------------------------------------------------------------------------  Roberto Carlos Reece MD PGY-3  ------------------------------------------------------------------------------    Interval History: No acute events overnight. Today patient denying blurred vision, eye pain, flashes, floaters, FBS, erythema, or discharge.     MEDICATIONS  (STANDING):  amLODIPine   Tablet 5 milliGRAM(s) Oral daily  artificial tears (preservative free) Ophthalmic Solution 2 Drop(s) Left EYE four times a day  chlorhexidine 2% Cloths 1 Application(s) Topical daily  citalopram 20 milliGRAM(s) Oral daily  enoxaparin Injectable 40 milliGRAM(s) SubCutaneous every 24 hours  folic acid 1 milliGRAM(s) Oral daily  melatonin 3 milliGRAM(s) Oral at bedtime  memantine 5 milliGRAM(s) Oral daily  petrolatum Ophthalmic Ointment 1 Application(s) Left EYE at bedtime  prednisoLONE acetate 1% Suspension 1 Drop(s) Left EYE daily  QUEtiapine 50 milliGRAM(s) Oral at bedtime  senna 2 Tablet(s) Oral at bedtime    MEDICATIONS  (PRN):  AQUAPHOR (petrolatum Ointment) 1 Application(s) Topical four times a day PRN Chapped Lips      VITALS: T(C): 36.3 (02-21-24 @ 05:00)  T(F): 97.3 (02-21-24 @ 05:00), Max: 97.6 (02-20-24 @ 20:36)  HR: 61 (02-21-24 @ 05:00) (61 - 84)  BP: 153/87 (02-21-24 @ 05:00) (121/59 - 153/87)  RR:  (17 - 18)  SpO2:  (99% - 100%)  Wt(kg): --    General: AAO x 1-2, appropriate mood and affect    Ophthalmology Exam:  Visual acuity (cc): 20/20 OD, 20/20 OS  Pupils: PERRL OU, no APD  Ttono: 20 OD, 17 OS  Extraocular movements (EOMs): Full OU, no pain, no diplopia  Confrontational Visual Field (CVF): KATINA 2/2 poor cooperation    Pen Light Exam (PLE)  External: Flat OU  Lids/Lashes/Lacrimal Ducts: Flat OU    Sclera/Conjunctiva: W+Q OD, nasal subconj heme OS  Cornea: Arcus OU  Anterior Chamber: D+F OU    Iris: Flat OU  Lens: IOL OU    Fundus Exam: dilated with 1% tropicamide and 2.5% phenylephrine  Approval obtained from Radhika Soto at 4:50pm for dilation  Patient aware that pupils can remained dilated for at least 4-6 hours  Exam performed with 20D lens    Vitreous: PVD OU  Disc, cup/disc: PPA, 0.2 OU  Macula: wnl OU  Vessels: wnl OU  Periphery: Pigmentary changes OD, wnl OS    Labs/Imaging:  ***            
[facility] Division of Hospital Medicine  Jeni Ross MD  Available via MS Teams  Pager: [phone]    SUBJECTIVE / OVERNIGHT EVENTS:    no events. pt denies complaints. Aid at bedside, who report pt still not at baseline as agitated this am    MEDICATIONS  (STANDING):  amLODIPine   Tablet 5 milliGRAM(s) Oral daily  artificial tears (preservative free) Ophthalmic Solution 2 Drop(s) Left EYE four times a day  chlorhexidine 2% Cloths 1 Application(s) Topical daily  citalopram 20 milliGRAM(s) Oral daily  enoxaparin Injectable 40 milliGRAM(s) SubCutaneous every 24 hours  folic acid 1 milliGRAM(s) Oral daily  melatonin 3 milliGRAM(s) Oral at bedtime  memantine 5 milliGRAM(s) Oral daily  petrolatum Ophthalmic Ointment 1 Application(s) Left EYE at bedtime  prednisoLONE acetate 1% Suspension 1 Drop(s) Left EYE daily  QUEtiapine 50 milliGRAM(s) Oral at bedtime  senna 2 Tablet(s) Oral at bedtime  sodium chloride 0.9%. 500 milliLiter(s) (500 mL/Hr) IV Continuous <Continuous>    MEDICATIONS  (PRN):  AQUAPHOR (petrolatum Ointment) 1 Application(s) Topical four times a day PRN Chapped Lips      I&O's Summary      PHYSICAL EXAM:  Vital Signs Last 24 Hrs  T(C): 36.4 (21 Feb 2024 10:47), Max: 36.4 (20 Feb 2024 20:36)  T(F): 97.6 (21 Feb 2024 10:47), Max: 97.6 (20 Feb 2024 20:36)  HR: 66 (21 Feb 2024 10:47) (61 - 84)  BP: 102/73 (21 Feb 2024 10:47) (102/73 - 153/87)  BP(mean): --  RR: 18 (21 Feb 2024 10:47) (17 - 18)  SpO2: 99% (21 Feb 2024 10:47) (99% - 100%)    Parameters below as of 21 Feb 2024 10:47  Patient On (Oxygen Delivery Method): room air      CONSTITUTIONAL: NAD  EYES: PERRLA; conjunctiva and sclera clear  ENMT: Moist oral mucosa, no pharyngeal injection or exudates  NECK: Supple, no palpable masses  RESPIRATORY: Normal respiratory effort; lungs are clear to auscultation bilaterally  CARDIOVASCULAR: Regular rate and rhythm, normal S1 and S2, no murmur/rub/gallop; No lower extremity edema; Peripheral pulses are 2+ bilaterally  ABDOMEN: Nontender to palpation, normoactive bowel sounds, no rebound/guarding  MUSCULOSKELETAL:  no clubbing or cyanosis of digits; no joint swelling or tenderness to palpation  PSYCH: A+O to person only  NEUROLOGY: CN 2-12 are intact and symmetric; no gross sensory deficits   SKIN: No rashes; no palpable lesions    LABS:                        13.4   5.87  )-----------( 182      ( 21 Feb 2024 05:00 )             41.5     02-21    143  |  109<H>  |  16  ----------------------------<  84  4.2   |  25  |  1.02    Ca    9.0      21 Feb 2024 05:00  Phos  3.2     02-19  Mg     1.90     02-19    TPro  6.0  /  Alb  3.3  /  TBili  0.4  /  DBili  x   /  AST  23  /  ALT  14  /  AlkPhos  86  02-21          Urinalysis Basic - ( 21 Feb 2024 05:00 )    Color: x / Appearance: x / SG: x / pH: x  Gluc: 84 mg/dL / Ketone: x  / Bili: x / Urobili: x   Blood: x / Protein: x / Nitrite: x   Leuk Esterase: x / RBC: x / WBC x   Sq Epi: x / Non Sq Epi: x / Bacteria: x        Culture - Urine (collected 18 Feb 2024 19:30)  Source: Clean Catch Clean Catch (Midstream)  Final Report (19 Feb 2024 21:55):    <10,000 CFU/mL Normal Urogenital Charissa                
[facility] Division of Hospital Medicine  Jeni Ross MD  Available via MS Teams  Pager: [phone]    SUBJECTIVE / OVERNIGHT EVENTS:  no events. pt calm, cooperative  aid at bedside, who states patient closer to his baseline, improving    MEDICATIONS  (STANDING):  amLODIPine   Tablet 5 milliGRAM(s) Oral daily  artificial tears (preservative free) Ophthalmic Solution 2 Drop(s) Left EYE four times a day  chlorhexidine 2% Cloths 1 Application(s) Topical daily  citalopram 20 milliGRAM(s) Oral daily  enoxaparin Injectable 40 milliGRAM(s) SubCutaneous every 24 hours  folic acid 1 milliGRAM(s) Oral daily  melatonin 3 milliGRAM(s) Oral at bedtime  memantine 5 milliGRAM(s) Oral daily  petrolatum Ophthalmic Ointment 1 Application(s) Left EYE at bedtime  QUEtiapine 75 milliGRAM(s) Oral <User Schedule>  senna 2 Tablet(s) Oral at bedtime    MEDICATIONS  (PRN):  AQUAPHOR (petrolatum Ointment) 1 Application(s) Topical four times a day PRN Chapped Lips      I&O's Summary    22 Feb 2024 07:01  -  23 Feb 2024 07:00  --------------------------------------------------------  IN: 350 mL / OUT: 800 mL / NET: -450 mL        PHYSICAL EXAM:  Vital Signs Last 24 Hrs  T(C): 36.4 (23 Feb 2024 04:09), Max: 36.8 (22 Feb 2024 20:45)  T(F): 97.5 (23 Feb 2024 04:09), Max: 98.2 (22 Feb 2024 20:45)  HR: 65 (23 Feb 2024 04:09) (65 - 82)  BP: 156/90 (23 Feb 2024 04:09) (140/82 - 156/90)  BP(mean): --  RR: 18 (23 Feb 2024 04:09) (17 - 18)  SpO2: 98% (23 Feb 2024 04:09) (98% - 98%)    Parameters below as of 23 Feb 2024 04:09  Patient On (Oxygen Delivery Method): room air      CONSTITUTIONAL: NAD  EYES: PERRLA; conjunctiva and sclera clear  ENMT: Moist oral mucosa, no pharyngeal injection or exudates  NECK: Supple, no palpable masses  RESPIRATORY: Normal respiratory effort; lungs are clear to auscultation bilaterally  CARDIOVASCULAR: Regular rate and rhythm, normal S1 and S2, no murmur/rub/gallop; No lower extremity edema; Peripheral pulses are 2+ bilaterally  ABDOMEN: Nontender to palpation, normoactive bowel sounds, no rebound/guarding  MUSCULOSKELETAL:  no clubbing or cyanosis of digits; no joint swelling or tenderness to palpation  NEUROLOGY: CN 2-12 are intact and symmetric; no gross sensory deficits   SKIN: No rashes; no palpable lesions    LABS:                        13.7   7.46  )-----------( 191      ( 23 Feb 2024 05:30 )             42.0     02-23    141  |  106  |  13  ----------------------------<  75  4.3   |  25  |  0.96    Ca    9.0      23 Feb 2024 05:30  Phos  3.5     02-23  Mg     2.00     02-23    TPro  6.5  /  Alb  3.4  /  TBili  0.3  /  DBili  x   /  AST  21  /  ALT  10  /  AlkPhos  89  02-23          Urinalysis Basic - ( 23 Feb 2024 05:30 )    Color: x / Appearance: x / SG: x / pH: x  Gluc: 75 mg/dL / Ketone: x  / Bili: x / Urobili: x   Blood: x / Protein: x / Nitrite: x   Leuk Esterase: x / RBC: x / WBC x   Sq Epi: x / Non Sq Epi: x / Bacteria: x                  
Medicine Progress Note    Patient is a 83y old  Male who presents with a chief complaint of Acute encephalopathy, Agitation (01 Mar 2024 14:30)      SUBJECTIVE / OVERNIGHT EVENTS: no events     ADDITIONAL REVIEW OF SYSTEMS: negative     MEDICATIONS  (STANDING):  amLODIPine   Tablet 5 milliGRAM(s) Oral daily  artificial tears (preservative free) Ophthalmic Solution 2 Drop(s) Left EYE four times a day  chlorhexidine 2% Cloths 1 Application(s) Topical daily  citalopram 20 milliGRAM(s) Oral daily  divalproex  milliGRAM(s) Oral two times a day  enoxaparin Injectable 40 milliGRAM(s) SubCutaneous every 24 hours  folic acid 1 milliGRAM(s) Oral daily  melatonin 3 milliGRAM(s) Oral at bedtime  memantine 5 milliGRAM(s) Oral daily  petrolatum Ophthalmic Ointment 1 Application(s) Left EYE at bedtime  QUEtiapine 100 milliGRAM(s) Oral <User Schedule>  senna 2 Tablet(s) Oral at bedtime    MEDICATIONS  (PRN):  AQUAPHOR (petrolatum Ointment) 1 Application(s) Topical four times a day PRN Chapped Lips    CAPILLARY BLOOD GLUCOSE        I&O's Summary    01 Mar 2024 07:01  -  02 Mar 2024 07:00  --------------------------------------------------------  IN: 450 mL / OUT: 565 mL / NET: -115 mL        PHYSICAL EXAM:  Vital Signs Last 24 Hrs  T(C): 36.8 (02 Mar 2024 05:03), Max: 37 (01 Mar 2024 22:10)  T(F): 98.3 (02 Mar 2024 05:03), Max: 98.6 (01 Mar 2024 22:10)  HR: 80 (02 Mar 2024 05:03) (68 - 80)  BP: 147/68 (02 Mar 2024 05:03) (147/68 - 151/71)  BP(mean): --  RR: 18 (02 Mar 2024 05:03) (17 - 18)  SpO2: 100% (02 Mar 2024 05:03) (97% - 100%)    Parameters below as of 02 Mar 2024 05:03  Patient On (Oxygen Delivery Method): room air      CONSTITUTIONAL: NAD,   ENMT: Moist oral mucosa, no pharyngeal injection or exudates;   RESPIRATORY: Normal respiratory effort; lungs are clear to auscultation bilaterally  CARDIOVASCULAR: Regular rate and rhythm, normal S1 and S2, ; No lower extremity edema;   ABDOMEN: Nontender to palpation, normoactive bowel sounds, no rebound/guarding;   PSYCH:  alert , not oriented   NEUROLOGY: grossly non focal   SKIN: No rashes; no palpable lesions    LABS:                    SARS-CoV-2: NotDetec (29 Feb 2024 11:10)  COVID-19 PCR: NotDetec (26 Feb 2024 12:49)      RADIOLOGY & ADDITIONAL TESTS:  Imaging from Last 24 Hours:    Electrocardiogram/QTc Interval:    COORDINATION OF CARE:  Care Discussed with Consultants/Other Providers: ACP   
Medicine Progress Note    Patient is a 83y old  Male who presents with a chief complaint of Acute encephalopathy, Agitation (02 Mar 2024 09:06)      SUBJECTIVE / OVERNIGHT EVENTS:    ADDITIONAL REVIEW OF SYSTEMS:    MEDICATIONS  (STANDING):  amLODIPine   Tablet 5 milliGRAM(s) Oral daily  artificial tears (preservative free) Ophthalmic Solution 2 Drop(s) Left EYE four times a day  chlorhexidine 2% Cloths 1 Application(s) Topical daily  citalopram 20 milliGRAM(s) Oral daily  divalproex  milliGRAM(s) Oral two times a day  enoxaparin Injectable 40 milliGRAM(s) SubCutaneous every 24 hours  folic acid 1 milliGRAM(s) Oral daily  melatonin 3 milliGRAM(s) Oral at bedtime  memantine 5 milliGRAM(s) Oral daily  petrolatum Ophthalmic Ointment 1 Application(s) Left EYE at bedtime  QUEtiapine 100 milliGRAM(s) Oral <User Schedule>  senna 2 Tablet(s) Oral at bedtime    MEDICATIONS  (PRN):  AQUAPHOR (petrolatum Ointment) 1 Application(s) Topical four times a day PRN Chapped Lips    CAPILLARY BLOOD GLUCOSE        I&O's Summary    02 Mar 2024 07:01  -  03 Mar 2024 07:00  --------------------------------------------------------  IN: 450 mL / OUT: 450 mL / NET: 0 mL        PHYSICAL EXAM:  Vital Signs Last 24 Hrs  T(C): 36.8 (03 Mar 2024 04:30), Max: 36.9 (02 Mar 2024 12:04)  T(F): 98.2 (03 Mar 2024 04:30), Max: 98.5 (02 Mar 2024 12:04)  HR: 67 (03 Mar 2024 04:30) (67 - 79)  BP: 130/75 (03 Mar 2024 04:30) (130/75 - 143/77)  BP(mean): --  RR: 17 (03 Mar 2024 04:30) (17 - 18)  SpO2: 100% (03 Mar 2024 04:30) (99% - 100%)    Parameters below as of 03 Mar 2024 04:30  Patient On (Oxygen Delivery Method): room air      CONSTITUTIONAL: NAD,   ENMT: Moist oral mucosa, no pharyngeal injection or exudates;   RESPIRATORY: Normal respiratory effort; lungs are clear to auscultation bilaterally  CARDIOVASCULAR: Regular rate and rhythm, normal S1 and S2, ; No lower extremity edema;  ABDOMEN: Nontender to palpation, normoactive bowel sounds, no rebound/guarding;   PSYCH: A+O to person,self,  name   NEUROLOGY: grossly non focal   SKIN: No rashes; no palpable lesions    LABS:                    SARS-CoV-2: NotDetec (03 Mar 2024 09:23)  SARS-CoV-2: NotDetec (29 Feb 2024 11:10)  COVID-19 PCR: NotDetec (26 Feb 2024 12:49)      RADIOLOGY & ADDITIONAL TESTS:  Imaging from Last 24 Hours:    Electrocardiogram/QTc Interval:    COORDINATION OF CARE:  Care Discussed with Consultants/Other Providers:  
Contact Information:  Radhika Soto II, MD, MPH  Internal Medicine    MICHAEL LEMONS, MRN-[id number]    Patient is a 83y old  Male who presents with a chief complaint of Acute encephalopathy, Agitation (19 Feb 2024 16:51)      OVERNIGHT EVENTS/INTERVAL/SUBJECTIVE: Patient evaluated at bedside, states that he is feeling fine and has no complaints. Per home aide, he has not slept since Friday/per son, has not slept since Saturday. Patient intermittently bursts into laughter at random times during the interview. Otherwise, he denies fever, lightheadedness, dizziness, SOB, CP, numbness, tingling, ABD pain, urinary symptoms, blurry vision.    CONSTITUTIONAL: No weakness. No fatigue. No fever.  HEAD: No head trauma.   EYES: No vision changes.  ENT: No hearing changes or tinnitus. No ear pain. No changes in smell. No nasal congestion or discharge. No sore throat. No voice hoarseness.   NECK: No neck pain or stiffness. No lumps.  RESPIRATORY: No cough. No SOB. No wheezing. No hemoptysis.   CARDIOVASCULAR: No chest pain. No palpitations.   GASTROINTESTINAL: No dysphagia. No ABD pain. No distension. No constipation. No diarrhea. No pain with defecation. No hematemesis. No hematochezia or melena.  BACK: No back pain.  GENITOURINARY: No dysuria. No frequency or urgency. No hesitancy. No incontinence. No urinary retention. No suprapubic pain. No hematuria.  EXTREMITY: No swelling.  MUSCULOSKELETAL: No joint pain or swelling. No fractures. No stiffness.    SKIN: No rashes. No itching. No skin, hair, or nail changes.  NEUROLOGICAL: No weakness or paralysis. No lightheadedness or dizziness. No HA. No numbness or tingling.   PSYCHIATRIC: No depression.       OBJECTIVE:  Vital Signs Last 24 Hrs  T(C): 36.9 (19 Feb 2024 15:24), Max: 37.5 (18 Feb 2024 18:07)  T(F): 98.5 (19 Feb 2024 15:24), Max: 99.5 (18 Feb 2024 18:07)  HR: 83 (19 Feb 2024 15:24) (66 - 87)  BP: 175/85 (19 Feb 2024 15:24) (132/80 - 190/90)  BP(mean): 132 (19 Feb 2024 01:31) (132 - 132)  RR: 18 (19 Feb 2024 15:24) (16 - 20)  SpO2: 98% (19 Feb 2024 15:24) (96% - 100%)    Parameters below as of 19 Feb 2024 15:24  Patient On (Oxygen Delivery Method): room air      I&O's Summary    18 Feb 2024 07:01  -  19 Feb 2024 07:00  --------------------------------------------------------  IN: 0 mL / OUT: 350 mL / NET: -350 mL        MEDICATIONS  (STANDING):  artificial tears (preservative free) Ophthalmic Solution 2 Drop(s) Left EYE four times a day  enoxaparin Injectable 40 milliGRAM(s) SubCutaneous every 24 hours  folic acid 1 milliGRAM(s) Oral daily  melatonin 3 milliGRAM(s) Oral at bedtime  memantine 5 milliGRAM(s) Oral daily  petrolatum Ophthalmic Ointment 1 Application(s) Left EYE at bedtime  prednisoLONE acetate 1% Suspension 1 Drop(s) Left EYE daily  QUEtiapine 25 milliGRAM(s) Oral at bedtime  senna 2 Tablet(s) Oral at bedtime    MEDICATIONS  (PRN):    Allergies    No Known Allergies    Intolerances        CONSTITUTIONAL: No acute distress. Awake and alert.  EYES: +Left eye erythema.  RESPIRATORY: CTAB. No wheezes, rales, or rhonchi. No accessory muscle use. No apparent respiratory distress.  CARDIOVASCULAR: +S1/S2. No audible S3/S4. Regular rate and rhythm. No murmurs, rubs, or gallops. No LE swelling or edema.  GASTROINTESTINAL: Soft, nontender, nondistended. +BS. No rebound or guarding.   MUSCULOSKELETAL: Spontaneous movement in all extremities.  NEUROLOGICAL: CN 2-12 grossly intact. No focal deficits. Sensation intact x 4EXT.   PSYCHIATRIC: Appropriate affect. A&Ox3 (oriented to person, place, and time).                              13.2   8.66  )-----------( 191      ( 18 Feb 2024 18:02 )             40.9       02-19    142  |  108<H>  |  14  ----------------------------<  98  4.4   |  23  |  0.94    Ca    9.2      19 Feb 2024 15:08  Phos  3.2     02-19  Mg     1.90     02-19    TPro  6.9  /  Alb  3.7  /  TBili  0.3  /  DBili  x   /  AST  21  /  ALT  18  /  AlkPhos  106  02-18    CAPILLARY BLOOD GLUCOSE      POCT Blood Glucose.: 104 mg/dL (19 Feb 2024 02:10)  POCT Blood Glucose.: 92 mg/dL (18 Feb 2024 22:16)    LIVER FUNCTIONS - ( 18 Feb 2024 18:02 )  Alb: 3.7 g/dL / Pro: 6.9 g/dL / ALK PHOS: 106 U/L / ALT: 18 U/L / AST: 21 U/L / GGT: x               Urinalysis Basic - ( 19 Feb 2024 15:08 )    Color: x / Appearance: x / SG: x / pH: x  Gluc: 98 mg/dL / Ketone: x  / Bili: x / Urobili: x   Blood: x / Protein: x / Nitrite: x   Leuk Esterase: x / RBC: x / WBC x   Sq Epi: x / Non Sq Epi: x / Bacteria: x            RADIOLOGY AND ADDITIONAL TESTS:    CONSULTANT NOTES REVIEWED:    CARE DISCUSSED WITH THE FOLLOWING CONSULTANTS/PROVIDERS:
Patient is a 83y old  Male who presents with a chief complaint of Acute encephalopathy, Agitation (04 Mar 2024 16:09)    SUBJECTIVE / OVERNIGHT EVENTS: No acute events. Laying in bed. States he feels great. Staff stating patient has been eating well without issue.     MEDICATIONS  (STANDING):  amLODIPine   Tablet 5 milliGRAM(s) Oral daily  artificial tears (preservative free) Ophthalmic Solution 2 Drop(s) Left EYE four times a day  chlorhexidine 2% Cloths 1 Application(s) Topical daily  divalproex  milliGRAM(s) Oral three times a day  enoxaparin Injectable 40 milliGRAM(s) SubCutaneous every 24 hours  folic acid 1 milliGRAM(s) Oral daily  melatonin 3 milliGRAM(s) Oral at bedtime  memantine 5 milliGRAM(s) Oral daily  petrolatum Ophthalmic Ointment 1 Application(s) Left EYE at bedtime  QUEtiapine 100 milliGRAM(s) Oral <User Schedule>  senna 2 Tablet(s) Oral at bedtime    MEDICATIONS  (PRN):  AQUAPHOR (petrolatum Ointment) 1 Application(s) Topical four times a day PRN Chapped Lips      CAPILLARY BLOOD GLUCOSE        I&O's Summary    04 Mar 2024 07:01  -  05 Mar 2024 07:00  --------------------------------------------------------  IN: 100 mL / OUT: 600 mL / NET: -500 mL        PHYSICAL EXAM:  Vital Signs Last 24 Hrs  T(C): 36.1 (05 Mar 2024 11:35), Max: 36.7 (04 Mar 2024 22:03)  T(F): 97 (05 Mar 2024 11:35), Max: 98 (04 Mar 2024 22:03)  HR: 69 (05 Mar 2024 05:50) (62 - 69)  BP: 125/80 (05 Mar 2024 11:35) (120/60 - 130/80)  BP(mean): --  RR: 18 (05 Mar 2024 11:35) (18 - 18)  SpO2: 98% (05 Mar 2024 11:35) (98% - 100%)    Parameters below as of 05 Mar 2024 11:35  Patient On (Oxygen Delivery Method): room air    CONSTITUTIONAL: NAD, laying in bed  EYES: conjunctiva and sclera clear  ENMT: Moist oral mucosa  RESPIRATORY: Normal respiratory effort; lungs are clear to auscultation bilaterally  CARDIOVASCULAR: Regular rate and rhythm, normal S1 and S2, No lower extremity edema  ABDOMEN: Nontender to palpation, normoactive bowel sounds, no rebound/guarding  PSYCH: calm    LABS, RADIOLOGY & ADDITIONAL TESTS: reviewed    COORDINATION OF CARE:  Care Discussed with Consultants/Other Providers [Y- medicine ACP]
[facility] Division of Hospital Medicine  Jeni Ross MD  Available via MS Teams  Pager: [phone]    SUBJECTIVE / OVERNIGHT EVENTS:  pt agitated this am, inappropriate sexual comments to this writer     MEDICATIONS  (STANDING):  amLODIPine   Tablet 5 milliGRAM(s) Oral daily  artificial tears (preservative free) Ophthalmic Solution 2 Drop(s) Left EYE four times a day  chlorhexidine 2% Cloths 1 Application(s) Topical daily  citalopram 20 milliGRAM(s) Oral daily  enoxaparin Injectable 40 milliGRAM(s) SubCutaneous every 24 hours  folic acid 1 milliGRAM(s) Oral daily  melatonin 3 milliGRAM(s) Oral at bedtime  memantine 5 milliGRAM(s) Oral daily  petrolatum Ophthalmic Ointment 1 Application(s) Left EYE at bedtime  QUEtiapine 75 milliGRAM(s) Oral <User Schedule>  senna 2 Tablet(s) Oral at bedtime    MEDICATIONS  (PRN):  AQUAPHOR (petrolatum Ointment) 1 Application(s) Topical four times a day PRN Chapped Lips      I&O's Summary      PHYSICAL EXAM:  Vital Signs Last 24 Hrs  T(C): 36.8 (24 Feb 2024 11:05), Max: 36.8 (24 Feb 2024 11:05)  T(F): 98.2 (24 Feb 2024 11:05), Max: 98.2 (24 Feb 2024 11:05)  HR: 70 (24 Feb 2024 11:05) (68 - 77)  BP: 142/89 (24 Feb 2024 11:05) (142/89 - 173/98)  BP(mean): --  RR: 18 (24 Feb 2024 11:05) (18 - 19)  SpO2: 99% (24 Feb 2024 11:05) (99% - 100%)    Parameters below as of 24 Feb 2024 11:05  Patient On (Oxygen Delivery Method): room air      CONSTITUTIONAL: NAD  EYES: PERRLA; conjunctiva and sclera clear  ENMT: Moist oral mucosa, no pharyngeal injection or exudates  NECK: Supple, no palpable masses  RESPIRATORY: Normal respiratory effort; lungs are clear to auscultation bilaterally  CARDIOVASCULAR: Regular rate and rhythm, normal S1 and S2, no murmur/rub/gallop; No lower extremity edema; Peripheral pulses are 2+ bilaterally  ABDOMEN: Nontender to palpation, normoactive bowel sounds, no rebound/guarding  MUSCULOSKELETAL:  no clubbing or cyanosis of digits; no joint swelling or tenderness to palpation  PSYCH: A+O to person only  NEUROLOGY: CN 2-12 are intact and symmetric; no gross sensory deficits   SKIN: No rashes; no palpable lesions    LABS:                        13.7   7.46  )-----------( 191      ( 23 Feb 2024 05:30 )             42.0     02-23    141  |  106  |  13  ----------------------------<  75  4.3   |  25  |  0.96    Ca    9.0      23 Feb 2024 05:30  Phos  3.5     02-23  Mg     2.00     02-23    TPro  6.5  /  Alb  3.4  /  TBili  0.3  /  DBili  x   /  AST  21  /  ALT  10  /  AlkPhos  89  02-23          Urinalysis Basic - ( 23 Feb 2024 05:30 )    Color: x / Appearance: x / SG: x / pH: x  Gluc: 75 mg/dL / Ketone: x  / Bili: x / Urobili: x   Blood: x / Protein: x / Nitrite: x   Leuk Esterase: x / RBC: x / WBC x   Sq Epi: x / Non Sq Epi: x / Bacteria: x                
Patient is a 83y old  Male who presents with a chief complaint of Acute encephalopathy, Agitation (03 Mar 2024 10:43)    SUBJECTIVE / OVERNIGHT EVENTS: No acute events. Sitting up, calm, answering simple questions. Denies pain or discomfort, reports feeling well.    MEDICATIONS  (STANDING):  amLODIPine   Tablet 5 milliGRAM(s) Oral daily  artificial tears (preservative free) Ophthalmic Solution 2 Drop(s) Left EYE four times a day  chlorhexidine 2% Cloths 1 Application(s) Topical daily  citalopram 20 milliGRAM(s) Oral daily  divalproex  milliGRAM(s) Oral two times a day  enoxaparin Injectable 40 milliGRAM(s) SubCutaneous every 24 hours  folic acid 1 milliGRAM(s) Oral daily  melatonin 3 milliGRAM(s) Oral at bedtime  memantine 5 milliGRAM(s) Oral daily  petrolatum Ophthalmic Ointment 1 Application(s) Left EYE at bedtime  QUEtiapine 100 milliGRAM(s) Oral <User Schedule>  senna 2 Tablet(s) Oral at bedtime    MEDICATIONS  (PRN):  AQUAPHOR (petrolatum Ointment) 1 Application(s) Topical four times a day PRN Chapped Lips      CAPILLARY BLOOD GLUCOSE        I&O's Summary    03 Mar 2024 07:01  -  04 Mar 2024 07:00  --------------------------------------------------------  IN: 300 mL / OUT: 475 mL / NET: -175 mL        PHYSICAL EXAM:  Vital Signs Last 24 Hrs  T(C): 36.7 (04 Mar 2024 11:51), Max: 37.1 (03 Mar 2024 22:14)  T(F): 98.1 (04 Mar 2024 11:51), Max: 98.8 (03 Mar 2024 22:14)  HR: 61 (04 Mar 2024 11:51) (61 - 73)  BP: 123/68 (04 Mar 2024 11:51) (123/68 - 150/70)  BP(mean): --  RR: 16 (04 Mar 2024 11:51) (16 - 18)  SpO2: 100% (04 Mar 2024 11:51) (100% - 100%)    Parameters below as of 04 Mar 2024 05:25  Patient On (Oxygen Delivery Method): room air    CONSTITUTIONAL: NAD, sitting up in bed  EYES: conjunctiva and sclera clear  ENMT: Moist oral mucosa  RESPIRATORY: Normal respiratory effort; lungs are clear to auscultation bilaterally  CARDIOVASCULAR: Regular rate and rhythm, normal S1 and S2, No lower extremity edema  ABDOMEN: Nontender to palpation, normoactive bowel sounds, no rebound/guarding  PSYCH: calm    LABS, RADIOLOGY & ADDITIONAL TESTS: Reviewed    COORDINATION OF CARE:  Care Discussed with Consultants/Other Providers [Y- Medicine ACP]
Medicine Progress Note    Patient is a 83y old  Male who presents with a chief complaint of Acute encephalopathy, Agitation (25 Feb 2024 12:37)      SUBJECTIVE / OVERNIGHT EVENTS: no events over night     ADDITIONAL REVIEW OF SYSTEMS: negative     MEDICATIONS  (STANDING):  amLODIPine   Tablet 5 milliGRAM(s) Oral daily  artificial tears (preservative free) Ophthalmic Solution 2 Drop(s) Left EYE four times a day  chlorhexidine 2% Cloths 1 Application(s) Topical daily  citalopram 20 milliGRAM(s) Oral daily  enoxaparin Injectable 40 milliGRAM(s) SubCutaneous every 24 hours  folic acid 1 milliGRAM(s) Oral daily  melatonin 3 milliGRAM(s) Oral at bedtime  memantine 5 milliGRAM(s) Oral daily  petrolatum Ophthalmic Ointment 1 Application(s) Left EYE at bedtime  QUEtiapine 75 milliGRAM(s) Oral <User Schedule>  senna 2 Tablet(s) Oral at bedtime    MEDICATIONS  (PRN):  AQUAPHOR (petrolatum Ointment) 1 Application(s) Topical four times a day PRN Chapped Lips    CAPILLARY BLOOD GLUCOSE        I&O's Summary      PHYSICAL EXAM:  Vital Signs Last 24 Hrs  T(C): 36.4 (26 Feb 2024 11:57), Max: 36.4 (26 Feb 2024 11:57)  T(F): 97.6 (26 Feb 2024 11:57), Max: 97.6 (26 Feb 2024 11:57)  HR: 75 (26 Feb 2024 11:57) (75 - 94)  BP: 155/66 (26 Feb 2024 11:57) (133/79 - 155/66)  BP(mean): --  RR: 18 (26 Feb 2024 11:57) (18 - 18)  SpO2: 99% (26 Feb 2024 11:57) (99% - 100%)    Parameters below as of 26 Feb 2024 11:57  Patient On (Oxygen Delivery Method): room air      CONSTITUTIONAL: NAD,   ENMT: Moist oral mucosa, no pharyngeal injection or exudates;   RESPIRATORY: Normal respiratory effort; lungs are clear to auscultation bilaterally  CARDIOVASCULAR: Regular rate and rhythm, normal S1 and S2, ; No lower extremity edema;   ABDOMEN: Nontender to palpation, normoactive bowel sounds, no rebound/guarding;  PSYCH: A+O to person,  affect appropriate  NEUROLOGY: CN 2-12 are intact and symmetric; no gross sensory deficits   SKIN: No rashes; no palpable lesions    LABS:                        RADIOLOGY & ADDITIONAL TESTS:  Imaging from Last 24 Hours:    Electrocardiogram/QTc Interval:    COORDINATION OF CARE:  Care Discussed with Consultants/Other Providers: ACP   
Medicine Progress Note    Patient is a 83y old  Male who presents with a chief complaint of Acute encephalopathy, Agitation (26 Feb 2024 15:14)      SUBJECTIVE / OVERNIGHT EVENTS:  no events     ADDITIONAL REVIEW OF SYSTEMS: negative     MEDICATIONS  (STANDING):  amLODIPine   Tablet 5 milliGRAM(s) Oral daily  artificial tears (preservative free) Ophthalmic Solution 2 Drop(s) Left EYE four times a day  chlorhexidine 2% Cloths 1 Application(s) Topical daily  citalopram 20 milliGRAM(s) Oral daily  enoxaparin Injectable 40 milliGRAM(s) SubCutaneous every 24 hours  folic acid 1 milliGRAM(s) Oral daily  melatonin 3 milliGRAM(s) Oral at bedtime  memantine 5 milliGRAM(s) Oral daily  petrolatum Ophthalmic Ointment 1 Application(s) Left EYE at bedtime  QUEtiapine 75 milliGRAM(s) Oral <User Schedule>  senna 2 Tablet(s) Oral at bedtime    MEDICATIONS  (PRN):  AQUAPHOR (petrolatum Ointment) 1 Application(s) Topical four times a day PRN Chapped Lips    CAPILLARY BLOOD GLUCOSE        I&O's Summary      PHYSICAL EXAM:  Vital Signs Last 24 Hrs  T(C): 36.7 (27 Feb 2024 05:00), Max: 36.7 (27 Feb 2024 05:00)  T(F): 98.1 (27 Feb 2024 05:00), Max: 98.1 (27 Feb 2024 05:00)  HR: 78 (27 Feb 2024 05:00) (78 - 80)  BP: 140/73 (27 Feb 2024 05:00) (140/73 - 144/68)  BP(mean): --  RR: 17 (27 Feb 2024 05:00) (17 - 17)  SpO2: 100% (27 Feb 2024 05:00) (100% - 100%)    Parameters below as of 27 Feb 2024 05:00  Patient On (Oxygen Delivery Method): room air      CONSTITUTIONAL: NAD,   ENMT: Moist oral mucosa, no pharyngeal injection or exudates;   RESPIRATORY: Normal respiratory effort; lungs are clear to auscultation bilaterally  CARDIOVASCULAR: Regular rate and rhythm, normal S1 and S2, ; No lower extremity edema;   ABDOMEN: Nontender to palpation, normoactive bowel sounds, no rebound/guarding;   PSYCH: A+O to person;   NEUROLOGY: CN 2-12 are intact and symmetric; no gross sensory deficits   SKIN: No rashes; no palpable lesions    LABS:                    COVID-19 PCR: Karolinateluciano (26 Feb 2024 12:49)      RADIOLOGY & ADDITIONAL TESTS:  Imaging from Last 24 Hours:    Electrocardiogram/QTc Interval:    COORDINATION OF CARE:  Care Discussed with Consultants/Other Providers: ACP   
Medicine Progress Note    Patient is a 83y old  Male who presents with a chief complaint of Acute encephalopathy, Agitation (29 Feb 2024 14:36)      SUBJECTIVE / OVERNIGHT EVENTS: no events over night     ADDITIONAL REVIEW OF SYSTEMS: negative     MEDICATIONS  (STANDING):  amLODIPine   Tablet 5 milliGRAM(s) Oral daily  artificial tears (preservative free) Ophthalmic Solution 2 Drop(s) Left EYE four times a day  chlorhexidine 2% Cloths 1 Application(s) Topical daily  citalopram 20 milliGRAM(s) Oral daily  divalproex  milliGRAM(s) Oral two times a day  enoxaparin Injectable 40 milliGRAM(s) SubCutaneous every 24 hours  folic acid 1 milliGRAM(s) Oral daily  melatonin 3 milliGRAM(s) Oral at bedtime  memantine 5 milliGRAM(s) Oral daily  petrolatum Ophthalmic Ointment 1 Application(s) Left EYE at bedtime  QUEtiapine 100 milliGRAM(s) Oral <User Schedule>  senna 2 Tablet(s) Oral at bedtime    MEDICATIONS  (PRN):  AQUAPHOR (petrolatum Ointment) 1 Application(s) Topical four times a day PRN Chapped Lips    CAPILLARY BLOOD GLUCOSE        I&O's Summary    29 Feb 2024 07:01  -  01 Mar 2024 07:00  --------------------------------------------------------  IN: 350 mL / OUT: 475 mL / NET: -125 mL        PHYSICAL EXAM:  Vital Signs Last 24 Hrs  T(C): 36.9 (01 Mar 2024 11:27), Max: 36.9 (01 Mar 2024 05:00)  T(F): 98.5 (01 Mar 2024 11:27), Max: 98.5 (01 Mar 2024 11:27)  HR: 69 (01 Mar 2024 11:27) (60 - 71)  BP: 150/80 (01 Mar 2024 11:27) (127/65 - 150/80)  BP(mean): --  RR: 17 (01 Mar 2024 11:27) (17 - 17)  SpO2: 100% (01 Mar 2024 11:27) (100% - 100%)    Parameters below as of 01 Mar 2024 11:27  Patient On (Oxygen Delivery Method): room air      CONSTITUTIONAL: NAD,   ENMT: Moist oral mucosa, no pharyngeal injection or exudates;   RESPIRATORY: Normal respiratory effort; lungs are clear to auscultation bilaterally  CARDIOVASCULAR: Regular rate and rhythm, normal S1 and S2,  No lower extremity edema;   ABDOMEN: Nontender to palpation,   PSYCH: A+O zero   NEUROLOGY: grossly non focal   SKIN: No rashes; no palpable lesions    LABS:                    SARS-CoV-2: NotDetec (29 Feb 2024 11:10)  COVID-19 PCR: NotDetec (26 Feb 2024 12:49)      RADIOLOGY & ADDITIONAL TESTS:  Imaging from Last 24 Hours:    Electrocardiogram/QTc Interval:    COORDINATION OF CARE:  Care Discussed with Consultants/Other Providers: ACP   
Medicine Progress Note    Patient is a 83y old  Male who presents with a chief complaint of Acute encephalopathy, Agitation (27 Feb 2024 13:29)      SUBJECTIVE / OVERNIGHT EVENTS: no medical complaints     ADDITIONAL REVIEW OF SYSTEMS: negative     MEDICATIONS  (STANDING):  amLODIPine   Tablet 5 milliGRAM(s) Oral daily  artificial tears (preservative free) Ophthalmic Solution 2 Drop(s) Left EYE four times a day  chlorhexidine 2% Cloths 1 Application(s) Topical daily  citalopram 20 milliGRAM(s) Oral daily  enoxaparin Injectable 40 milliGRAM(s) SubCutaneous every 24 hours  folic acid 1 milliGRAM(s) Oral daily  melatonin 3 milliGRAM(s) Oral at bedtime  memantine 5 milliGRAM(s) Oral daily  petrolatum Ophthalmic Ointment 1 Application(s) Left EYE at bedtime  QUEtiapine 100 milliGRAM(s) Oral <User Schedule>  senna 2 Tablet(s) Oral at bedtime    MEDICATIONS  (PRN):  AQUAPHOR (petrolatum Ointment) 1 Application(s) Topical four times a day PRN Chapped Lips    CAPILLARY BLOOD GLUCOSE        I&O's Summary      PHYSICAL EXAM:  Vital Signs Last 24 Hrs  T(C): 36.9 (28 Feb 2024 05:00), Max: 36.9 (28 Feb 2024 05:00)  T(F): 98.4 (28 Feb 2024 05:00), Max: 98.4 (28 Feb 2024 05:00)  HR: 81 (28 Feb 2024 05:00) (81 - 94)  BP: 142/84 (28 Feb 2024 05:00) (142/84 - 144/85)  BP(mean): --  RR: 18 (28 Feb 2024 05:00) (18 - 18)  SpO2: 100% (28 Feb 2024 05:00) (98% - 100%)    Parameters below as of 28 Feb 2024 05:00  Patient On (Oxygen Delivery Method): room air      CONSTITUTIONAL: NAD,   ENMT: Moist oral mucosa,  RESPIRATORY: Normal respiratory effort; lungs are clear to auscultation bilaterally  CARDIOVASCULAR: Regular rate and rhythm, normal S1 and S2,; No lower extremity edema;   ABDOMEN: Nontender to palpation, normoactive bowel sounds,   PSYCH: A+O  none   NEUROLOGY: grossly non focal   SKIN: No rashes; no palpable lesions    LABS:                    COVID-19 PCR: NotDetec (26 Feb 2024 12:49)      RADIOLOGY & ADDITIONAL TESTS:  Imaging from Last 24 Hours:    Electrocardiogram/QTc Interval:    COORDINATION OF CARE:  Care Discussed with Consultants/Other Providers: ACP   
[facility] Division of Hospital Medicine  Jeni Ross MD  Available via MS Teams  Pager: [phone]    SUBJECTIVE / OVERNIGHT EVENTS:  no events. pt denies complaints.     MEDICATIONS  (STANDING):  amLODIPine   Tablet 5 milliGRAM(s) Oral daily  artificial tears (preservative free) Ophthalmic Solution 2 Drop(s) Left EYE four times a day  citalopram 20 milliGRAM(s) Oral daily  enoxaparin Injectable 40 milliGRAM(s) SubCutaneous every 24 hours  folic acid 1 milliGRAM(s) Oral daily  melatonin 3 milliGRAM(s) Oral at bedtime  memantine 5 milliGRAM(s) Oral daily  petrolatum Ophthalmic Ointment 1 Application(s) Left EYE at bedtime  prednisoLONE acetate 1% Suspension 1 Drop(s) Left EYE daily  QUEtiapine 50 milliGRAM(s) Oral at bedtime  senna 2 Tablet(s) Oral at bedtime  sodium chloride 0.9%. 1000 milliLiter(s) (75 mL/Hr) IV Continuous <Continuous>    MEDICATIONS  (PRN):  AQUAPHOR (petrolatum Ointment) 1 Application(s) Topical four times a day PRN Chapped Lips      I&O's Summary      PHYSICAL EXAM:  Vital Signs Last 24 Hrs  T(C): 36.3 (20 Feb 2024 11:14), Max: 36.6 (19 Feb 2024 19:34)  T(F): 97.3 (20 Feb 2024 11:14), Max: 97.8 (19 Feb 2024 19:34)  HR: 72 (20 Feb 2024 11:14) (65 - 74)  BP: 121/59 (20 Feb 2024 11:14) (121/59 - 153/85)  BP(mean): --  RR: 18 (20 Feb 2024 11:14) (16 - 18)  SpO2: 100% (20 Feb 2024 11:14) (97% - 100%)    Parameters below as of 20 Feb 2024 11:14  Patient On (Oxygen Delivery Method): room air      CONSTITUTIONAL: NAD  EYES: PERRLA; conjunctiva and sclera clear  ENMT: Moist oral mucosa, no pharyngeal injection or exudates  NECK: Supple, no palpable masses  RESPIRATORY: Normal respiratory effort; lungs are clear to auscultation bilaterally  CARDIOVASCULAR: Regular rate and rhythm, normal S1 and S2, no murmur/rub/gallop; No lower extremity edema; Peripheral pulses are 2+ bilaterally  ABDOMEN: Nontender to palpation, normoactive bowel sounds, no rebound/guarding  MUSCULOSKELETAL:  no clubbing or cyanosis of digits; no joint swelling or tenderness to palpation  PSYCH: A+O to person and hospital- does not know which hospital, does not know date  NEUROLOGY: CN 2-12 are intact and symmetric; no gross sensory deficits   SKIN: No rashes; no palpable lesions    LABS:                        12.7   5.09  )-----------( 177      ( 20 Feb 2024 04:45 )             40.1     02-20    142  |  107  |  15  ----------------------------<  82  4.1   |  25  |  0.90    Ca    8.7      20 Feb 2024 04:45  Phos  3.2     02-19  Mg     1.90     02-19    TPro  6.0  /  Alb  3.3  /  TBili  0.7  /  DBili  x   /  AST  25  /  ALT  14  /  AlkPhos  82  02-20          Urinalysis Basic - ( 20 Feb 2024 04:45 )    Color: x / Appearance: x / SG: x / pH: x  Gluc: 82 mg/dL / Ketone: x  / Bili: x / Urobili: x   Blood: x / Protein: x / Nitrite: x   Leuk Esterase: x / RBC: x / WBC x   Sq Epi: x / Non Sq Epi: x / Bacteria: x        Culture - Urine (collected 18 Feb 2024 19:30)  Source: Clean Catch Clean Catch (Midstream)  Final Report (19 Feb 2024 21:55):    <10,000 CFU/mL Normal Urogenital Charissa                
[facility] Division of Hospital Medicine  Jeni Ross MD  Available via MS Teams  Pager: [phone]    SUBJECTIVE / OVERNIGHT EVENTS:  pt requiring PRNs last night and am for agitation  now calm, sleeping    MEDICATIONS  (STANDING):  amLODIPine   Tablet 5 milliGRAM(s) Oral daily  artificial tears (preservative free) Ophthalmic Solution 2 Drop(s) Left EYE four times a day  chlorhexidine 2% Cloths 1 Application(s) Topical daily  citalopram 20 milliGRAM(s) Oral daily  enoxaparin Injectable 40 milliGRAM(s) SubCutaneous every 24 hours  folic acid 1 milliGRAM(s) Oral daily  melatonin 3 milliGRAM(s) Oral at bedtime  memantine 5 milliGRAM(s) Oral daily  petrolatum Ophthalmic Ointment 1 Application(s) Left EYE at bedtime  QUEtiapine 75 milliGRAM(s) Oral <User Schedule>  senna 2 Tablet(s) Oral at bedtime    MEDICATIONS  (PRN):  AQUAPHOR (petrolatum Ointment) 1 Application(s) Topical four times a day PRN Chapped Lips      I&O's Summary      PHYSICAL EXAM:  Vital Signs Last 24 Hrs  T(C): 36.7 (25 Feb 2024 04:58), Max: 36.7 (25 Feb 2024 04:58)  T(F): 98.1 (25 Feb 2024 04:58), Max: 98.1 (25 Feb 2024 04:58)  HR: 65 (25 Feb 2024 04:58) (65 - 75)  BP: 154/94 (25 Feb 2024 04:58) (136/61 - 154/94)  BP(mean): --  RR: 18 (25 Feb 2024 04:58) (18 - 18)  SpO2: 100% (25 Feb 2024 04:58) (99% - 100%)    Parameters below as of 25 Feb 2024 04:58  Patient On (Oxygen Delivery Method): room air      CONSTITUTIONAL: NAD  EYES: PERRLA; conjunctiva and sclera clear  ENMT: Moist oral mucosa, no pharyngeal injection or exudates  NECK: Supple, no palpable masses  RESPIRATORY: Normal respiratory effort; lungs are clear to auscultation bilaterally  CARDIOVASCULAR: Regular rate and rhythm, normal S1 and S2, no murmur/rub/gallop; No lower extremity edema; Peripheral pulses are 2+ bilaterally  ABDOMEN: Nontender to palpation, normoactive bowel sounds, no rebound/guarding  MUSCULOSKELETAL:  no clubbing or cyanosis of digits; no joint swelling or tenderness to palpation  PSYCH: A+O to person only  NEUROLOGY: CN 2-12 are intact and symmetric; no gross sensory deficits   SKIN: No rashes; no palpable lesions    LABS:                              
Medicine Progress Note    Patient is a 83y old  Male who presents with a chief complaint of Acute encephalopathy, Agitation (28 Feb 2024 15:52)      SUBJECTIVE / OVERNIGHT EVENTS: no events over night     ADDITIONAL REVIEW OF SYSTEMS: negative     MEDICATIONS  (STANDING):  amLODIPine   Tablet 5 milliGRAM(s) Oral daily  artificial tears (preservative free) Ophthalmic Solution 2 Drop(s) Left EYE four times a day  chlorhexidine 2% Cloths 1 Application(s) Topical daily  citalopram 20 milliGRAM(s) Oral daily  divalproex  milliGRAM(s) Oral two times a day  enoxaparin Injectable 40 milliGRAM(s) SubCutaneous every 24 hours  folic acid 1 milliGRAM(s) Oral daily  melatonin 3 milliGRAM(s) Oral at bedtime  memantine 5 milliGRAM(s) Oral daily  petrolatum Ophthalmic Ointment 1 Application(s) Left EYE at bedtime  QUEtiapine 100 milliGRAM(s) Oral <User Schedule>  senna 2 Tablet(s) Oral at bedtime    MEDICATIONS  (PRN):  AQUAPHOR (petrolatum Ointment) 1 Application(s) Topical four times a day PRN Chapped Lips    CAPILLARY BLOOD GLUCOSE        I&O's Summary      PHYSICAL EXAM:  Vital Signs Last 24 Hrs  T(C): 36.6 (29 Feb 2024 06:02), Max: 37 (28 Feb 2024 20:40)  T(F): 97.8 (29 Feb 2024 06:02), Max: 98.6 (28 Feb 2024 20:40)  HR: 100 (29 Feb 2024 06:02) (70 - 100)  BP: 125/90 (29 Feb 2024 06:02) (112/65 - 140/83)  BP(mean): --  RR: 18 (29 Feb 2024 06:02) (17 - 18)  SpO2: 100% (29 Feb 2024 06:02) (98% - 100%)    Parameters below as of 29 Feb 2024 06:02  Patient On (Oxygen Delivery Method): room air      CONSTITUTIONAL: NAD,  ENMT: Moist oral mucosa, no pharyngeal injection or exudates;   RESPIRATORY: Normal respiratory effort; lungs are clear to auscultation bilaterally  CARDIOVASCULAR: Regular rate and rhythm, normal S1 and S2, no murmur/rub/gallop;   ABDOMEN: Nontender to palpation, normoactive bowel sounds, no rebound/guarding;  PSYCH: A+O none   NEUROLOGY: grossly no other focal abormality   SKIN: No rashes; no palpable lesions    LABS:                    SARS-CoV-2: NotDetec (29 Feb 2024 11:10)  COVID-19 PCR: NotDetec (26 Feb 2024 12:49)      RADIOLOGY & ADDITIONAL TESTS:  Imaging from Last 24 Hours:    Electrocardiogram/QTc Interval:    COORDINATION OF CARE:  Care Discussed with Consultants/Other Providers: CAMILA   
[facility] Division of Hospital Medicine  Jeni Ross MD  Available via MS Teams  Pager: [phone]    SUBJECTIVE / OVERNIGHT EVENTS:  no events. pt AO x2    MEDICATIONS  (STANDING):  amLODIPine   Tablet 5 milliGRAM(s) Oral daily  artificial tears (preservative free) Ophthalmic Solution 2 Drop(s) Left EYE four times a day  chlorhexidine 2% Cloths 1 Application(s) Topical daily  citalopram 20 milliGRAM(s) Oral daily  enoxaparin Injectable 40 milliGRAM(s) SubCutaneous every 24 hours  folic acid 1 milliGRAM(s) Oral daily  melatonin 3 milliGRAM(s) Oral at bedtime  memantine 5 milliGRAM(s) Oral daily  petrolatum Ophthalmic Ointment 1 Application(s) Left EYE at bedtime  QUEtiapine 75 milliGRAM(s) Oral <User Schedule>  senna 2 Tablet(s) Oral at bedtime    MEDICATIONS  (PRN):  AQUAPHOR (petrolatum Ointment) 1 Application(s) Topical four times a day PRN Chapped Lips      I&O's Summary      PHYSICAL EXAM:  Vital Signs Last 24 Hrs  T(C): 36.3 (22 Feb 2024 12:13), Max: 36.4 (21 Feb 2024 20:02)  T(F): 97.4 (22 Feb 2024 12:13), Max: 97.6 (21 Feb 2024 20:02)  HR: 70 (22 Feb 2024 12:13) (65 - 75)  BP: 160/80 (22 Feb 2024 12:13) (139/87 - 160/80)  BP(mean): --  RR: 18 (22 Feb 2024 12:13) (17 - 18)  SpO2: 100% (22 Feb 2024 12:13) (98% - 100%)    Parameters below as of 22 Feb 2024 12:13  Patient On (Oxygen Delivery Method): room air      CONSTITUTIONAL: NAD  EYES: PERRLA; conjunctiva and sclera clear  ENMT: Moist oral mucosa, no pharyngeal injection or exudates  NECK: Supple, no palpable masses  RESPIRATORY: Normal respiratory effort; lungs are clear to auscultation bilaterally  CARDIOVASCULAR: Regular rate and rhythm, normal S1 and S2, no murmur/rub/gallop; No lower extremity edema; Peripheral pulses are 2+ bilaterally  ABDOMEN: Nontender to palpation, normoactive bowel sounds, no rebound/guarding  MUSCULOSKELETAL:  no clubbing or cyanosis of digits; no joint swelling or tenderness to palpation  PSYCH: A+O to person, and hospital, not to time  NEUROLOGY: CN 2-12 are intact and symmetric; no gross sensory deficits   SKIN: No rashes; no palpable lesions    LABS:                        15.0   6.91  )-----------( 210      ( 22 Feb 2024 06:00 )             46.8     02-22    144  |  108<H>  |  14  ----------------------------<  70  4.5   |  23  |  0.91    Ca    9.4      22 Feb 2024 06:00    TPro  7.0  /  Alb  3.6  /  TBili  0.3  /  DBili  x   /  AST  30  /  ALT  19  /  AlkPhos  98  02-22          Urinalysis Basic - ( 22 Feb 2024 06:00 )    Color: x / Appearance: x / SG: x / pH: x  Gluc: 70 mg/dL / Ketone: x  / Bili: x / Urobili: x   Blood: x / Protein: x / Nitrite: x   Leuk Esterase: x / RBC: x / WBC x   Sq Epi: x / Non Sq Epi: x / Bacteria: x

## 2024-03-07 NOTE — BH CONSULTATION LIAISON PROGRESS NOTE - NSBHPTASSESSDT_PSY_A_CORE
21-Feb-2024 11:33
04-Mar-2024 11:37
07-Mar-2024 11:53
01-Mar-2024 12:00
05-Mar-2024 10:14
28-Feb-2024 10:57
23-Feb-2024 13:02
26-Feb-2024 12:04
27-Feb-2024 10:53
22-Feb-2024 10:22
29-Feb-2024 12:27

## 2024-03-07 NOTE — PROGRESS NOTE ADULT - PROBLEM SELECTOR PLAN 7
DVT: Lovenox   Diet: DASH with Regular Consistency  Dispo: pending Psych optimization, transfer to psych hospital
- Hx of recent cataract surgery on 1/20/24 with left eye erythema on exam. Hx bilateral cataract surgery in the further past  - Per [facility] med rec, has completed Ketorolac eye drops and Ofloxacin eyedrops  - Was also prescribed Prednisolone acetate 1% drops, unclear if completed. Initial script was for 30 days starting on 1/20.   - C/w 1-2 days of Prednisolone drops based on med rec.  - Ophtho recs appreciated
DVT: Lovenox   Diet: DASH with Regular Consistency  Dispo: pending psych placement
- Hx of recent cataract surgery on 1/20/24 with left eye erythema on exam. Hx bilateral cataract surgery in the further past  - Per NH med rec, has completed Ketorolac eye drops and Ofloxacin eyedrops  - Was also prescribed Prednisolone acetate 1% drops, unclear if completed. Initial script was for 30 days starting on 1/20.   - C/w 1-2 days of Prednisolone drops based on med rec.  - Ophtho recs appreciated
DVT: Lovenox   Diet: DASH with Regular Consistency  Dispo: pending Psych clearance for discharge
DVT: Lovenox   Diet: DASH with Regular Consistency  Dispo: pending placement
DVT: Lovenox   Diet: DASH with Regular Consistency  Dispo: pending Psych optimization and dispo recs
DVT: Lovenox   Diet: DASH with Regular Consistency  Dispo: pending psych placement
DVT: Lovenox   Diet: DASH with Regular Consistency  Dispo: pending Psych optimization
DVT: Lovenox   Diet: DASH with Regular Consistency  Dispo: pending Psych optimization, transfer to psych hospital
- Hx of recent cataract surgery on 1/20/24 with left eye erythema on exam. Hx bilateral cataract surgery in the further past  - Per NH med rec, has completed Ketorolac eye drops and Ofloxacin eyedrops  - Was also prescribed Prednisolone acetate 1% drops, unclear if completed. Initial script was for 30 days starting on 1/20.   - C/w 1-2 days of Prednisolone drops based on med rec.  - Ophtho recs appreciated
DVT: Lovenox   Diet: DASH with Regular Consistency  Dispo: pending Psych optimization
- Hx of recent cataract surgery on 1/20/24 with left eye erythema on exam. Hx bilateral cataract surgery in the further past  - Per NH med rec, has completed Ketorolac eye drops and Ofloxacin eyedrops  - Was also prescribed Prednisolone acetate 1% drops, unclear if completed. Initial script was for 30 days starting on 1/20.   - C/w 1-2 days of Prednisolone drops based on med rec.  - Ophtho recs appreciated
DVT: Lovenox   Diet: DASH with Regular Consistency  Dispo: pending Psych optimization, transfer to psych hospital
DVT: Lovenox   Diet: DASH with Regular Consistency  Dispo: pending Psych clearance for discharge
DVT: Lovenox   Diet: DASH with Regular Consistency  Dispo: pending Psych optimization, transfer to psych hospital

## 2024-03-07 NOTE — BH CONSULTATION LIAISON PROGRESS NOTE - NSBHASSESSMENTFT_PSY_ALL_CORE
82 yo male with pmhx of Dementia (?Alzheimer's), cataracts s/p cataract surgery (on 1/20/24), hypertension, and xerotic dermatitis, p/w agitation for the past 2 days with 1 week of insomnia. Psych consulted for agitation. Pt presents w/ dementia hx now worsened by recent insomnia, unclear precipitants. Warrants medical as well as psychiatric optimization.     2/21- In behavorial control. No PRNs required for agitation overnight.  Compliant with standing medication.  2/22- Received Zyprexa PRN overnight, but in behavorial control on exam. no SIIP  2/23-No PRNs overnight, remains w/ intermittent periods of agitation. no SIIP  2/26: continued PRNs over weekend, not sleeping at night. No SIIP though poor insight/impulse control  2/27: PRN overnight with clear sundowning pattern. Remains disorganized without insight.  2/28: no PRN overnight, though up at night. Tolerating medication. Remains disorganized without insight.  2/29-3/1: no PRN overnight, agitated/sexually inappropriate today, impulsive  3/4: sexually inappropriate/impulsive/disinhibited.   3/5: calmer though disorganized, in NAD but pleasantly confused/disoriented/disorganized  3/7: much improved, calmer, no PRNs in days. Tolerating meds.     Plan:   - Observation per 7S team; can utilize [facility] aid if/when available  - C/W Seroquel 100mg PO Q 7PM to prevent sundowning/agitation  - C/W Depakote 250mg TID standing.  - C/W Melatonin, Memantine  - Lacks capacity, cannot leave AMA  - Dispo: at this point would attempt return to [facility] as he has improved greatly; can follow-up with usual outpt psych as well. Currently indication for geropsych inpt psych is absent.

## 2024-03-07 NOTE — PROGRESS NOTE ADULT - PROBLEM SELECTOR PLAN 4
- Hx of Dementia, possible Alzheimer's. Follow with [facility]-Psych Dr. Mahan at [facility].   - c/w home Memantine and Seroquel as above. QTc check periodically
- Hx of Dementia, possible Alzheimer's. Follow with Cecille-Psych Dr. Mahan at [facility].   - c/w home Memantine and Seroquel as above. QTc 420
- Hx of Dementia, possible Alzheimer's. Follow with Cecille-Psych Dr. Mahan at [facility].   - c/w home Memantine and Seroquel as above. QTc 420
- Hx of Dementia, possible Alzheimer's. Follow with Cecille-Psych Dr. Mahan at [facility].   - c/w home Memantine and Seroquel as above. QTc check periodically
- Hx of Dementia, possible Alzheimer's. Follow with Cecille-Psych Dr. Mahan at [facility].   - c/w home Memantine and Seroquel as above. QTc 420
- Hx of Dementia, possible Alzheimer's. Follow with Cecille-Psych Dr. Mahan at [facility].   - c/w home Memantine and Seroquel as above. QTc 420
- Hx of Dementia, possible Alzheimer's. Follow with Cecille-Psych Dr. Mahan at [facility].   - c/w home Memantine and Seroquel as above. QTc check periodically
- Hx of Dementia, possible Alzheimer's. Follow with Cecille-Psych Dr. Mahan at [facility].   - c/w home Memantine and Seroquel as above. QTc check periodically
- Hx of Dementia, possible Alzheimer's. Follow with Cecille-Psych Dr. Mahan at [facility].   - c/w home Memantine and Seroquel as above. QTc check periodically
- Hx of Dementia, possible Alzheimer's. Follow with Cecille-Psych Dr. Mahan at [facility].   - c/w home Memantine and Seroquel as above. QTc check periodically
- Hx of Dementia, possible Alzheimer's. Follow with Cecille-Psych Dr. Mahan at [facility].   - c/w home Memantine and Seroquel as above. QTc 420
- Hx of Dementia, possible Alzheimer's. Follow with Cecille-Psych Dr. Mahan at [facility].   - c/w home Memantine and Seroquel as above. QTc check periodically
- Hx of Dementia, possible Alzheimer's. Follow with Cecille-Psych Dr. Mahan at [facility].   - c/w home Memantine and Seroquel as above. QTc 420
- Hx of Dementia, possible Alzheimer's. Follow with Cecille-Psych Dr. Mahan at [facility].   - c/w home Memantine and Seroquel as above. QTc check periodically
- Hx of Dementia, possible Alzheimer's. Follow with Cecille-Psych Dr. Mahan at [facility].   - c/w home Memantine and Seroquel as above. QTc 420
- Hx of Dementia, possible Alzheimer's. Follow with Cecille-Psych Dr. Mahan at [facility].   - c/w home Memantine and Seroquel as above. QTc check periodically
- Hx of Dementia, possible Alzheimer's. Follow with Cecille-Psych Dr. Mahan at [facility].   - c/w home Memantine and Seroquel as above. QTc 420
- Hx of Dementia, possible Alzheimer's. Follow with Cecille-Psych Dr. Mahan at [facility].   - c/w home Memantine and Seroquel as above. QTc 420  - Psych eval in AM (voicemail left)

## 2024-03-07 NOTE — BH CONSULTATION LIAISON PROGRESS NOTE - NSICDXBHPRIMARYDX_PSY_ALL_CORE
Dementia   F03.90  
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
## 2024-03-07 NOTE — BH CONSULTATION LIAISON PROGRESS NOTE - CURRENT MEDICATION
MEDICATIONS  (STANDING):  amLODIPine   Tablet 5 milliGRAM(s) Oral daily  artificial tears (preservative free) Ophthalmic Solution 2 Drop(s) Left EYE four times a day  chlorhexidine 2% Cloths 1 Application(s) Topical daily  citalopram 20 milliGRAM(s) Oral daily  divalproex  milliGRAM(s) Oral two times a day  enoxaparin Injectable 40 milliGRAM(s) SubCutaneous every 24 hours  folic acid 1 milliGRAM(s) Oral daily  melatonin 3 milliGRAM(s) Oral at bedtime  memantine 5 milliGRAM(s) Oral daily  petrolatum Ophthalmic Ointment 1 Application(s) Left EYE at bedtime  QUEtiapine 100 milliGRAM(s) Oral <User Schedule>  senna 2 Tablet(s) Oral at bedtime    MEDICATIONS  (PRN):  AQUAPHOR (petrolatum Ointment) 1 Application(s) Topical four times a day PRN Chapped Lips  
MEDICATIONS  (STANDING):  amLODIPine   Tablet 5 milliGRAM(s) Oral daily  artificial tears (preservative free) Ophthalmic Solution 2 Drop(s) Left EYE four times a day  chlorhexidine 2% Cloths 1 Application(s) Topical daily  citalopram 20 milliGRAM(s) Oral daily  enoxaparin Injectable 40 milliGRAM(s) SubCutaneous every 24 hours  folic acid 1 milliGRAM(s) Oral daily  melatonin 3 milliGRAM(s) Oral at bedtime  memantine 5 milliGRAM(s) Oral daily  petrolatum Ophthalmic Ointment 1 Application(s) Left EYE at bedtime  QUEtiapine 75 milliGRAM(s) Oral <User Schedule>  senna 2 Tablet(s) Oral at bedtime    MEDICATIONS  (PRN):  AQUAPHOR (petrolatum Ointment) 1 Application(s) Topical four times a day PRN Chapped Lips  
MEDICATIONS  (STANDING):  amLODIPine   Tablet 5 milliGRAM(s) Oral daily  artificial tears (preservative free) Ophthalmic Solution 2 Drop(s) Left EYE four times a day  chlorhexidine 2% Cloths 1 Application(s) Topical daily  citalopram 20 milliGRAM(s) Oral daily  divalproex  milliGRAM(s) Oral two times a day  enoxaparin Injectable 40 milliGRAM(s) SubCutaneous every 24 hours  folic acid 1 milliGRAM(s) Oral daily  melatonin 3 milliGRAM(s) Oral at bedtime  memantine 5 milliGRAM(s) Oral daily  petrolatum Ophthalmic Ointment 1 Application(s) Left EYE at bedtime  QUEtiapine 100 milliGRAM(s) Oral <User Schedule>  senna 2 Tablet(s) Oral at bedtime    MEDICATIONS  (PRN):  AQUAPHOR (petrolatum Ointment) 1 Application(s) Topical four times a day PRN Chapped Lips  
MEDICATIONS  (STANDING):  amLODIPine   Tablet 5 milliGRAM(s) Oral daily  artificial tears (preservative free) Ophthalmic Solution 2 Drop(s) Left EYE four times a day  chlorhexidine 2% Cloths 1 Application(s) Topical daily  divalproex  milliGRAM(s) Oral three times a day  enoxaparin Injectable 40 milliGRAM(s) SubCutaneous every 24 hours  folic acid 1 milliGRAM(s) Oral daily  melatonin 3 milliGRAM(s) Oral at bedtime  memantine 5 milliGRAM(s) Oral daily  petrolatum Ophthalmic Ointment 1 Application(s) Left EYE at bedtime  QUEtiapine 100 milliGRAM(s) Oral <User Schedule>  senna 2 Tablet(s) Oral at bedtime    MEDICATIONS  (PRN):  AQUAPHOR (petrolatum Ointment) 1 Application(s) Topical four times a day PRN Chapped Lips  
MEDICATIONS  (STANDING):  amLODIPine   Tablet 5 milliGRAM(s) Oral daily  artificial tears (preservative free) Ophthalmic Solution 2 Drop(s) Left EYE four times a day  chlorhexidine 2% Cloths 1 Application(s) Topical daily  citalopram 20 milliGRAM(s) Oral daily  enoxaparin Injectable 40 milliGRAM(s) SubCutaneous every 24 hours  folic acid 1 milliGRAM(s) Oral daily  melatonin 3 milliGRAM(s) Oral at bedtime  memantine 5 milliGRAM(s) Oral daily  petrolatum Ophthalmic Ointment 1 Application(s) Left EYE at bedtime  QUEtiapine 100 milliGRAM(s) Oral <User Schedule>  senna 2 Tablet(s) Oral at bedtime    MEDICATIONS  (PRN):  AQUAPHOR (petrolatum Ointment) 1 Application(s) Topical four times a day PRN Chapped Lips  
MEDICATIONS  (STANDING):  amLODIPine   Tablet 5 milliGRAM(s) Oral daily  artificial tears (preservative free) Ophthalmic Solution 2 Drop(s) Left EYE four times a day  chlorhexidine 2% Cloths 1 Application(s) Topical daily  citalopram 20 milliGRAM(s) Oral daily  enoxaparin Injectable 40 milliGRAM(s) SubCutaneous every 24 hours  folic acid 1 milliGRAM(s) Oral daily  melatonin 3 milliGRAM(s) Oral at bedtime  memantine 5 milliGRAM(s) Oral daily  petrolatum Ophthalmic Ointment 1 Application(s) Left EYE at bedtime  prednisoLONE acetate 1% Suspension 1 Drop(s) Left EYE daily  QUEtiapine 50 milliGRAM(s) Oral at bedtime  senna 2 Tablet(s) Oral at bedtime    MEDICATIONS  (PRN):  AQUAPHOR (petrolatum Ointment) 1 Application(s) Topical four times a day PRN Chapped Lips  
MEDICATIONS  (STANDING):  amLODIPine   Tablet 5 milliGRAM(s) Oral daily  artificial tears (preservative free) Ophthalmic Solution 2 Drop(s) Left EYE four times a day  chlorhexidine 2% Cloths 1 Application(s) Topical daily  divalproex  milliGRAM(s) Oral three times a day  enoxaparin Injectable 40 milliGRAM(s) SubCutaneous every 24 hours  folic acid 1 milliGRAM(s) Oral daily  melatonin 3 milliGRAM(s) Oral at bedtime  memantine 5 milliGRAM(s) Oral daily  petrolatum Ophthalmic Ointment 1 Application(s) Left EYE at bedtime  QUEtiapine 100 milliGRAM(s) Oral <User Schedule>  senna 2 Tablet(s) Oral at bedtime    MEDICATIONS  (PRN):  AQUAPHOR (petrolatum Ointment) 1 Application(s) Topical four times a day PRN Chapped Lips  
MEDICATIONS  (STANDING):  amLODIPine   Tablet 5 milliGRAM(s) Oral daily  artificial tears (preservative free) Ophthalmic Solution 2 Drop(s) Left EYE four times a day  chlorhexidine 2% Cloths 1 Application(s) Topical daily  citalopram 20 milliGRAM(s) Oral daily  enoxaparin Injectable 40 milliGRAM(s) SubCutaneous every 24 hours  folic acid 1 milliGRAM(s) Oral daily  melatonin 3 milliGRAM(s) Oral at bedtime  memantine 5 milliGRAM(s) Oral daily  petrolatum Ophthalmic Ointment 1 Application(s) Left EYE at bedtime  QUEtiapine 75 milliGRAM(s) Oral <User Schedule>  senna 2 Tablet(s) Oral at bedtime    MEDICATIONS  (PRN):  AQUAPHOR (petrolatum Ointment) 1 Application(s) Topical four times a day PRN Chapped Lips  
MEDICATIONS  (STANDING):  amLODIPine   Tablet 5 milliGRAM(s) Oral daily  artificial tears (preservative free) Ophthalmic Solution 2 Drop(s) Left EYE four times a day  chlorhexidine 2% Cloths 1 Application(s) Topical daily  citalopram 20 milliGRAM(s) Oral daily  enoxaparin Injectable 40 milliGRAM(s) SubCutaneous every 24 hours  folic acid 1 milliGRAM(s) Oral daily  melatonin 3 milliGRAM(s) Oral at bedtime  memantine 5 milliGRAM(s) Oral daily  petrolatum Ophthalmic Ointment 1 Application(s) Left EYE at bedtime  QUEtiapine 75 milliGRAM(s) Oral <User Schedule>  senna 2 Tablet(s) Oral at bedtime    MEDICATIONS  (PRN):  AQUAPHOR (petrolatum Ointment) 1 Application(s) Topical four times a day PRN Chapped Lips  
MEDICATIONS  (STANDING):  amLODIPine   Tablet 5 milliGRAM(s) Oral daily  artificial tears (preservative free) Ophthalmic Solution 2 Drop(s) Left EYE four times a day  chlorhexidine 2% Cloths 1 Application(s) Topical daily  citalopram 20 milliGRAM(s) Oral daily  enoxaparin Injectable 40 milliGRAM(s) SubCutaneous every 24 hours  folic acid 1 milliGRAM(s) Oral daily  melatonin 3 milliGRAM(s) Oral at bedtime  memantine 5 milliGRAM(s) Oral daily  petrolatum Ophthalmic Ointment 1 Application(s) Left EYE at bedtime  QUEtiapine 50 milliGRAM(s) Oral at bedtime  senna 2 Tablet(s) Oral at bedtime    MEDICATIONS  (PRN):  AQUAPHOR (petrolatum Ointment) 1 Application(s) Topical four times a day PRN Chapped Lips  
MEDICATIONS  (STANDING):  amLODIPine   Tablet 5 milliGRAM(s) Oral daily  artificial tears (preservative free) Ophthalmic Solution 2 Drop(s) Left EYE four times a day  chlorhexidine 2% Cloths 1 Application(s) Topical daily  citalopram 20 milliGRAM(s) Oral daily  enoxaparin Injectable 40 milliGRAM(s) SubCutaneous every 24 hours  folic acid 1 milliGRAM(s) Oral daily  melatonin 3 milliGRAM(s) Oral at bedtime  memantine 5 milliGRAM(s) Oral daily  petrolatum Ophthalmic Ointment 1 Application(s) Left EYE at bedtime  QUEtiapine 100 milliGRAM(s) Oral <User Schedule>  senna 2 Tablet(s) Oral at bedtime    MEDICATIONS  (PRN):  AQUAPHOR (petrolatum Ointment) 1 Application(s) Topical four times a day PRN Chapped Lips

## 2024-03-07 NOTE — DISCHARGE NOTE NURSING/CASE MANAGEMENT/SOCIAL WORK - NSDCPEFALRISK_GEN_ALL_CORE
For information on Fall & Injury Prevention, visit: [email or web address] OR  [email or web address] OR  https://www.cdc.gov/steadi/patient.html

## 2024-03-07 NOTE — BH CONSULTATION LIAISON PROGRESS NOTE - NSBHCONSFOLLOWNEEDS_PSY_ALL_CORE
Needs further psych safety assessment prior to discharge
No psychiatric contraindications to discharge
Needs further psych safety assessment prior to discharge
Needs further psych safety assessment prior to discharge

## 2024-03-07 NOTE — BH CONSULTATION LIAISON PROGRESS NOTE - NSBHATTESTTYPEVISIT_PSY_A_CORE
Attending Only
Attending with Resident/Fellow/Student

## 2024-03-07 NOTE — PROGRESS NOTE ADULT - PROBLEM SELECTOR PLAN 6
- Hx of HTN, but does not appear to be on anti-hypertensives on med rec (personally reviewed).   - BP initially in 170s/90s on admission, possibility that BP is elevated because of agitation/stress  - Will initiate amlodipine 5
- Hx of recent cataract surgery on 1/20/24 with left eye erythema on exam. Hx bilateral cataract surgery in the further past  - Per [facility] med rec, has completed Ketorolac eye drops and Ofloxacin eyedrops  - Was also prescribed Prednisolone acetate 1% drops, unclear if completed. Initial script was for 30 days starting on 1/20.   - C/w 1-2 days of Prednisolone drops based on med rec.  - Ophtho recs appreciated
- Hx of HTN, but does not appear to be on anti-hypertensives on med rec (personally reviewed).   - BP initially in 170s/90s on admission, possibility that BP is elevated because of agitation/stress  - c/w amlodipine 5mg daily

## 2024-03-07 NOTE — BH CONSULTATION LIAISON PROGRESS NOTE - NSBHATTESTBILLING_PSY_A_CORE
83734-Jnxyqhkqar OBS or IP - moderate complexity OR 35-49 mins
[id number]-Pmnclzzihs OBS or IP - moderate complexity OR 35-49 mins
[id number]-Mqwswknpqk OBS or IP - moderate complexity OR 35-49 mins
[id number]-Happwspkvf OBS or IP - moderate complexity OR 35-49 mins
[id number]-Joaxqqzbfq OBS or IP - moderate complexity OR 35-49 mins
[id number]-Akdwetfawt OBS or IP - moderate complexity OR 35-49 mins
[id number]-Vwykgwjytw OBS or IP - moderate complexity OR 35-49 mins
[id number]-Cijogmsozj OBS or IP - moderate complexity OR 35-49 mins
[id number]-Kbicrcrvpp OBS or IP - moderate complexity OR 35-49 mins
[id number]-Qrykkedbyq OBS or IP - moderate complexity OR 35-49 mins
[id number]-Jhuxzhhpxe OBS or IP - moderate complexity OR 35-49 mins

## 2024-03-07 NOTE — PROGRESS NOTE ADULT - PROBLEM SELECTOR PROBLEM 1
Acute encephalopathy

## 2024-03-07 NOTE — BH CONSULTATION LIAISON PROGRESS NOTE - NSBHFUPINTERVALHXFT_PSY_A_CORE
Chart reviewed. No PRNs in many days. Tolerating meds. On exam, he is calm, awake/alert, oriented to self only (baseline). Pleasant, not agitated/impulsive. says he is "ready to play tennis" and denies any complaints. Denies depressed/anxious mood, SI, HI, AVH, paranoia/safety concerns (but also has poor insight). Exam nonfocal  Per aid who knows him well, she corroborates he has improved in recent days with very little agitation if any. Feels he might be suitable for return to [facility] with 24/7 aids. RNs on floor corroborate that he has been compliant with care/showers.

## 2024-03-07 NOTE — PROGRESS NOTE ADULT - PROBLEM SELECTOR PLAN 5
- Hx of HTN, but does not appear to be on anti-hypertensives on med rec  - c/w amlodipine 5mg daily
- Very dry oral mucosa.  Lab-work appears hemoconcentrated  - likelihood that patient has not been drinking fluids, in the context of mental status changes  -s/p IVF  -resolved
- Hx of HTN, but does not appear to be on anti-hypertensives on med rec  - c/w amlodipine 5mg daily
- Hx of HTN, but does not appear to be on anti-hypertensives on med rec (personally reviewed).   - BP initially in 170s/90s on admission, possibility that BP is elevated because of agitation/stress  - c/w amlodipine 5mg daily
- Very dry oral mucosa.  Lab-work appears hemoconcentrated  - likelihood that patient has not been drinking fluids, in the context of mental status changes  -s/p IVF  -resolved
- Hx of HTN, but does not appear to be on anti-hypertensives on med rec  - c/w amlodipine 5mg daily
- Very dry oral mucosa.  Lab-work appears hemoconcentrated  - likelihood that patient has not been drinking fluids, in the context of mental status changes  - C/w IVF  - Encourage oral hydration, as tolerated  - Monitor urine output
- Hx of HTN, but does not appear to be on anti-hypertensives on med rec  - c/w amlodipine 5mg daily
- Very dry oral mucosa.  Lab-work appears hemoconcentrated  - likelihood that patient has not been drinking fluids, in the context of mental status changes  -s/p IVF  -resolved
- Hx of HTN, but does not appear to be on anti-hypertensives on med rec  - c/w amlodipine 5mg daily
- Hx of HTN, but does not appear to be on anti-hypertensives on med rec (personally reviewed).   - BP initially in 170s/90s on admission, possibility that BP is elevated because of agitation/stress  - c/w amlodipine 5mg daily

## 2024-03-07 NOTE — BH CONSULTATION LIAISON PROGRESS NOTE - NSBHFUPINTERVALCCFT_PSY_A_CORE
"I'm hungry"
"My nose is stuffy"
"I got laid last night"
"I'm doing ok"
"your mother and your brother and your sister!"
"I'm OK"
"I'm OK"
"maybe I can get some sex from you?"
"I feel f*cking great!"
"I'm great"
"I'm fine"

## 2024-03-07 NOTE — BH CONSULTATION LIAISON PROGRESS NOTE - NSBHFUPREASONCONS_PSY_A_CORE
dementia
dementia/agitation
dementia/agitation
agitation
agitation
dementia/agitation
dementia
agitation

## 2024-03-07 NOTE — PROGRESS NOTE ADULT - REASON FOR ADMISSION
Acute encephalopathy, Agitation

## 2024-03-07 NOTE — DISCHARGE NOTE NURSING/CASE MANAGEMENT/SOCIAL WORK - PATIENT PORTAL LINK FT
You can access the FollowMyHealth Patient Portal offered by [facility] by registering at the following website: [email or web address]. By joining [employer]’s FollowMyHealth portal, you will also be able to view your health information using other applications (apps) compatible with our system.

## 2024-03-07 NOTE — PROGRESS NOTE ADULT - PROBLEM SELECTOR PROBLEM 6
Cataract
Essential hypertension
Cataract
Essential hypertension
Cataract
Essential hypertension
Essential hypertension

## 2024-03-07 NOTE — BH CONSULTATION LIAISON PROGRESS NOTE - NSBHCHARTREVIEWVS_PSY_A_CORE FT
Unfortunately, I am not clearing you today since you are currently under podiatry care and have foot/ankle pain of both legs made worse by walking/running  Both football and wrestling would likely make this tendonitis pain worse  After podiatry clears you, follow-up with your family provider Dr Blanca Solorzano for clearance 
Vital Signs Last 24 Hrs  T(C): 36.4 (23 Feb 2024 04:09), Max: 36.8 (22 Feb 2024 20:45)  T(F): 97.5 (23 Feb 2024 04:09), Max: 98.2 (22 Feb 2024 20:45)  HR: 65 (23 Feb 2024 04:09) (65 - 82)  BP: 156/90 (23 Feb 2024 04:09) (140/82 - 156/90)  BP(mean): --  RR: 18 (23 Feb 2024 04:09) (17 - 18)  SpO2: 98% (23 Feb 2024 04:09) (98% - 98%)    Parameters below as of 23 Feb 2024 04:09  Patient On (Oxygen Delivery Method): room air    
Vital Signs Last 24 Hrs  T(C): 36.5 (07 Mar 2024 11:15), Max: 36.9 (06 Mar 2024 20:22)  T(F): 97.7 (07 Mar 2024 11:15), Max: 98.4 (06 Mar 2024 20:22)  HR: 66 (07 Mar 2024 11:15) (66 - 66)  BP: 136/60 (07 Mar 2024 11:15) (131/63 - 144/65)  BP(mean): --  RR: 18 (07 Mar 2024 11:15) (18 - 18)  SpO2: 99% (07 Mar 2024 11:15) (99% - 100%)    Parameters below as of 07 Mar 2024 11:15  Patient On (Oxygen Delivery Method): room air    
Vital Signs Last 24 Hrs  T(C): 36.4 (26 Feb 2024 11:57), Max: 36.8 (25 Feb 2024 13:40)  T(F): 97.6 (26 Feb 2024 11:57), Max: 98.2 (25 Feb 2024 13:40)  HR: 75 (26 Feb 2024 11:57) (66 - 94)  BP: 155/66 (26 Feb 2024 11:57) (133/79 - 155/66)  BP(mean): --  RR: 18 (26 Feb 2024 11:57) (18 - 18)  SpO2: 99% (26 Feb 2024 11:57) (99% - 100%)    Parameters below as of 26 Feb 2024 11:57  Patient On (Oxygen Delivery Method): room air    
Vital Signs Last 24 Hrs  T(C): 36.6 (29 Feb 2024 06:02), Max: 37 (28 Feb 2024 20:40)  T(F): 97.8 (29 Feb 2024 06:02), Max: 98.6 (28 Feb 2024 20:40)  HR: 100 (29 Feb 2024 06:02) (70 - 100)  BP: 125/90 (29 Feb 2024 06:02) (112/65 - 140/83)  BP(mean): --  RR: 18 (29 Feb 2024 06:02) (17 - 18)  SpO2: 100% (29 Feb 2024 06:02) (98% - 100%)    Parameters below as of 29 Feb 2024 06:02  Patient On (Oxygen Delivery Method): room air    
Vital Signs Last 24 Hrs  T(C): 36.4 (21 Feb 2024 10:47), Max: 36.4 (20 Feb 2024 20:36)  T(F): 97.6 (21 Feb 2024 10:47), Max: 97.6 (20 Feb 2024 20:36)  HR: 66 (21 Feb 2024 10:47) (61 - 84)  BP: 102/73 (21 Feb 2024 10:47) (102/73 - 153/87)  BP(mean): --  RR: 18 (21 Feb 2024 10:47) (17 - 18)  SpO2: 99% (21 Feb 2024 10:47) (99% - 100%)    Parameters below as of 21 Feb 2024 10:47  Patient On (Oxygen Delivery Method): room air    
Vital Signs Last 24 Hrs  T(C): 36.9 (28 Feb 2024 05:00), Max: 36.9 (28 Feb 2024 05:00)  T(F): 98.4 (28 Feb 2024 05:00), Max: 98.4 (28 Feb 2024 05:00)  HR: 81 (28 Feb 2024 05:00) (81 - 94)  BP: 142/84 (28 Feb 2024 05:00) (142/84 - 165/90)  BP(mean): --  RR: 18 (28 Feb 2024 05:00) (17 - 18)  SpO2: 100% (28 Feb 2024 05:00) (98% - 100%)    Parameters below as of 28 Feb 2024 05:00  Patient On (Oxygen Delivery Method): room air    
Vital Signs Last 24 Hrs  T(C): 36.4 (22 Feb 2024 05:01), Max: 36.4 (21 Feb 2024 10:47)  T(F): 97.6 (22 Feb 2024 05:01), Max: 97.6 (21 Feb 2024 10:47)  HR: 65 (22 Feb 2024 05:01) (65 - 75)  BP: 139/87 (22 Feb 2024 05:01) (102/73 - 147/87)  BP(mean): --  RR: 17 (22 Feb 2024 05:01) (17 - 18)  SpO2: 98% (22 Feb 2024 05:01) (98% - 100%)    Parameters below as of 22 Feb 2024 05:01  Patient On (Oxygen Delivery Method): room air    
Vital Signs Last 24 Hrs  T(C): 36.7 (27 Feb 2024 05:00), Max: 36.7 (27 Feb 2024 05:00)  T(F): 98.1 (27 Feb 2024 05:00), Max: 98.1 (27 Feb 2024 05:00)  HR: 78 (27 Feb 2024 05:00) (75 - 80)  BP: 140/73 (27 Feb 2024 05:00) (140/73 - 155/66)  BP(mean): --  RR: 17 (27 Feb 2024 05:00) (17 - 18)  SpO2: 100% (27 Feb 2024 05:00) (99% - 100%)    Parameters below as of 27 Feb 2024 05:00  Patient On (Oxygen Delivery Method): room air    
Vital Signs Last 24 Hrs  T(C): 36.9 (01 Mar 2024 11:27), Max: 36.9 (01 Mar 2024 05:00)  T(F): 98.5 (01 Mar 2024 11:27), Max: 98.5 (01 Mar 2024 11:27)  HR: 69 (01 Mar 2024 11:27) (60 - 101)  BP: 150/80 (01 Mar 2024 11:27) (120/76 - 150/80)  BP(mean): --  RR: 17 (01 Mar 2024 11:27) (17 - 18)  SpO2: 100% (01 Mar 2024 11:27) (100% - 100%)    Parameters below as of 01 Mar 2024 11:27  Patient On (Oxygen Delivery Method): room air    
Vital Signs Last 24 Hrs  T(C): 36.6 (05 Mar 2024 05:50), Max: 36.7 (04 Mar 2024 11:51)  T(F): 97.8 (05 Mar 2024 05:50), Max: 98.1 (04 Mar 2024 11:51)  HR: 69 (05 Mar 2024 05:50) (61 - 69)  BP: 120/60 (05 Mar 2024 05:50) (120/60 - 130/80)  BP(mean): --  RR: 18 (05 Mar 2024 05:50) (16 - 18)  SpO2: 100% (05 Mar 2024 05:50) (100% - 100%)    Parameters below as of 05 Mar 2024 05:50  Patient On (Oxygen Delivery Method): room air    
Vital Signs Last 24 Hrs  T(C): 36.6 (04 Mar 2024 05:25), Max: 37.1 (03 Mar 2024 22:14)  T(F): 97.9 (04 Mar 2024 05:25), Max: 98.8 (03 Mar 2024 22:14)  HR: 62 (04 Mar 2024 05:25) (62 - 84)  BP: 124/65 (04 Mar 2024 05:25) (124/65 - 150/70)  BP(mean): --  RR: 18 (04 Mar 2024 05:25) (17 - 18)  SpO2: 100% (04 Mar 2024 05:25) (100% - 100%)    Parameters below as of 04 Mar 2024 05:25  Patient On (Oxygen Delivery Method): room air

## 2024-03-07 NOTE — PROGRESS NOTE ADULT - PROBLEM SELECTOR PLAN 1
- P/w insomnia and aggression which is a significant change from baseline. Symptoms are most likely due to worsening dementia  - UA unremarkable, Ucx negative, CT Head negative, patient afebrile without leukocytosis. Low suspicion for acute infection to be triggering symptoms  - Low suspicion for toxic causes of AMS; patient not on anticholinergics or benzos based on med rec  - Having normal urination and BM  - QTc on admission - 420  - TSH 2.80  -  B12, Folate, vitamin D: wnl, RPR negative  - Psych evaluated: increased seroquel to 100mg, and recommending increasing depakote to 250mg TID standing, dc celexa, c/w memantine, and melatonin. Lacks capacity to leave AMA. Follow up additional psychiatry recs
- P/w insomnia and aggression which is a significant change from baseline. Symptoms are most likely due to worsening dementia, less likely delirium 2/2 primary medical issues, insomnia, dehydration. Not acutely agitated currently.   - UA unremarkable, Ucx negative, CT Head negative, patient afebrile without leukocytosis. Low suspicion for acute infection to be triggering symptoms  - Low suspicion for toxic causes of AMS; patient not on anticholinergics or benzos based on med rec  - Having normal urination and BM, per Aide and patient  - QTc on admission - 420  - TSH 2.80  - Encourage PO intake, IVF as needed  -  B12, Folate, vitamin D: wnl, RPR negative  - c/w home Memantine 5mg qD and Seroquel 25mg qD. Zyprexa 2.5mg q6h PRN for agitation  - Ophtho recs appreciated - Lacrilube and artificial tears in left eye  - Psych evaluated: increase seroquel to 70mg, started celexa
- P/w insomnia and aggression which is a significant change from baseline. Symptoms are most likely due to worsening dementia  - UA unremarkable, Ucx negative, CT Head negative, patient afebrile without leukocytosis. Low suspicion for acute infection to be triggering symptoms  - Low suspicion for toxic causes of AMS; patient not on anticholinergics or benzos based on med rec  - Having normal urination and BM  - QTc on admission - 420  - TSH 2.80  -  B12, Folate, vitamin D: wnl, RPR negative  - Psych evaluated: increased seroquel to 100mg, started celexa- continue  -f.u psych recs
- P/w insomnia and aggression which is a significant change from baseline. Symptoms are most likely due to worsening dementia  - UA unremarkable, Ucx negative, CT Head negative, patient afebrile without leukocytosis. Low suspicion for acute infection to be triggering symptoms  - Low suspicion for toxic causes of AMS; patient not on anticholinergics or benzos based on med rec  - Having normal urination and BM  - QTc on admission - 420  - TSH 2.80  -  B12, Folate, vitamin D: wnl, RPR negative  - Psych evaluated: increased seroquel to 100mg, started celexa- continue  -f.u psych recs
- P/w insomnia and aggression which is a significant change from baseline. Symptoms are most likely due to worsening dementia  - UA unremarkable, Ucx negative, CT Head negative, patient afebrile without leukocytosis. Low suspicion for acute infection to be triggering symptoms  - Low suspicion for toxic causes of AMS; patient not on anticholinergics or benzos based on med rec  - Having normal urination and BM  - QTc on admission - 420  - TSH 2.80  -  B12, Folate, vitamin D: wnl, RPR negative  - Psych evaluated: increased seroquel to 100mg, and recommending increasing depakote to 250mg TID standing, dc celexa, c/w memantine, and melatonin. Lacks capacity to leave AMA. Follow up additional psychiatry recs
- P/w insomnia and aggression which is a significant change from baseline. Symptoms are most likely due to worsening dementia  - UA unremarkable, Ucx negative, CT Head negative, patient afebrile without leukocytosis. Low suspicion for acute infection to be triggering symptoms  - Low suspicion for toxic causes of AMS; patient not on anticholinergics or benzos based on med rec  - Having normal urination and BM  - QTc on admission - 420  - TSH 2.80  -  B12, Folate, vitamin D: wnl, RPR negative  - Psych evaluated: increased seroquel to 100mg, started celexa- continue  -f.u psych recs
- P/w insomnia and aggression which is a significant change from baseline. Symptoms are most likely due to worsening dementia  - UA unremarkable, Ucx negative, CT Head negative, patient afebrile without leukocytosis. Low suspicion for acute infection to be triggering symptoms  - Low suspicion for toxic causes of AMS; patient not on anticholinergics or benzos based on med rec  - Having normal urination and BM  - QTc on admission - 420  - TSH 2.80  -  B12, Folate, vitamin D: wnl, RPR negative  - Psych evaluated: increased seroquel to 100mg, started celexa- continue  -f.u psych recs
- P/w insomnia and aggression which is a significant change from baseline. Symptoms are most likely due to worsening dementia, less likely delirium 2/2 primary medical issues, insomnia, dehydration. Not acutely agitated currently.   - UA unremarkable, Ucx negative, CT Head negative, patient afebrile without leukocytosis. Low suspicion for acute infection to be triggering symptoms  - Low suspicion for toxic causes of AMS; patient not on anticholinergics or benzos based on med rec  - Having normal urination and BM, per Aide and patient  - QTc on admission - 420  - TSH 2.80  - Encourage PO intake, IVF as needed  -  B12, Folate, vitamin D: wnl, RPR negative  - c/w home Memantine 5mg qD and Seroquel 25mg qD. Zyprexa 2.5mg q6h PRN for agitation  - Ophtho recs appreciated - Lacrilube and artificial tears in left eye  - Psych evaluated: increase seroquel to 50mg, start celexa
- P/w insomnia and aggression which is a significant change from baseline. Symptoms are most likely due to worsening dementia, less likely delirium 2/2 primary medical issues, insomnia, dehydration. Not acutely agitated currently.   - UA unremarkable, Ucx negative, CT Head negative, patient afebrile without leukocytosis. Low suspicion for acute infection to be triggering symptoms  - Low suspicion for toxic causes of AMS; patient not on anticholinergics or benzos based on med rec  - Having normal urination and BM, per Aide and patient  - QTc on admission - 420  - TSH 2.80  - Encourage PO intake, IVF as needed  -  B12, Folate, vitamin D: wnl, RPR negative  - c/w home Memantine 5mg qD and Seroquel 25mg qD. Zyprexa 2.5mg q6h PRN for agitation  - Ophtho recs appreciated - Lacrilube and artificial tears in left eye  - Psych evaluated: increased seroquel to 50mg, started celexa
- P/w insomnia and aggression which is a significant change from baseline. Symptoms are most likely due to worsening dementia  - UA unremarkable, Ucx negative, CT Head negative, patient afebrile without leukocytosis. Low suspicion for acute infection to be triggering symptoms  - Low suspicion for toxic causes of AMS; patient not on anticholinergics or benzos based on med rec  - Having normal urination and BM  - QTc on admission - 420  - TSH 2.80  -  B12, Folate, vitamin D: wnl, RPR negative  - Psych evaluated: increased seroquel to 100mg, and recommending increasing depakote to 250mg TID standing, dc celexa, c/w memantine, and melatonin. Lacks capacity to leave AMA. Follow up additional psychiatry recs, pending placement
- P/w insomnia and aggression which is a significant change from baseline. Symptoms are most likely due to worsening dementia  - UA unremarkable, Ucx negative, CT Head negative, patient afebrile without leukocytosis. Low suspicion for acute infection to be triggering symptoms  - Low suspicion for toxic causes of AMS; patient not on anticholinergics or benzos based on med rec  - Having normal urination and BM  - QTc on admission - 420  - TSH 2.80  -  B12, Folate, vitamin D: wnl, RPR negative  - Psych evaluated: increased seroquel to 100mg, started celexa- continue  -f.u psych recs
- P/w insomnia and aggression which is a significant change from baseline. Symptoms are most likely due to worsening dementia, less likely delirium 2/2 primary medical issues, insomnia, dehydration. Not acutely agitated currently.   - UA unremarkable, Ucx negative, CT Head negative, patient afebrile without leukocytosis. Low suspicion for acute infection to be triggering symptoms  - Low suspicion for toxic causes of AMS; patient not on anticholinergics or benzos based on med rec  - Having normal urination and BM, per Aide and patient  - QTc on admission - 420  - TSH 2.80  - Encourage PO intake, IVF as needed  -  B12, Folate, vitamin D: wnl, RPR negative  - c/w home Memantine 5mg qD and Seroquel 25mg qD. Zyprexa 2.5mg q6h PRN for agitation  - Ophtho recs appreciated - Lacrilube and artificial tears in left eye  - Psych evaluated: increased seroquel to 75mg, started celexa- continue
- P/w insomnia and aggression which is a significant change from baseline. Symptoms are most likely due to worsening dementia  - UA unremarkable, Ucx negative, CT Head negative, patient afebrile without leukocytosis. Low suspicion for acute infection to be triggering symptoms  - Low suspicion for toxic causes of AMS; patient not on anticholinergics or benzos based on med rec  - Having normal urination and BM  - QTc on admission - 420  - TSH 2.80  -  B12, Folate, vitamin D: wnl, RPR negative  - c/w home Memantine 5mg qD and Seroquel 25mg qD. Zyprexa 2.5mg q6h PRN for agitation  - Ophtho recs appreciated - Lacrilube and artificial tears in left eye  - Psych evaluated: increased seroquel to 75mg, started celexa- continue  -requiring PRN Zyprexa for agitation  -f.u psych recs
- P/w insomnia and aggression which is a significant change from baseline. Symptoms are most likely due to worsening dementia  - UA unremarkable, Ucx negative, CT Head negative, patient afebrile without leukocytosis. Low suspicion for acute infection to be triggering symptoms  - Low suspicion for toxic causes of AMS; patient not on anticholinergics or benzos based on med rec  - Having normal urination and BM  - QTc on admission - 420  - TSH 2.80  -  B12, Folate, vitamin D: wnl, RPR negative  - c/w home Memantine 5mg qD and Seroquel 25mg qD. Zyprexa 2.5mg q6h PRN for agitation  - Ophtho recs appreciated - Lacrilube and artificial tears in left eye  - Psych evaluated: increased seroquel to 75mg, started celexa- continue  -requiring PRN Zyprexa for agitation  -f.u psych recs
- P/w insomnia and aggression which is a significant change from baseline. Symptoms are most likely due to worsening dementia, less likely delirium 2/2 primary medical issues, insomnia, dehydration. Not acutely agitated currently.   - UA unremarkable, CT Head negative, patient afebrile without leukocytosis. Low suspicion for acute infection to be triggering symptoms  - Low suspicion for toxic causes of AMS; patient not on anticholinergics or benzos based on med rec  - Having normal urination and BM, per Aide and patient  - QTc on admission - 420  - TSH 2.80  - Monitor off antibiotics  - F/u UCx  - Encourage PO intake, IVF as needed  - F/u B12, Folate, vitamin D, RPR  - Monitor urine output  - c/w home Memantine 5mg qD and Seroquel 25mg qD. Zyprexa 2.5mg q6h PRN for agitation  - Ophtho recs appreciated - Lacrilube and artificial tears in left eye  - Psych consulted (voicemail left on answering service)
- P/w insomnia and aggression which is a significant change from baseline. Symptoms are most likely due to worsening dementia  - UA unremarkable, Ucx negative, CT Head negative, patient afebrile without leukocytosis. Low suspicion for acute infection to be triggering symptoms  - Low suspicion for toxic causes of AMS; patient not on anticholinergics or benzos based on med rec  - Having normal urination and BM  - QTc on admission - 420  - TSH 2.80  -  B12, Folate, vitamin D: wnl, RPR negative  - Psych evaluated: increased seroquel to 100mg, and recommending increasing depakote to 250mg TID standing, dc celexa, c/w memantine, and melatonin. Lacks capacity to leave AMA. Psychiatry now recommending attempt to return to senior care as improved greatly this admission, no longer advising geripsych inpatient psych admission, f/u CM/SW regarding placement
- P/w insomnia and aggression which is a significant change from baseline. Symptoms are most likely due to worsening dementia  - UA unremarkable, Ucx negative, CT Head negative, patient afebrile without leukocytosis. Low suspicion for acute infection to be triggering symptoms  - Low suspicion for toxic causes of AMS; patient not on anticholinergics or benzos based on med rec  - Having normal urination and BM  - QTc on admission - 420  - TSH 2.80  -  B12, Folate, vitamin D: wnl, RPR negative  - c/w home Memantine 5mg qD and Seroquel 25mg qD. Zyprexa 2.5mg q6h PRN for agitation  - Ophtho recs appreciated - Lacrilube and artificial tears in left eye  - Psych evaluated: increased seroquel to 75mg, started celexa- continue  -requiring PRN Zyprexa for agitation  -f.u psych recs
- P/w insomnia and aggression which is a significant change from baseline. Symptoms are most likely due to worsening dementia, less likely delirium 2/2 primary medical issues, insomnia, dehydration. Not acutely agitated currently.   - UA unremarkable, Ucx negative, CT Head negative, patient afebrile without leukocytosis. Low suspicion for acute infection to be triggering symptoms  - Low suspicion for toxic causes of AMS; patient not on anticholinergics or benzos based on med rec  - Having normal urination and BM, per Aide and patient  - QTc on admission - 420  - TSH 2.80  - Encourage PO intake, IVF as needed  -  B12, Folate, vitamin D: wnl, RPR negative  - c/w home Memantine 5mg qD and Seroquel 25mg qD. Zyprexa 2.5mg q6h PRN for agitation  - Ophtho recs appreciated - Lacrilube and artificial tears in left eye  - Psych evaluated: increased seroquel to 75mg, started celexa- continue

## 2024-03-07 NOTE — PROGRESS NOTE ADULT - PROBLEM SELECTOR PROBLEM 5
Essential hypertension
Dehydration
Essential hypertension
Essential hypertension
Dehydration
Essential hypertension
Dehydration
Essential hypertension
Dehydration
Essential hypertension
Essential hypertension

## 2024-03-07 NOTE — CHART NOTE - NSCHARTNOTEFT_GEN_A_CORE
NUTRITION FOLLOW-UP:    83M PMH dementia (?Alzheimer's), cataracts s/p cataract surgery (on 1/20/24), hypertension, and xerotic dermatitis, p/w agitation for the past 2 days with 1 week of insomnia, likely due to worsening dementia vs acute psychosis iso primary psychiatric disorder. Admitted to medicine for further work-up of agitation.     Pt f/u per protocol, remains confused, consuming 75% intake of meals with No GI distress (nausea/vomiting/diarrhea/constipation.) at present. Noted skin ecchymosis, no edema per nursing flow sheet. Dispo pending placement.     Weight: 70.7 KG (3/7/24), 70.1 KG (2/21/24)    Labs:  n/a    Current Diet: Diet, Regular (02-19-24 @ 03:38) [Active]    Skin- ecchymosis, no edema as per RN flow sheet    MEDICATIONS  (STANDING):  amLODIPine   Tablet 5 milliGRAM(s) Oral daily  artificial tears (preservative free) Ophthalmic Solution 2 Drop(s) Left EYE four times a day  chlorhexidine 2% Cloths 1 Application(s) Topical daily  divalproex  milliGRAM(s) Oral three times a day  enoxaparin Injectable 40 milliGRAM(s) SubCutaneous every 24 hours  folic acid 1 milliGRAM(s) Oral daily  melatonin 3 milliGRAM(s) Oral at bedtime  memantine 5 milliGRAM(s) Oral daily  petrolatum Ophthalmic Ointment 1 Application(s) Left EYE at bedtime  QUEtiapine 100 milliGRAM(s) Oral <User Schedule>  senna 2 Tablet(s) Oral at bedtime    Estimated needs:  No changes since previous assessment    Nutrition Diagnosis:  Ongoing    RD to Remain Available:    Additional Recommendations:   Continue with current diet as ordered.   Monitor PO intake, weight trends, nutrition related lab values, BMs/GI distress, hydration status, skin integrity.       Carrol Spencer RDN #[id number]

## 2024-03-07 NOTE — PROGRESS NOTE ADULT - ASSESSMENT
Assessment and Recommendation:   · Assessment	  83y male with a past medical history/ocular history of Dementia (?Alzheimer's), cataracts s/p cataract surgery (on 1/20/24), hypertension, and xerotic dermatitis, consulted for left eye redness, found to have subconj heme.    #subconj heme, left eye  - Per pt's son, pt had subconj heme of the left eye after cataract surgery and it has been improving  - Pt denying any vision complaints  - VA 20/20, no RAPD, IOP wnl EOM full  - On exam pt has nasal subconj heme in the left eye  - Recommend artificial tears QID and lacri-libe qhs to the left eye    #s/p cataract surgery  - continue with gtt as prescribed by his ophthalmologist ([facility])  - Pt has follow up later this month    Outpatient Follow-up: Patient should follow-up with his/her ophthalmologist or with [facility] Department of Ophthalmology within 1 week of after discharge at:    [address]  [place] [address]  [phone]    Roberto Carlos Reece MD, PGY-3  Also available on Microsoft Teams
83M PMHx dementia (?Alzheimer's), cataracts s/p cataract surgery (on 1/20/24), hypertension, and xerotic dermatitis, p/w agitation for the past 2 days with 1 week of insomnia, likely due to worsening dementia vs acute psychosis iso primary psychiatric disorder. Admitted to medicine for further work-up of agitation. 

## 2024-03-07 NOTE — PROGRESS NOTE ADULT - PROVIDER SPECIALTY LIST ADULT
Ophthalmology
Hospitalist
Internal Medicine
Hospitalist
Internal Medicine
Hospitalist
Internal Medicine
Internal Medicine
Hospitalist
Hospitalist
Internal Medicine
Hospitalist
Internal Medicine
Hospitalist
Internal Medicine

## 2024-03-07 NOTE — PROGRESS NOTE ADULT - PROBLEM SELECTOR PLAN 3
- Report of insomnia for the past ~ 2 weeks, although patient refutes this and states he sleeps 24 hours  - likelihood that this is contributing to the agitation/acute encephalopathy  - unclear if insomnia is being triggered by some other issue, for example, eye pain/discomfort, constipation, headache (since patient has dementia and may not be able to recall irritants)  - increased seroquel as above
- Report of insomnia for the past ~ 2 weeks  - increased seroquel as above
- Report of insomnia for the past ~ 2 weeks, although patient refutes this and states he sleeps 24 hours  - likelihood that this is contributing to the agitation/acute encephalopathy  - unclear if insomnia is being triggered by some other issue, for example, eye pain/discomfort, constipation, headache (since patient has dementia and may not be able to recall irritants)  - increased seroquel as above
- Report of insomnia for the past ~ 2 weeks  - increased seroquel as above
- Report of insomnia for the past ~ 2 weeks, although patient refutes this and states he sleeps 24 hours  - likelihood that this is contributing to the agitation/acute encephalopathy  - unclear if insomnia is being triggered by some other issue, for example, eye pain/discomfort, constipation, headache (since patient has dementia and may not be able to recall irritants)  - increased seroquel as above
- Report of insomnia for the past ~ 2 weeks  - increased seroquel as above
- Report of insomnia for the past ~ 2 weeks, although patient refutes this and states he sleeps 24 hours  - likelihood that this is contributing to the agitation/acute encephalopathy  - unclear if insomnia is being triggered by some other issue, for example, eye pain/discomfort, constipation, headache (since patient has dementia and may not be able to recall irritants)  - F/u vitamin levels, RPR  - C/w home medications as above  - Psych eval in AM (voicemail left)
- Report of insomnia for the past ~ 2 weeks  - increased seroquel as above
- Report of insomnia for the past ~ 2 weeks  - increased seroquel as above
- Report of insomnia for the past ~ 2 weeks, although patient refutes this and states he sleeps 24 hours  - likelihood that this is contributing to the agitation/acute encephalopathy  - unclear if insomnia is being triggered by some other issue, for example, eye pain/discomfort, constipation, headache (since patient has dementia and may not be able to recall irritants)  - increase seroquel as above
- Report of insomnia for the past ~ 2 weeks, although patient refutes this and states he sleeps 24 hours  - likelihood that this is contributing to the agitation/acute encephalopathy  - unclear if insomnia is being triggered by some other issue, for example, eye pain/discomfort, constipation, headache (since patient has dementia and may not be able to recall irritants)  - increased seroquel as above

## 2024-03-19 PROCEDURE — 99215 OFFICE O/P EST HI 40 MIN: CPT

## 2024-04-15 PROCEDURE — 99214 OFFICE O/P EST MOD 30 MIN: CPT

## 2024-05-20 PROCEDURE — 99214 OFFICE O/P EST MOD 30 MIN: CPT

## 2024-06-24 PROCEDURE — 99214 OFFICE O/P EST MOD 30 MIN: CPT

## 2024-07-22 PROCEDURE — 99215 OFFICE O/P EST HI 40 MIN: CPT

## 2024-08-22 PROCEDURE — 99214 OFFICE O/P EST MOD 30 MIN: CPT

## 2024-10-02 PROCEDURE — 99214 OFFICE O/P EST MOD 30 MIN: CPT

## 2024-11-20 PROCEDURE — 99214 OFFICE O/P EST MOD 30 MIN: CPT

## 2024-12-20 ENCOUNTER — INPATIENT (INPATIENT)
Facility: HOSPITAL | Age: 84
LOS: 3 days | Discharge: HOME HEALTH | End: 2024-12-24
Attending: STUDENT IN AN ORGANIZED HEALTH CARE EDUCATION/TRAINING PROGRAM | Admitting: STUDENT IN AN ORGANIZED HEALTH CARE EDUCATION/TRAINING PROGRAM
Payer: MEDICARE

## 2024-12-20 VITALS
RESPIRATION RATE: 19 BRPM | TEMPERATURE: 100 F | DIASTOLIC BLOOD PRESSURE: 82 MMHG | SYSTOLIC BLOOD PRESSURE: 169 MMHG | HEART RATE: 79 BPM | OXYGEN SATURATION: 96 %

## 2024-12-20 DIAGNOSIS — Z98.49 CATARACT EXTRACTION STATUS, UNSPECIFIED EYE: Chronic | ICD-10-CM

## 2024-12-20 DIAGNOSIS — Z29.9 ENCOUNTER FOR PROPHYLACTIC MEASURES, UNSPECIFIED: ICD-10-CM

## 2024-12-20 DIAGNOSIS — F03.90 UNSPECIFIED DEMENTIA, UNSPECIFIED SEVERITY, WITHOUT BEHAVIORAL DISTURBANCE, PSYCHOTIC DISTURBANCE, MOOD DISTURBANCE, AND ANXIETY: ICD-10-CM

## 2024-12-20 DIAGNOSIS — R50.9 FEVER, UNSPECIFIED: ICD-10-CM

## 2024-12-20 LAB
ADD ON TEST-SPECIMEN IN LAB: SIGNIFICANT CHANGE UP
ADD ON TEST-SPECIMEN IN LAB: SIGNIFICANT CHANGE UP
ALBUMIN SERPL ELPH-MCNC: 3.5 G/DL — SIGNIFICANT CHANGE UP (ref 3.3–5)
ALP SERPL-CCNC: 73 U/L — SIGNIFICANT CHANGE UP (ref 40–120)
ALT FLD-CCNC: 18 U/L — SIGNIFICANT CHANGE UP (ref 4–41)
ANION GAP SERPL CALC-SCNC: 10 MMOL/L — SIGNIFICANT CHANGE UP (ref 7–14)
APPEARANCE UR: CLEAR — SIGNIFICANT CHANGE UP
APTT BLD: 32.8 SEC — SIGNIFICANT CHANGE UP (ref 24.5–35.6)
AST SERPL-CCNC: 21 U/L — SIGNIFICANT CHANGE UP (ref 4–40)
BASOPHILS # BLD AUTO: 0.04 K/UL — SIGNIFICANT CHANGE UP (ref 0–0.2)
BASOPHILS NFR BLD AUTO: 0.4 % — SIGNIFICANT CHANGE UP (ref 0–2)
BILIRUB SERPL-MCNC: 0.3 MG/DL — SIGNIFICANT CHANGE UP (ref 0.2–1.2)
BILIRUB UR-MCNC: NEGATIVE — SIGNIFICANT CHANGE UP
BLOOD GAS VENOUS COMPREHENSIVE RESULT: SIGNIFICANT CHANGE UP
BUN SERPL-MCNC: 11 MG/DL — SIGNIFICANT CHANGE UP (ref 7–23)
CALCIUM SERPL-MCNC: 8.5 MG/DL — SIGNIFICANT CHANGE UP (ref 8.4–10.5)
CHLORIDE SERPL-SCNC: 104 MMOL/L — SIGNIFICANT CHANGE UP (ref 98–107)
CO2 SERPL-SCNC: 24 MMOL/L — SIGNIFICANT CHANGE UP (ref 22–31)
COLOR SPEC: YELLOW — SIGNIFICANT CHANGE UP
CREAT SERPL-MCNC: 1.1 MG/DL — SIGNIFICANT CHANGE UP (ref 0.5–1.3)
DIFF PNL FLD: NEGATIVE — SIGNIFICANT CHANGE UP
EGFR: 67 ML/MIN/1.73M2 — SIGNIFICANT CHANGE UP
EOSINOPHIL # BLD AUTO: 0.05 K/UL — SIGNIFICANT CHANGE UP (ref 0–0.5)
EOSINOPHIL NFR BLD AUTO: 0.5 % — SIGNIFICANT CHANGE UP (ref 0–6)
FLUAV AG NPH QL: SIGNIFICANT CHANGE UP
FLUBV AG NPH QL: SIGNIFICANT CHANGE UP
GLUCOSE SERPL-MCNC: 98 MG/DL — SIGNIFICANT CHANGE UP (ref 70–99)
GLUCOSE UR QL: NEGATIVE MG/DL — SIGNIFICANT CHANGE UP
HCT VFR BLD CALC: 40.8 % — SIGNIFICANT CHANGE UP (ref 39–50)
HGB BLD-MCNC: 13.2 G/DL — SIGNIFICANT CHANGE UP (ref 13–17)
IANC: 7.19 K/UL — SIGNIFICANT CHANGE UP (ref 1.8–7.4)
IMM GRANULOCYTES NFR BLD AUTO: 0.3 % — SIGNIFICANT CHANGE UP (ref 0–0.9)
INR BLD: 1.09 RATIO — SIGNIFICANT CHANGE UP (ref 0.85–1.16)
KETONES UR-MCNC: NEGATIVE MG/DL — SIGNIFICANT CHANGE UP
LEUKOCYTE ESTERASE UR-ACNC: NEGATIVE — SIGNIFICANT CHANGE UP
LYMPHOCYTES # BLD AUTO: 1.38 K/UL — SIGNIFICANT CHANGE UP (ref 1–3.3)
LYMPHOCYTES # BLD AUTO: 13.6 % — SIGNIFICANT CHANGE UP (ref 13–44)
MCHC RBC-ENTMCNC: 29.1 PG — SIGNIFICANT CHANGE UP (ref 27–34)
MCHC RBC-ENTMCNC: 32.4 G/DL — SIGNIFICANT CHANGE UP (ref 32–36)
MCV RBC AUTO: 90.1 FL — SIGNIFICANT CHANGE UP (ref 80–100)
MONOCYTES # BLD AUTO: 1.48 K/UL — HIGH (ref 0–0.9)
MONOCYTES NFR BLD AUTO: 14.6 % — HIGH (ref 2–14)
NEUTROPHILS # BLD AUTO: 7.19 K/UL — SIGNIFICANT CHANGE UP (ref 1.8–7.4)
NEUTROPHILS NFR BLD AUTO: 70.6 % — SIGNIFICANT CHANGE UP (ref 43–77)
NITRITE UR-MCNC: NEGATIVE — SIGNIFICANT CHANGE UP
NRBC # BLD: 0 /100 WBCS — SIGNIFICANT CHANGE UP (ref 0–0)
NRBC # FLD: 0 K/UL — SIGNIFICANT CHANGE UP (ref 0–0)
PH UR: 6.5 — SIGNIFICANT CHANGE UP (ref 5–8)
PLATELET # BLD AUTO: 176 K/UL — SIGNIFICANT CHANGE UP (ref 150–400)
POTASSIUM SERPL-MCNC: 4.3 MMOL/L — SIGNIFICANT CHANGE UP (ref 3.5–5.3)
POTASSIUM SERPL-SCNC: 4.3 MMOL/L — SIGNIFICANT CHANGE UP (ref 3.5–5.3)
PROT SERPL-MCNC: 6.3 G/DL — SIGNIFICANT CHANGE UP (ref 6–8.3)
PROT UR-MCNC: NEGATIVE MG/DL — SIGNIFICANT CHANGE UP
PROTHROM AB SERPL-ACNC: 13 SEC — SIGNIFICANT CHANGE UP (ref 9.9–13.4)
RBC # BLD: 4.53 M/UL — SIGNIFICANT CHANGE UP (ref 4.2–5.8)
RBC # FLD: 13.7 % — SIGNIFICANT CHANGE UP (ref 10.3–14.5)
RSV RNA NPH QL NAA+NON-PROBE: SIGNIFICANT CHANGE UP
SARS-COV-2 RNA SPEC QL NAA+PROBE: SIGNIFICANT CHANGE UP
SODIUM SERPL-SCNC: 138 MMOL/L — SIGNIFICANT CHANGE UP (ref 135–145)
SP GR SPEC: 1.01 — SIGNIFICANT CHANGE UP (ref 1–1.03)
UROBILINOGEN FLD QL: 0.2 MG/DL — SIGNIFICANT CHANGE UP (ref 0.2–1)
WBC # BLD: 10.17 K/UL — SIGNIFICANT CHANGE UP (ref 3.8–10.5)
WBC # FLD AUTO: 10.17 K/UL — SIGNIFICANT CHANGE UP (ref 3.8–10.5)

## 2024-12-20 PROCEDURE — 99285 EMERGENCY DEPT VISIT HI MDM: CPT | Mod: GC

## 2024-12-20 PROCEDURE — 71045 X-RAY EXAM CHEST 1 VIEW: CPT | Mod: 26

## 2024-12-20 PROCEDURE — 99223 1ST HOSP IP/OBS HIGH 75: CPT

## 2024-12-20 RX ORDER — GINKGO BILOBA 40 MG
3 CAPSULE ORAL AT BEDTIME
Refills: 0 | Status: DISCONTINUED | OUTPATIENT
Start: 2024-12-20 | End: 2024-12-24

## 2024-12-20 RX ORDER — ACETAMINOPHEN 80 MG/.8ML
1000 SOLUTION/ DROPS ORAL ONCE
Refills: 0 | Status: COMPLETED | OUTPATIENT
Start: 2024-12-20 | End: 2024-12-20

## 2024-12-20 RX ORDER — POLYSORBATE 80 100 MG/10ML
1 SOLUTION/ DROPS OPHTHALMIC
Refills: 0 | Status: DISCONTINUED | OUTPATIENT
Start: 2024-12-20 | End: 2024-12-24

## 2024-12-20 RX ORDER — CEFTRIAXONE SODIUM 1 G/1
1000 INJECTION, POWDER, FOR SOLUTION INTRAMUSCULAR; INTRAVENOUS ONCE
Refills: 0 | Status: COMPLETED | OUTPATIENT
Start: 2024-12-20 | End: 2024-12-20

## 2024-12-20 RX ORDER — MAG HYDROX/ALUMINUM HYD/SIMETH 200-200-20
30 SUSPENSION, ORAL (FINAL DOSE FORM) ORAL EVERY 4 HOURS
Refills: 0 | Status: DISCONTINUED | OUTPATIENT
Start: 2024-12-20 | End: 2024-12-24

## 2024-12-20 RX ORDER — ENOXAPARIN SODIUM 60 MG/.6ML
40 INJECTION INTRAVENOUS; SUBCUTANEOUS EVERY 24 HOURS
Refills: 0 | Status: DISCONTINUED | OUTPATIENT
Start: 2024-12-20 | End: 2024-12-24

## 2024-12-20 RX ORDER — SODIUM CHLORIDE 9 MG/ML
1000 INJECTION, SOLUTION INTRAMUSCULAR; INTRAVENOUS; SUBCUTANEOUS ONCE
Refills: 0 | Status: COMPLETED | OUTPATIENT
Start: 2024-12-20 | End: 2024-12-20

## 2024-12-20 RX ORDER — DONEPEZIL HYDROCHLORIDE 5 MG/1
10 TABLET, FILM COATED ORAL AT BEDTIME
Refills: 0 | Status: DISCONTINUED | OUTPATIENT
Start: 2024-12-20 | End: 2024-12-24

## 2024-12-20 RX ORDER — ACETAMINOPHEN 80 MG/.8ML
650 SOLUTION/ DROPS ORAL EVERY 6 HOURS
Refills: 0 | Status: DISCONTINUED | OUTPATIENT
Start: 2024-12-20 | End: 2024-12-24

## 2024-12-20 RX ORDER — DONEPEZIL HYDROCHLORIDE 5 MG/1
1 TABLET, FILM COATED ORAL
Refills: 0 | DISCHARGE

## 2024-12-20 RX ORDER — POLYETHYLENE GLYCOL 3350 17 G/DOSE
17 POWDER (GRAM) ORAL DAILY
Refills: 0 | Status: DISCONTINUED | OUTPATIENT
Start: 2024-12-20 | End: 2024-12-24

## 2024-12-20 RX ORDER — DIVALPROEX SODIUM 500 MG/1
250 TABLET, DELAYED RELEASE ORAL THREE TIMES A DAY
Refills: 0 | Status: DISCONTINUED | OUTPATIENT
Start: 2024-12-20 | End: 2024-12-24

## 2024-12-20 RX ORDER — SENNOSIDES 8.6 MG/1
2 TABLET, FILM COATED ORAL AT BEDTIME
Refills: 0 | Status: DISCONTINUED | OUTPATIENT
Start: 2024-12-20 | End: 2024-12-24

## 2024-12-20 RX ORDER — ONDANSETRON 4 MG/1
4 TABLET ORAL EVERY 8 HOURS
Refills: 0 | Status: DISCONTINUED | OUTPATIENT
Start: 2024-12-20 | End: 2024-12-24

## 2024-12-20 RX ORDER — HALOPERIDOL DECANOATE 50 MG/ML
2 INJECTION INTRAMUSCULAR EVERY 8 HOURS
Refills: 0 | Status: DISCONTINUED | OUTPATIENT
Start: 2024-12-20 | End: 2024-12-22

## 2024-12-20 RX ORDER — QUETIAPINE FUMARATE 100 MG/1
100 TABLET, FILM COATED ORAL AT BEDTIME
Refills: 0 | Status: DISCONTINUED | OUTPATIENT
Start: 2024-12-20 | End: 2024-12-24

## 2024-12-20 RX ADMIN — CEFTRIAXONE SODIUM 100 MILLIGRAM(S): 1 INJECTION, POWDER, FOR SOLUTION INTRAMUSCULAR; INTRAVENOUS at 22:25

## 2024-12-20 RX ADMIN — ACETAMINOPHEN 400 MILLIGRAM(S): 80 SOLUTION/ DROPS ORAL at 19:49

## 2024-12-20 RX ADMIN — SODIUM CHLORIDE 1000 MILLILITER(S): 9 INJECTION, SOLUTION INTRAMUSCULAR; INTRAVENOUS; SUBCUTANEOUS at 20:07

## 2024-12-20 NOTE — ED ADULT TRIAGE NOTE - CHIEF COMPLAINT QUOTE
Pt arrives via EMS from the [facility] for weakness, unable to get up from his bed at 15:00. Baseline ambulatory w/ walker. RR even/unlabored. Arrives with IV to R wrist, NS infusing. Bilat weakness noted on PHx: dementia, HTN, cataracts. Stroke eval by MD Hallman.

## 2024-12-20 NOTE — H&P ADULT - TIME BILLING
Reviewed admission imaging reports, and admission labs prior to the encounter with  the patient   Reviewed Triage and ED course/ documentation   Performed full physical exam and ROS  Obtained list of home medications and performed home medications reconciliation on EMR  Discussed prognosis, treatment and further w/up with the family member including::: Regino Ruth, via phone   Ordered or reviewed new admission medications and placed or reviewed all hospitalization admission orders  Managed (continued, held or adjusted doses) home medications   Ordered  or reviewed orders of  new imaging and new lab w/up  Requested new consultations including::: Consider Geriatric Psychiatry

## 2024-12-20 NOTE — H&P ADULT - NSHPLABSRESULTS_GEN_ALL_CORE
.    -Personally INTERPRETED EKG: NSR, 79bpm, qtc 415, no acute Tw or ST changes, 1st deg AVB, no PACs or PVCs - unchanged compared to 2/18/2024 EKG    -Personally INTERPRETED CXR: no consolidations, no PTX, no pleural effusions     -Personally reviewed the following labs below:                        13.2   10.17 )-----------( 176      ( 20 Dec 2024 19:45 )             40.8     19:45 - VBG - pH: 7.39  | pCO2: 42    | pO2: 44    | Lactate: 1.3        SARS-CoV-2 Result: NotDetec (12-20-24 @ 20:07)  Influenza A Result: NotDetec (12-20-24 @ 20:07)  Influenza B Result: NotDetec (12-20-24 @ 20:07)  Resp Syn Virus Result: NotDetec (12-20-24 @ 20:07)    12-20    138  |  104  |  11  ----------------------------<  98  4.3   |  24  |  1.10    Ca    8.5      20 Dec 2024 19:45    TPro  6.3  /  Alb  3.5  /  TBili  0.3  /  DBili  x   /  AST  21  /  ALT  18  /  AlkPhos  73  12-20        -Personally reviewed: CT BRAIN ORDERED BY: CHARY PITTMAN    PROCEDURE DATE: 02/18/2024  COMPARISON: No prior imaging available for comparison.  FINDINGS:  No acute intracranial hemorrhage, mass effect, hydrocephalus, or midline shift. No extra-axial collections.  Sulcal and ventricular prominence consistent with age appropriate involutional change. Periventricular and subcortical white matter hypodensities consistent with mild microvascular changes.  The visualized paranasal sinuses are clear. Small effusion within the left mastoid air cells. Bilateral ocular lens replacements.  The calvarium is intact.  IMPRESSION:  No acute intracranial hemorrhage, mass effect, or midline shift.

## 2024-12-20 NOTE — ED PROVIDER NOTE - ATTENDING CONTRIBUTION TO CARE
85 yo male with PMH dementia, HTN sent to ED from [facility] for generalized weakness PE: Well appearing, RRR, CTA BL lungs, abd soft NTND. A/P Labs, imaging, medicate, reassess

## 2024-12-20 NOTE — ED ADULT NURSE REASSESSMENT NOTE - NS ED NURSE REASSESS COMMENT FT1
Report received from DAGO Younger. Pt resting in stretcher, A&O x 3, reports no pain at this time, unsure of reason for being at hospital, VS as noted, IVF infusing, XR at bedside, safety maintained, comfort provided, care plan ongoing.

## 2024-12-20 NOTE — H&P ADULT - NSHPSOCIALHISTORY_GEN_ALL_CORE
Resident  of [facility]   Ambulatory with a walker   [facility]   Ambulatory with a walker  Requires assistance with ADL   Has HHA services

## 2024-12-20 NOTE — ED PROVIDER NOTE - OBJECTIVE STATEMENT
84-year-old Male with a history of dementia, HTN, who was brought from [facility] for generalized weakness since 1500 this afternoon. Per aide, the patient was complaining of congestion. He was also noted to have difficulty walking to the bathroom due to generalized weakness. Ambulates with a walker at baseline. In the ED, the patient states everyone is lying. Denies pain at this time.

## 2024-12-20 NOTE — ED ADULT NURSE NOTE - OBJECTIVE STATEMENT
pt to room 23, sent by [facility] for weakness and acute change in mental status. pt with aide at bedside who states pt at baseline is a&ox4 and ambulates with walker  but today around 3pm pt was unable to get up from bed - reports pt was at baseline earlier today. pt arrives awake and alert, oriented only to self at this time. orally febrile to 101.6, skin hot to touch, dry. pt does not appear to be in any pain at this time, NSR on cardiac monitor. respirations even and nonlabored on room air. aide at bedside. comfort and safety maintained. pending MD kaplan.

## 2024-12-20 NOTE — H&P ADULT - NSHPPHYSICALEXAM_GEN_ALL_CORE
Vital Signs Last 24 Hrs  T(C): 37.5 (20 Dec 2024 21:03), Max: 38.7 (20 Dec 2024 18:45)  T(F): 99.5 (20 Dec 2024 21:03), Max: 101.6 (20 Dec 2024 18:45)  HR: 77 (20 Dec 2024 21:03) (77 - 79)  BP: 151/91 (20 Dec 2024 21:03) (144/89 - 169/82)  BP(mean): --  RR: 18 (20 Dec 2024 21:03) (16 - 19)  SpO2: 97% (20 Dec 2024 21:03) (96% - 98%)    Parameters below as of 20 Dec 2024 21:03  Patient On (Oxygen Delivery Method): room air

## 2024-12-20 NOTE — ED PROVIDER NOTE - PROGRESS NOTE DETAILS
Labs showed no leukocytosis or severe anemia. No evidence of severe electrolyte abnormalities, liver disease, or renal disorder. Unremarkable lipase. troponin 30. Lactate 1.3. Negative viral.

## 2024-12-20 NOTE — ED PROVIDER NOTE - CLINICAL SUMMARY MEDICAL DECISION MAKING FREE TEXT BOX
84-year-old Male with a history of HTN and dementia, who presents with generalized weakness since 1500 this afternoon. Patient complained of congestion. Noted to have difficulties standing/ambulating to bathroom this afternoon. Febrile in the ED. Sepsis workup ordered.

## 2024-12-20 NOTE — H&P ADULT - HISTORY OF PRESENT ILLNESS
83 yo male with mhx of Dementia (?Alzheimer's), cataracts s/p cataract surgery (on 1/20/24), hypertension, xerotic dermatitis, h/o agitation and psychosis on prior admission   85 yo male ambulatory with a walker at baseline with MHx of dementia (?Alzheimer's), cataracts s/p cataract surgery (on 1/20/24), hypertension, xerotic dermatitis, h/o agitation and psychosis on prior admission; Pt arrived  from [facility] for generalized weakness since 1500 on 12/2024. Per aide at the bedside, the patient was complaining of congestion and discomfort on urination. He was also noted to have difficulty walking to the bathroom due to generalized weakness.  The patient states that does not know why is in ER, reported stated that everyone was lying. Reports no CP, cough, n/v/d, Abd pain, HA, leg or arm pain  or eye pain/ ear pain. 85 yo male ambulatory with a walker at baseline with MHx of dementia (?Alzheimer's), cataracts s/p cataract surgery (on 1/20/24), hypertension, xerotic dermatitis, h/o agitation and psychosis on prior admission; Pt arrived  from [facility] for generalized weakness since 1500 on 12/2024. Per aide at the bedside, the patient was complaining of congestion and discomfort on urination. He was also noted to have difficulty walking to the bathroom due to generalized weakness.  The patient states that does not know why is in ER, reported stated that everyone was lying. Reports no CP, cough, n/v/d, Abd pain, HA, leg or arm pain  or eye pain/ ear pain. Per transfer documents, pt on regular diet at the facility.

## 2024-12-20 NOTE — ED ADULT NURSE NOTE - CHIEF COMPLAINT QUOTE
Quality 111:Pneumonia Vaccination Status For Older Adults: Pneumococcal Vaccination Previously Received
Detail Level: Detailed
Quality 110: Preventive Care And Screening: Influenza Immunization: Influenza Immunization not Administered for Documented Reasons.
Pt arrives via EMS from the [facility] for weakness, unable to get up from his bed at 15:00. Baseline ambulatory w/ walker. RR even/unlabored. Arrives with IV to R wrist, NS infusing. Bilat weakness noted on PHx: dementia, HTN, cataracts. Stroke eval by MD Hallman.

## 2024-12-20 NOTE — H&P ADULT - ASSESSMENT
83 yo male ambulatory with a walker at baseline with MHx of dementia (?Alzheimer's), cataracts s/p cataract surgery (on 1/20/24), hypertension, xerotic dermatitis, h/o agitation and psychosis on prior admission a/w generalized weakness 83 yo male ambulatory with a walker at baseline with MHx of dementia (?Alzheimer's), cataracts s/p cataract surgery (on 1/20/24), hypertension, xerotic dermatitis, h/o agitation and psychosis on prior admission a/w generalized weakness, fever, dysuria and episodes of paranoia iso dementia.

## 2024-12-20 NOTE — H&P ADULT - MENTAL STATUS
not lethargic, slightly slowed and confused, follows only simple one-step commands, answers only simple questions

## 2024-12-20 NOTE — H&P ADULT - PROBLEM SELECTOR PLAN 2
Unknown source; r/o occult Bacteremia   CXR; no consolidations   UA not c/w UTI  Expanded RVP to full: negative    -Hold Abx for now   -f/u BCx  -VS q6h   -Re-culture if spike fever again  -Consider empiric Abx if spikes again

## 2024-12-20 NOTE — H&P ADULT - PROBLEM SELECTOR PLAN 3
Calm but occasionally paranoid as per ED documentation;  EKG: QTc wnl    -Fall precautions, HOB elev, conservative consistency diet   -c/w Depakote, Seroquel   -f/u VA level in AM  -Started Haldol IM 2mg PRN agitation while inpt  -Consider Geriatric Psychiatry c/s if paranoia symptoms worsen

## 2024-12-20 NOTE — ED PROVIDER NOTE - PHYSICAL EXAMINATION
INITIAL VITAL SIGNS: Reviewed by me. (+) Febrile.   GENERAL: In no acute distress.  HEAD: Normocephalic. Atraumatic.  EYES: EOMI. PERRL.  ENT: Moist mucous membranes. Oropharynx is clear.   NECK: Supple. No meningismus.   CV: Regular rate and rhythm. No murmurs, rubs, or gallops.  RESPIRATORY: Unlabored respirations. Clear to ascultation bilaterally.  ABDOMEN: Soft, non-distended, non-tender. No guarding or rebound.  BACK: No CVA tenderness.  EXTREMITIES: No deformities. No edema.   SKIN: Warm and dry. No rashes or petechiae.  NEURO: No slurred speech. Moves upper and lower extremities.

## 2024-12-20 NOTE — H&P ADULT - PROBLEM SELECTOR PLAN 1
Multifactorial due to dementia and fever;  CXR no evidence of PNA  UA no evidence of UTI    -f/u Phsophorus, TSH,  CPK, Vit D, Pro-BNP in AM  -EKG: unchanged compared to prior   -Infectious w/up as below

## 2024-12-21 DIAGNOSIS — I10 ESSENTIAL (PRIMARY) HYPERTENSION: ICD-10-CM

## 2024-12-21 DIAGNOSIS — R53.1 WEAKNESS: ICD-10-CM

## 2024-12-21 DIAGNOSIS — J06.9 ACUTE UPPER RESPIRATORY INFECTION, UNSPECIFIED: ICD-10-CM

## 2024-12-21 DIAGNOSIS — R50.9 FEVER, UNSPECIFIED: ICD-10-CM

## 2024-12-21 DIAGNOSIS — F03.918 UNSPECIFIED DEMENTIA, UNSPECIFIED SEVERITY, WITH OTHER BEHAVIORAL DISTURBANCE: ICD-10-CM

## 2024-12-21 PROBLEM — L29.8 OTHER PRURITUS: Chronic | Status: ACTIVE | Noted: 2024-02-19

## 2024-12-21 PROBLEM — H26.9 UNSPECIFIED CATARACT: Chronic | Status: ACTIVE | Noted: 2024-02-19

## 2024-12-21 PROBLEM — F03.90 UNSPECIFIED DEMENTIA, UNSPECIFIED SEVERITY, WITHOUT BEHAVIORAL DISTURBANCE, PSYCHOTIC DISTURBANCE, MOOD DISTURBANCE, AND ANXIETY: Chronic | Status: ACTIVE | Noted: 2024-02-19

## 2024-12-21 LAB
24R-OH-CALCIDIOL SERPL-MCNC: 16.4 NG/ML — LOW (ref 30–80)
ANION GAP SERPL CALC-SCNC: 11 MMOL/L — SIGNIFICANT CHANGE UP (ref 7–14)
B PERT DNA SPEC QL NAA+PROBE: SIGNIFICANT CHANGE UP
B PERT+PARAPERT DNA PNL SPEC NAA+PROBE: SIGNIFICANT CHANGE UP
BASOPHILS # BLD AUTO: 0.07 K/UL — SIGNIFICANT CHANGE UP (ref 0–0.2)
BASOPHILS NFR BLD AUTO: 0.8 % — SIGNIFICANT CHANGE UP (ref 0–2)
BUN SERPL-MCNC: 11 MG/DL — SIGNIFICANT CHANGE UP (ref 7–23)
C PNEUM DNA SPEC QL NAA+PROBE: SIGNIFICANT CHANGE UP
CALCIUM SERPL-MCNC: 8.4 MG/DL — SIGNIFICANT CHANGE UP (ref 8.4–10.5)
CHLORIDE SERPL-SCNC: 106 MMOL/L — SIGNIFICANT CHANGE UP (ref 98–107)
CK SERPL-CCNC: 258 U/L — HIGH (ref 30–200)
CO2 SERPL-SCNC: 21 MMOL/L — LOW (ref 22–31)
CREAT SERPL-MCNC: 1.02 MG/DL — SIGNIFICANT CHANGE UP (ref 0.5–1.3)
EGFR: 73 ML/MIN/1.73M2 — SIGNIFICANT CHANGE UP
EOSINOPHIL # BLD AUTO: 0 K/UL — SIGNIFICANT CHANGE UP (ref 0–0.5)
EOSINOPHIL NFR BLD AUTO: 0 % — SIGNIFICANT CHANGE UP (ref 0–6)
FLUAV SUBTYP SPEC NAA+PROBE: SIGNIFICANT CHANGE UP
FLUBV RNA SPEC QL NAA+PROBE: SIGNIFICANT CHANGE UP
GIANT PLATELETS BLD QL SMEAR: PRESENT — SIGNIFICANT CHANGE UP
GLUCOSE SERPL-MCNC: 82 MG/DL — SIGNIFICANT CHANGE UP (ref 70–99)
HADV DNA SPEC QL NAA+PROBE: SIGNIFICANT CHANGE UP
HCOV 229E RNA SPEC QL NAA+PROBE: SIGNIFICANT CHANGE UP
HCOV HKU1 RNA SPEC QL NAA+PROBE: SIGNIFICANT CHANGE UP
HCOV NL63 RNA SPEC QL NAA+PROBE: SIGNIFICANT CHANGE UP
HCOV OC43 RNA SPEC QL NAA+PROBE: DETECTED
HCT VFR BLD CALC: 39.6 % — SIGNIFICANT CHANGE UP (ref 39–50)
HGB BLD-MCNC: 13.3 G/DL — SIGNIFICANT CHANGE UP (ref 13–17)
HMPV RNA SPEC QL NAA+PROBE: SIGNIFICANT CHANGE UP
HPIV1 RNA SPEC QL NAA+PROBE: SIGNIFICANT CHANGE UP
HPIV2 RNA SPEC QL NAA+PROBE: SIGNIFICANT CHANGE UP
HPIV3 RNA SPEC QL NAA+PROBE: SIGNIFICANT CHANGE UP
HPIV4 RNA SPEC QL NAA+PROBE: SIGNIFICANT CHANGE UP
IANC: 5.24 K/UL — SIGNIFICANT CHANGE UP (ref 1.8–7.4)
LYMPHOCYTES # BLD AUTO: 1.11 K/UL — SIGNIFICANT CHANGE UP (ref 1–3.3)
LYMPHOCYTES # BLD AUTO: 12.7 % — LOW (ref 13–44)
M PNEUMO DNA SPEC QL NAA+PROBE: SIGNIFICANT CHANGE UP
MAGNESIUM SERPL-MCNC: 2.1 MG/DL — SIGNIFICANT CHANGE UP (ref 1.6–2.6)
MCHC RBC-ENTMCNC: 30 PG — SIGNIFICANT CHANGE UP (ref 27–34)
MCHC RBC-ENTMCNC: 33.6 G/DL — SIGNIFICANT CHANGE UP (ref 32–36)
MCV RBC AUTO: 89.4 FL — SIGNIFICANT CHANGE UP (ref 80–100)
MONOCYTES # BLD AUTO: 1.86 K/UL — HIGH (ref 0–0.9)
MONOCYTES NFR BLD AUTO: 21.2 % — HIGH (ref 2–14)
NEUTROPHILS # BLD AUTO: 5.27 K/UL — SIGNIFICANT CHANGE UP (ref 1.8–7.4)
NEUTROPHILS NFR BLD AUTO: 60.2 % — SIGNIFICANT CHANGE UP (ref 43–77)
NT-PROBNP SERPL-SCNC: 840 PG/ML — HIGH
PHOSPHATE SERPL-MCNC: 2.9 MG/DL — SIGNIFICANT CHANGE UP (ref 2.5–4.5)
PLAT MORPH BLD: NORMAL — SIGNIFICANT CHANGE UP
PLATELET # BLD AUTO: 168 K/UL — SIGNIFICANT CHANGE UP (ref 150–400)
PLATELET COUNT - ESTIMATE: NORMAL — SIGNIFICANT CHANGE UP
POTASSIUM SERPL-MCNC: 4.1 MMOL/L — SIGNIFICANT CHANGE UP (ref 3.5–5.3)
POTASSIUM SERPL-SCNC: 4.1 MMOL/L — SIGNIFICANT CHANGE UP (ref 3.5–5.3)
RAPID RVP RESULT: DETECTED
RBC # BLD: 4.43 M/UL — SIGNIFICANT CHANGE UP (ref 4.2–5.8)
RBC # FLD: 13.5 % — SIGNIFICANT CHANGE UP (ref 10.3–14.5)
RBC BLD AUTO: NORMAL — SIGNIFICANT CHANGE UP
RSV RNA SPEC QL NAA+PROBE: SIGNIFICANT CHANGE UP
RV+EV RNA SPEC QL NAA+PROBE: SIGNIFICANT CHANGE UP
SARS-COV-2 RNA SPEC QL NAA+PROBE: SIGNIFICANT CHANGE UP
SMUDGE CELLS # BLD: PRESENT — SIGNIFICANT CHANGE UP
SODIUM SERPL-SCNC: 138 MMOL/L — SIGNIFICANT CHANGE UP (ref 135–145)
TSH SERPL-MCNC: 1.95 UIU/ML — SIGNIFICANT CHANGE UP (ref 0.27–4.2)
VALPROATE SERPL-MCNC: 44.7 UG/ML — LOW (ref 50–100)
VARIANT LYMPHS # BLD: 5.1 % — SIGNIFICANT CHANGE UP (ref 0–6)
WBC # BLD: 8.75 K/UL — SIGNIFICANT CHANGE UP (ref 3.8–10.5)
WBC # FLD AUTO: 8.75 K/UL — SIGNIFICANT CHANGE UP (ref 3.8–10.5)

## 2024-12-21 PROCEDURE — 99233 SBSQ HOSP IP/OBS HIGH 50: CPT

## 2024-12-21 RX ORDER — ALBUTEROL SULFATE 90 UG/1
2 INHALANT RESPIRATORY (INHALATION) EVERY 6 HOURS
Refills: 0 | Status: DISCONTINUED | OUTPATIENT
Start: 2024-12-21 | End: 2024-12-24

## 2024-12-21 RX ADMIN — QUETIAPINE FUMARATE 100 MILLIGRAM(S): 100 TABLET, FILM COATED ORAL at 22:35

## 2024-12-21 RX ADMIN — POLYSORBATE 80 1 DROP(S): 100 SOLUTION/ DROPS OPHTHALMIC at 05:42

## 2024-12-21 RX ADMIN — POLYSORBATE 80 1 DROP(S): 100 SOLUTION/ DROPS OPHTHALMIC at 17:40

## 2024-12-21 RX ADMIN — POLYSORBATE 80 1 DROP(S): 100 SOLUTION/ DROPS OPHTHALMIC at 12:15

## 2024-12-21 RX ADMIN — SENNOSIDES 2 TABLET(S): 8.6 TABLET, FILM COATED ORAL at 22:35

## 2024-12-21 RX ADMIN — ENOXAPARIN SODIUM 40 MILLIGRAM(S): 60 INJECTION INTRAVENOUS; SUBCUTANEOUS at 12:16

## 2024-12-21 RX ADMIN — POLYSORBATE 80 1 DROP(S): 100 SOLUTION/ DROPS OPHTHALMIC at 23:08

## 2024-12-21 RX ADMIN — POLYSORBATE 80 1 DROP(S): 100 SOLUTION/ DROPS OPHTHALMIC at 00:02

## 2024-12-21 RX ADMIN — Medication 3 MILLIGRAM(S): at 22:35

## 2024-12-21 RX ADMIN — DIVALPROEX SODIUM 250 MILLIGRAM(S): 500 TABLET, DELAYED RELEASE ORAL at 05:39

## 2024-12-21 RX ADMIN — DONEPEZIL HYDROCHLORIDE 10 MILLIGRAM(S): 5 TABLET, FILM COATED ORAL at 22:34

## 2024-12-21 RX ADMIN — DIVALPROEX SODIUM 250 MILLIGRAM(S): 500 TABLET, DELAYED RELEASE ORAL at 22:34

## 2024-12-21 RX ADMIN — Medication 5 MILLIGRAM(S): at 05:39

## 2024-12-21 RX ADMIN — DIVALPROEX SODIUM 250 MILLIGRAM(S): 500 TABLET, DELAYED RELEASE ORAL at 13:14

## 2024-12-21 NOTE — PHYSICAL THERAPY INITIAL EVALUATION ADULT - ADDITIONAL COMMENTS
Patient came from [facility] assisted living [facility], patient has 24/7 home health aides. Ambulatory with a rolling walker. Received home PT.    Patient left in bed, NAD, all lines and tubes intact, bed alarm on, call bell within reach, head of bed elevated >30 degrees, RN Verenice aware of PT

## 2024-12-21 NOTE — PATIENT PROFILE ADULT - FALL HARM RISK - HARM RISK INTERVENTIONS
Assistance with ambulation/Assistance OOB with selected safe patient handling equipment/Communicate Risk of Fall with Harm to all staff/Discuss with provider need for PT consult/Monitor gait and stability/Reinforce activity limits and safety measures with patient and family/Tailored Fall Risk Interventions/Visual Cue: Yellow wristband and red socks/Bed in lowest position, wheels locked, appropriate side rails in place/Call bell, personal items and telephone in reach/Instruct patient to call for assistance before getting out of bed or chair/Non-slip footwear when patient is out of bed/[place] to call system/Physically safe environment - no spills, clutter or unnecessary equipment/Purposeful Proactive Rounding/Room/bathroom lighting operational, light cord in reach

## 2024-12-21 NOTE — PROGRESS NOTE ADULT - SUBJECTIVE AND OBJECTIVE BOX
[facility] Division of Hospital Medicine  Rory June MD  Pager (M-F, 8S-6A): [phone]  Other Times:  [phone]    Patient is a 84y old  Male who presents with a chief complaint of Gen weakness (20 Dec 2024 22:41)      SUBJECTIVE / OVERNIGHT EVENTS:  ADDITIONAL REVIEW OF SYSTEMS:    MEDICATIONS  (STANDING):  amLODIPine   Tablet 5 milliGRAM(s) Oral daily  artificial  tears Solution 1 Drop(s) Both EYES four times a day  divalproex  milliGRAM(s) Oral three times a day  donepezil 10 milliGRAM(s) Oral at bedtime  enoxaparin Injectable 40 milliGRAM(s) SubCutaneous every 24 hours  QUEtiapine 100 milliGRAM(s) Oral at bedtime  senna 2 Tablet(s) Oral at bedtime    MEDICATIONS  (PRN):  acetaminophen     Tablet .. 650 milliGRAM(s) Oral every 6 hours PRN Temp greater or equal to 38C (100.4F), Mild Pain (1 - 3)  aluminum hydroxide/magnesium hydroxide/simethicone Suspension 30 milliLiter(s) Oral every 4 hours PRN Dyspepsia  haloperidol    Injectable 2 milliGRAM(s) IntraMuscular every 8 hours PRN Agitation  melatonin 3 milliGRAM(s) Oral at bedtime PRN Insomnia  ondansetron Injectable 4 milliGRAM(s) IV Push every 8 hours PRN Nausea and/or Vomiting  petrolatum white Ointment 1 Application(s) Topical four times a day PRN chapped lips  polyethylene glycol 3350 17 Gram(s) Oral daily PRN for constipation      CAPILLARY BLOOD GLUCOSE        I&O's Summary      PHYSICAL EXAM:  Vital Signs Last 24 Hrs  T(C): 37.3 (21 Dec 2024 11:34), Max: 38.7 (20 Dec 2024 18:45)  T(F): 99.1 (21 Dec 2024 11:34), Max: 101.6 (20 Dec 2024 18:45)  HR: 70 (21 Dec 2024 11:34) (67 - 80)  BP: 138/77 (21 Dec 2024 11:34) (138/72 - 169/82)  BP(mean): --  RR: 17 (21 Dec 2024 11:34) (16 - 19)  SpO2: 100% (21 Dec 2024 11:34) (96% - 100%)    Parameters below as of 21 Dec 2024 11:34  Patient On (Oxygen Delivery Method): room air      CONSTITUTIONAL: NAD  EYES: PERRLA; conjunctiva and sclera clear  ENMT: Moist oral mucosa, no pharyngeal injection or exudates; normal dentition  NECK: Supple, no palpable masses; no thyromegaly  RESPIRATORY: Normal respiratory effort; lungs are clear to auscultation bilaterally  CARDIOVASCULAR: Regular rate and rhythm, normal S1 and S2, no murmur/rub/gallop; No lower extremity edema; Peripheral pulses are 2+ bilaterally  ABDOMEN: Nontender to palpation, normoactive bowel sounds, no rebound/guarding; No hepatosplenomegaly  MUSCULOSKELETAL:  Normal gait; no clubbing or cyanosis of digits; no joint swelling or tenderness to palpation  PSYCH: A+O to person, place, and time; affect appropriate  NEUROLOGY: CN 2-12 are intact and symmetric; no gross sensory deficits   SKIN: No rashes; no palpable lesions    LABS:                        13.3   8.75  )-----------( 168      ( 21 Dec 2024 06:30 )             39.6     12-21    138  |  106  |  11  ----------------------------<  82  4.1   |  21[L]  |  1.02    Ca    8.4      21 Dec 2024 06:30  Phos  2.9     12-21  Mg     2.10     12-21    TPro  6.3  /  Alb  3.5  /  TBili  0.3  /  DBili  x   /  AST  21  /  ALT  18  /  AlkPhos  73  12-20    PT/INR - ( 20 Dec 2024 19:45 )   PT: 13.0 sec;   INR: 1.09 ratio         PTT - ( 20 Dec 2024 19:45 )  PTT:32.8 sec      Urinalysis Basic - ( 21 Dec 2024 06:30 )    Color: x / Appearance: x / SG: x / pH: x  Gluc: 82 mg/dL / Ketone: x  / Bili: x / Urobili: x   Blood: x / Protein: x / Nitrite: x   Leuk Esterase: x / RBC: x / WBC x   Sq Epi: x / Non Sq Epi: x / Bacteria: x        Urinalysis with Rflx Culture (collected 20 Dec 2024 23:15)        RADIOLOGY & ADDITIONAL TESTS:  Results Reviewed:   Imaging Personally Reviewed:  Electrocardiogram Personally Reviewed:    COORDINATION OF CARE:  Care Discussed with Consultants/Other Providers [Y/N]:  Prior or Outpatient Records Reviewed [Y/N]:   [facility] Division of Hospital Medicine  Rory June MD  Pager (M-F, 7J-6G): [phone]  Other Times:  [phone]    Patient is a 84y old  Male who presents with a chief complaint of Gen weakness (20 Dec 2024 22:41)      SUBJECTIVE / OVERNIGHT EVENTS: eating lunch states he feels OK aid at bedside;       MEDICATIONS  (STANDING):  amLODIPine   Tablet 5 milliGRAM(s) Oral daily  artificial  tears Solution 1 Drop(s) Both EYES four times a day  divalproex  milliGRAM(s) Oral three times a day  donepezil 10 milliGRAM(s) Oral at bedtime  enoxaparin Injectable 40 milliGRAM(s) SubCutaneous every 24 hours  QUEtiapine 100 milliGRAM(s) Oral at bedtime  senna 2 Tablet(s) Oral at bedtime    MEDICATIONS  (PRN):  acetaminophen     Tablet .. 650 milliGRAM(s) Oral every 6 hours PRN Temp greater or equal to 38C (100.4F), Mild Pain (1 - 3)  aluminum hydroxide/magnesium hydroxide/simethicone Suspension 30 milliLiter(s) Oral every 4 hours PRN Dyspepsia  haloperidol    Injectable 2 milliGRAM(s) IntraMuscular every 8 hours PRN Agitation  melatonin 3 milliGRAM(s) Oral at bedtime PRN Insomnia  ondansetron Injectable 4 milliGRAM(s) IV Push every 8 hours PRN Nausea and/or Vomiting  petrolatum white Ointment 1 Application(s) Topical four times a day PRN chapped lips  polyethylene glycol 3350 17 Gram(s) Oral daily PRN for constipation      CAPILLARY BLOOD GLUCOSE        I&O's Summary      PHYSICAL EXAM:  Vital Signs Last 24 Hrs  T(C): 37.3 (21 Dec 2024 11:34), Max: 38.7 (20 Dec 2024 18:45)  T(F): 99.1 (21 Dec 2024 11:34), Max: 101.6 (20 Dec 2024 18:45)  HR: 70 (21 Dec 2024 11:34) (67 - 80)  BP: 138/77 (21 Dec 2024 11:34) (138/72 - 169/82)  BP(mean): --  RR: 17 (21 Dec 2024 11:34) (16 - 19)  SpO2: 100% (21 Dec 2024 11:34) (96% - 100%)    Parameters below as of 21 Dec 2024 11:34  Patient On (Oxygen Delivery Method): room air      CONSTITUTIONAL: NAD  EYES: conjunctiva and sclera clear  RESPIRATORY: anterior lung fields CTA; no overt wheezing   CARDIOVASCULAR: Regular rate and rhythm, normal S1 and S2, no murmur/rub/gallop;   ABDOMEN: Nontender to palpation, normoactive bowel sounds, no rebound/guarding;   MUSCULOSKELETAL:  Normal gait; no clubbing or cyanosis of digits; no joint swelling or tenderness to palpation  PSYCH: A+O to person, place, and time; affect appropriate  NEUROLOGY: CN 2-12 are intact and symmetric;       LABS:                        13.3   8.75  )-----------( 168      ( 21 Dec 2024 06:30 )             39.6     12-21    138  |  106  |  11  ----------------------------<  82  4.1   |  21[L]  |  1.02    Ca    8.4      21 Dec 2024 06:30  Phos  2.9     12-21  Mg     2.10     12-21    TPro  6.3  /  Alb  3.5  /  TBili  0.3  /  DBili  x   /  AST  21  /  ALT  18  /  AlkPhos  73  12-20    PT/INR - ( 20 Dec 2024 19:45 )   PT: 13.0 sec;   INR: 1.09 ratio         PTT - ( 20 Dec 2024 19:45 )  PTT:32.8 sec      Urinalysis Basic - ( 21 Dec 2024 06:30 )    Color: x / Appearance: x / SG: x / pH: x  Gluc: 82 mg/dL / Ketone: x  / Bili: x / Urobili: x   Blood: x / Protein: x / Nitrite: x   Leuk Esterase: x / RBC: x / WBC x   Sq Epi: x / Non Sq Epi: x / Bacteria: x        Urinalysis with Rflx Culture (collected 20 Dec 2024 23:15)        RADIOLOGY & ADDITIONAL TESTS:  Results Reviewed:   Imaging Personally Reviewed:  Electrocardiogram Personally Reviewed:    COORDINATION OF CARE:  Care Discussed with Consultants/Other Providers [Y/N]:  Prior or Outpatient Records Reviewed [Y/N]:

## 2024-12-21 NOTE — PHYSICAL THERAPY INITIAL EVALUATION ADULT - PERTINENT HX OF CURRENT PROBLEM, REHAB EVAL
Patient is a 84 year old male ambulatory with a walker at baseline with past medical history of dementia presenting with generalized weakness, fever, dysuria and episodes of paranoia due to dementia.  Presenting with generalized weakness and fever, RVP + coronarvirus. Chest x-ray with possible

## 2024-12-21 NOTE — PROGRESS NOTE ADULT - PROBLEM SELECTOR PLAN 2
Calm but occasionally paranoid as per ED documentation;  EKG: QTc wnl    - Fall precautions, HOB elev, conservative consistency diet   - c/w Depakote, Seroquel   - VA level in AM  - Started Haldol IM 2mg PRN agitation while inpt  - Consider Geriatric Psychiatry c/s if paranoia symptoms worsen Calm but occasionally paranoid as per ED documentation;  EKG: QTc wnl    - Fall precautions, HOB elev, conservative consistency diet   - c/w Depakote, Seroquel   - VA 44.7   - Started Haldol IM 2mg PRN agitation while inpt  - currently calm

## 2024-12-21 NOTE — PATIENT PROFILE ADULT - FUNCTIONAL ASSESSMENT - DAILY ACTIVITY 2.
2 = A lot of assistance Pneumonia Chronic kidney disease, unspecified stage Chronic obstructive pulmonary disease, unspecified COPD type Essential hypertension Rebound headache Vitamin B12 deficiency

## 2024-12-21 NOTE — PHYSICAL THERAPY INITIAL EVALUATION ADULT - GENERAL OBSERVATIONS, REHAB EVAL
Patient found semi-reclined NAD, A&Ox3, +cunningham, +aide at bedside, spO2 98% on room air, patient OK for PT per RN Verenice, patient agreeable to PT evaluation

## 2024-12-21 NOTE — PHYSICAL THERAPY INITIAL EVALUATION ADULT - MANUAL MUSCLE TESTING RESULTS, REHAB EVAL
Patients bilateral upper and lower extremity strength grossly 3+/5 upon MMT and functional assessment

## 2024-12-22 LAB
ANION GAP SERPL CALC-SCNC: 10 MMOL/L — SIGNIFICANT CHANGE UP (ref 7–14)
BASOPHILS # BLD AUTO: 0.04 K/UL — SIGNIFICANT CHANGE UP (ref 0–0.2)
BASOPHILS NFR BLD AUTO: 0.5 % — SIGNIFICANT CHANGE UP (ref 0–2)
BUN SERPL-MCNC: 13 MG/DL — SIGNIFICANT CHANGE UP (ref 7–23)
CALCIUM SERPL-MCNC: 8.6 MG/DL — SIGNIFICANT CHANGE UP (ref 8.4–10.5)
CHLORIDE SERPL-SCNC: 106 MMOL/L — SIGNIFICANT CHANGE UP (ref 98–107)
CO2 SERPL-SCNC: 23 MMOL/L — SIGNIFICANT CHANGE UP (ref 22–31)
CREAT SERPL-MCNC: 1.09 MG/DL — SIGNIFICANT CHANGE UP (ref 0.5–1.3)
EGFR: 68 ML/MIN/1.73M2 — SIGNIFICANT CHANGE UP
EOSINOPHIL # BLD AUTO: 0.17 K/UL — SIGNIFICANT CHANGE UP (ref 0–0.5)
EOSINOPHIL NFR BLD AUTO: 2.2 % — SIGNIFICANT CHANGE UP (ref 0–6)
GLUCOSE BLDC GLUCOMTR-MCNC: 92 MG/DL — SIGNIFICANT CHANGE UP (ref 70–99)
GLUCOSE SERPL-MCNC: 80 MG/DL — SIGNIFICANT CHANGE UP (ref 70–99)
HCT VFR BLD CALC: 41.5 % — SIGNIFICANT CHANGE UP (ref 39–50)
HGB BLD-MCNC: 13.6 G/DL — SIGNIFICANT CHANGE UP (ref 13–17)
IANC: 4.54 K/UL — SIGNIFICANT CHANGE UP (ref 1.8–7.4)
IMM GRANULOCYTES NFR BLD AUTO: 0.1 % — SIGNIFICANT CHANGE UP (ref 0–0.9)
LYMPHOCYTES # BLD AUTO: 1.71 K/UL — SIGNIFICANT CHANGE UP (ref 1–3.3)
LYMPHOCYTES # BLD AUTO: 22 % — SIGNIFICANT CHANGE UP (ref 13–44)
MAGNESIUM SERPL-MCNC: 2.1 MG/DL — SIGNIFICANT CHANGE UP (ref 1.6–2.6)
MCHC RBC-ENTMCNC: 29.5 PG — SIGNIFICANT CHANGE UP (ref 27–34)
MCHC RBC-ENTMCNC: 32.8 G/DL — SIGNIFICANT CHANGE UP (ref 32–36)
MCV RBC AUTO: 90 FL — SIGNIFICANT CHANGE UP (ref 80–100)
MONOCYTES # BLD AUTO: 1.29 K/UL — HIGH (ref 0–0.9)
MONOCYTES NFR BLD AUTO: 16.6 % — HIGH (ref 2–14)
NEUTROPHILS # BLD AUTO: 4.54 K/UL — SIGNIFICANT CHANGE UP (ref 1.8–7.4)
NEUTROPHILS NFR BLD AUTO: 58.6 % — SIGNIFICANT CHANGE UP (ref 43–77)
NRBC # BLD: 0 /100 WBCS — SIGNIFICANT CHANGE UP (ref 0–0)
NRBC # FLD: 0 K/UL — SIGNIFICANT CHANGE UP (ref 0–0)
PHOSPHATE SERPL-MCNC: 3.7 MG/DL — SIGNIFICANT CHANGE UP (ref 2.5–4.5)
PLATELET # BLD AUTO: 165 K/UL — SIGNIFICANT CHANGE UP (ref 150–400)
POTASSIUM SERPL-MCNC: 3.9 MMOL/L — SIGNIFICANT CHANGE UP (ref 3.5–5.3)
POTASSIUM SERPL-SCNC: 3.9 MMOL/L — SIGNIFICANT CHANGE UP (ref 3.5–5.3)
RBC # BLD: 4.61 M/UL — SIGNIFICANT CHANGE UP (ref 4.2–5.8)
RBC # FLD: 13.6 % — SIGNIFICANT CHANGE UP (ref 10.3–14.5)
SODIUM SERPL-SCNC: 139 MMOL/L — SIGNIFICANT CHANGE UP (ref 135–145)
WBC # BLD: 7.76 K/UL — SIGNIFICANT CHANGE UP (ref 3.8–10.5)
WBC # FLD AUTO: 7.76 K/UL — SIGNIFICANT CHANGE UP (ref 3.8–10.5)

## 2024-12-22 PROCEDURE — 99232 SBSQ HOSP IP/OBS MODERATE 35: CPT

## 2024-12-22 PROCEDURE — 71250 CT THORAX DX C-: CPT | Mod: 26

## 2024-12-22 RX ADMIN — Medication 5 MILLIGRAM(S): at 05:50

## 2024-12-22 RX ADMIN — DIVALPROEX SODIUM 250 MILLIGRAM(S): 500 TABLET, DELAYED RELEASE ORAL at 21:31

## 2024-12-22 RX ADMIN — POLYSORBATE 80 1 DROP(S): 100 SOLUTION/ DROPS OPHTHALMIC at 13:14

## 2024-12-22 RX ADMIN — Medication 1 APPLICATION(S): at 13:14

## 2024-12-22 RX ADMIN — POLYSORBATE 80 1 DROP(S): 100 SOLUTION/ DROPS OPHTHALMIC at 05:50

## 2024-12-22 RX ADMIN — ENOXAPARIN SODIUM 40 MILLIGRAM(S): 60 INJECTION INTRAVENOUS; SUBCUTANEOUS at 13:16

## 2024-12-22 RX ADMIN — DIVALPROEX SODIUM 250 MILLIGRAM(S): 500 TABLET, DELAYED RELEASE ORAL at 05:50

## 2024-12-22 RX ADMIN — DIVALPROEX SODIUM 250 MILLIGRAM(S): 500 TABLET, DELAYED RELEASE ORAL at 13:15

## 2024-12-22 RX ADMIN — POLYSORBATE 80 1 DROP(S): 100 SOLUTION/ DROPS OPHTHALMIC at 21:33

## 2024-12-22 RX ADMIN — SENNOSIDES 2 TABLET(S): 8.6 TABLET, FILM COATED ORAL at 21:32

## 2024-12-22 RX ADMIN — QUETIAPINE FUMARATE 100 MILLIGRAM(S): 100 TABLET, FILM COATED ORAL at 21:31

## 2024-12-22 RX ADMIN — DONEPEZIL HYDROCHLORIDE 10 MILLIGRAM(S): 5 TABLET, FILM COATED ORAL at 21:31

## 2024-12-22 NOTE — PROGRESS NOTE ADULT - SUBJECTIVE AND OBJECTIVE BOX
[facility] Division of Hospital Medicine  Rory June MD  Pager (M-F, 4Y-[id number]): [phone]  Other Times:  [phone]    Patient is a 84y old  Male who presents with a chief complaint of Gen weakness (21 Dec 2024 12:07)      SUBJECTIVE / OVERNIGHT EVENTS: Afebrile no acute distress off oxygen     MEDICATIONS  (STANDING):  amLODIPine   Tablet 5 milliGRAM(s) Oral daily  artificial  tears Solution 1 Drop(s) Both EYES four times a day  divalproex  milliGRAM(s) Oral three times a day  donepezil 10 milliGRAM(s) Oral at bedtime  enoxaparin Injectable 40 milliGRAM(s) SubCutaneous every 24 hours  QUEtiapine 100 milliGRAM(s) Oral at bedtime  senna 2 Tablet(s) Oral at bedtime    MEDICATIONS  (PRN):  acetaminophen     Tablet .. 650 milliGRAM(s) Oral every 6 hours PRN Temp greater or equal to 38C (100.4F), Mild Pain (1 - 3)  albuterol    90 MICROgram(s) HFA Inhaler 2 Puff(s) Inhalation every 6 hours PRN Shortness of Breath and/or Wheezing  aluminum hydroxide/magnesium hydroxide/simethicone Suspension 30 milliLiter(s) Oral every 4 hours PRN Dyspepsia  melatonin 3 milliGRAM(s) Oral at bedtime PRN Insomnia  ondansetron Injectable 4 milliGRAM(s) IV Push every 8 hours PRN Nausea and/or Vomiting  petrolatum white Ointment 1 Application(s) Topical four times a day PRN chapped lips  polyethylene glycol 3350 17 Gram(s) Oral daily PRN for constipation      CAPILLARY BLOOD GLUCOSE        I&O's Summary      PHYSICAL EXAM:  Vital Signs Last 24 Hrs  T(C): 36.7 (22 Dec 2024 05:55), Max: 37.3 (21 Dec 2024 11:34)  T(F): 98.1 (22 Dec 2024 05:55), Max: 99.1 (21 Dec 2024 11:34)  HR: 72 (22 Dec 2024 05:55) (67 - 72)  BP: 142/86 (22 Dec 2024 05:55) (128/69 - 155/75)  BP(mean): --  RR: 17 (22 Dec 2024 05:55) (16 - 17)  SpO2: 98% (22 Dec 2024 05:55) (98% - 100%)    Parameters below as of 22 Dec 2024 05:55  Patient On (Oxygen Delivery Method): room air    CONSTITUTIONAL: NAD  EYES: conjunctiva and sclera clear  RESPIRATORY: anterior lung fields CTA; no overt wheezing   CARDIOVASCULAR: Regular rate and rhythm, normal S1 and S2, no murmur/rub/gallop;   ABDOMEN: Nontender to palpation, normoactive bowel sounds, no rebound/guarding;   MUSCULOSKELETAL:  Normal gait; no clubbing or cyanosis of digits; no joint swelling or tenderness to palpation  PSYCH: A+O to person, place, and time; affect appropriate  NEUROLOGY: CN 2-12 are intact and symmetric;       LABS:                        13.6   7.76  )-----------( 165      ( 22 Dec 2024 05:55 )             41.5     12-22    139  |  106  |  13  ----------------------------<  80  3.9   |  23  |  1.09    Ca    8.6      22 Dec 2024 05:55  Phos  3.7     12-22  Mg     2.10     12-22    TPro  6.3  /  Alb  3.5  /  TBili  0.3  /  DBili  x   /  AST  21  /  ALT  18  /  AlkPhos  73  12-20    PT/INR - ( 20 Dec 2024 19:45 )   PT: 13.0 sec;   INR: 1.09 ratio         PTT - ( 20 Dec 2024 19:45 )  PTT:32.8 sec      Urinalysis Basic - ( 22 Dec 2024 05:55 )    Color: x / Appearance: x / SG: x / pH: x  Gluc: 80 mg/dL / Ketone: x  / Bili: x / Urobili: x   Blood: x / Protein: x / Nitrite: x   Leuk Esterase: x / RBC: x / WBC x   Sq Epi: x / Non Sq Epi: x / Bacteria: x        Urinalysis with Rflx Culture (collected 20 Dec 2024 23:15)    Culture - Blood (collected 20 Dec 2024 19:51)  Source: .Blood BLOOD  Preliminary Report (22 Dec 2024 01:02):    No growth at 24 hours    Culture - Blood (collected 20 Dec 2024 19:45)  Source: .Blood BLOOD  Preliminary Report (22 Dec 2024 01:02):    No growth at 24 hours        RADIOLOGY & ADDITIONAL TESTS:  Results Reviewed:   Imaging Personally Reviewed:  Electrocardiogram Personally Reviewed:    COORDINATION OF CARE:  Care Discussed with Consultants/Other Providers [Y/N]:  Prior or Outpatient Records Reviewed [Y/N]:

## 2024-12-22 NOTE — PROGRESS NOTE ADULT - PROBLEM SELECTOR PLAN 2
- Calm but occasionally paranoid as per ED documentation; EKG: QTc wnl    - Fall precautions, HOB elev, conservative consistency diet   - c/w Depakote, Seroquel   - VA 44.7   - currently calm

## 2024-12-23 PROCEDURE — 99232 SBSQ HOSP IP/OBS MODERATE 35: CPT

## 2024-12-23 RX ADMIN — DONEPEZIL HYDROCHLORIDE 10 MILLIGRAM(S): 5 TABLET, FILM COATED ORAL at 22:39

## 2024-12-23 RX ADMIN — QUETIAPINE FUMARATE 100 MILLIGRAM(S): 100 TABLET, FILM COATED ORAL at 22:40

## 2024-12-23 RX ADMIN — POLYSORBATE 80 1 DROP(S): 100 SOLUTION/ DROPS OPHTHALMIC at 14:05

## 2024-12-23 RX ADMIN — SENNOSIDES 2 TABLET(S): 8.6 TABLET, FILM COATED ORAL at 22:40

## 2024-12-23 RX ADMIN — POLYSORBATE 80 1 DROP(S): 100 SOLUTION/ DROPS OPHTHALMIC at 03:28

## 2024-12-23 RX ADMIN — DIVALPROEX SODIUM 250 MILLIGRAM(S): 500 TABLET, DELAYED RELEASE ORAL at 05:58

## 2024-12-23 RX ADMIN — ENOXAPARIN SODIUM 40 MILLIGRAM(S): 60 INJECTION INTRAVENOUS; SUBCUTANEOUS at 14:06

## 2024-12-23 RX ADMIN — POLYSORBATE 80 1 DROP(S): 100 SOLUTION/ DROPS OPHTHALMIC at 22:40

## 2024-12-23 RX ADMIN — DIVALPROEX SODIUM 250 MILLIGRAM(S): 500 TABLET, DELAYED RELEASE ORAL at 22:40

## 2024-12-23 RX ADMIN — Medication 5 MILLIGRAM(S): at 05:58

## 2024-12-23 RX ADMIN — DIVALPROEX SODIUM 250 MILLIGRAM(S): 500 TABLET, DELAYED RELEASE ORAL at 14:06

## 2024-12-23 NOTE — PROGRESS NOTE ADULT - SUBJECTIVE AND OBJECTIVE BOX
[facility] Division of Hospital Medicine  Vadim Torres DO  Available via MS Teams    SUBJECTIVE / OVERNIGHT EVENTS:  Resting in bed  Nessa YBARRA, at bedside; son, Regino, on the phone ([phone])  Patient comfortably, no acute complaints     ADDITIONAL REVIEW OF SYSTEMS:  Reviewed and negative     MEDICATIONS  (STANDING):  amLODIPine   Tablet 5 milliGRAM(s) Oral daily  artificial  tears Solution 1 Drop(s) Both EYES four times a day  divalproex  milliGRAM(s) Oral three times a day  donepezil 10 milliGRAM(s) Oral at bedtime  enoxaparin Injectable 40 milliGRAM(s) SubCutaneous every 24 hours  QUEtiapine 100 milliGRAM(s) Oral at bedtime  senna 2 Tablet(s) Oral at bedtime    MEDICATIONS  (PRN):  acetaminophen     Tablet .. 650 milliGRAM(s) Oral every 6 hours PRN Temp greater or equal to 38C (100.4F), Mild Pain (1 - 3)  albuterol    90 MICROgram(s) HFA Inhaler 2 Puff(s) Inhalation every 6 hours PRN Shortness of Breath and/or Wheezing  aluminum hydroxide/magnesium hydroxide/simethicone Suspension 30 milliLiter(s) Oral every 4 hours PRN Dyspepsia  melatonin 3 milliGRAM(s) Oral at bedtime PRN Insomnia  ondansetron Injectable 4 milliGRAM(s) IV Push every 8 hours PRN Nausea and/or Vomiting  petrolatum white Ointment 1 Application(s) Topical four times a day PRN chapped lips  polyethylene glycol 3350 17 Gram(s) Oral daily PRN for constipation      I&O's Summary      PHYSICAL EXAM:  Vital Signs Last 24 Hrs  T(C): 37 (23 Dec 2024 10:05), Max: 37.3 (22 Dec 2024 18:05)  T(F): 98.6 (23 Dec 2024 10:05), Max: 99.1 (22 Dec 2024 18:05)  HR: 72 (23 Dec 2024 10:05) (63 - 75)  BP: 135/65 (23 Dec 2024 10:05) (118/69 - 154/77)  BP(mean): --  RR: 17 (23 Dec 2024 10:05) (16 - 17)  SpO2: 95% (23 Dec 2024 10:05) (95% - 98%)    Parameters below as of 23 Dec 2024 05:38  Patient On (Oxygen Delivery Method): room air      CONSTITUTIONAL: NAD  NECK: Supple  RESP: No respiratory distress, no use of accessory muscles  CV: RRR  GI: Soft, NT, ND  MSK: No gross deformity   PSYCH: Comfortable and calm     LABS:                        13.6   7.76  )-----------( 165      ( 22 Dec 2024 05:55 )             41.5     12-22    139  |  106  |  13  ----------------------------<  80  3.9   |  23  |  1.09    Ca    8.6      22 Dec 2024 05:55  Phos  3.7     12-22  Mg     2.10     12-22            Urinalysis Basic - ( 22 Dec 2024 05:55 )    Color: x / Appearance: x / SG: x / pH: x  Gluc: 80 mg/dL / Ketone: x  / Bili: x / Urobili: x   Blood: x / Protein: x / Nitrite: x   Leuk Esterase: x / RBC: x / WBC x   Sq Epi: x / Non Sq Epi: x / Bacteria: x        Urinalysis with Rflx Culture (collected 20 Dec 2024 23:15)    Culture - Blood (collected 20 Dec 2024 19:51)  Source: .Blood BLOOD  Preliminary Report (23 Dec 2024 01:02):    No growth at 48 Hours    Culture - Blood (collected 20 Dec 2024 19:45)  Source: .Blood BLOOD  Preliminary Report (23 Dec 2024 01:02):    No growth at 48 Hours      SARS-CoV-2: NotDetec (20 Dec 2024 20:07)      RADIOLOGY & ADDITIONAL TESTS:  New Imaging Personally Reviewed Today: yes  New Electrocardiogram Personally Reviewed Today: yes  Other Results Reviewed Today: yes  Prior or Outpatient Records Reviewed Today with Summary: yes    COORDINATION OF CARE:  Consultant Communication and Details of Discussion (where applicable):

## 2024-12-23 NOTE — PROGRESS NOTE ADULT - PROBLEM SELECTOR PLAN 2
- Calm but occasionally paranoid as per ED documentation; EKG: QTc wnl    - Fall precautions, HOB elev, conservative consistency diet   - c/w Depakote, Seroquel   - VA 44.7   - remains calm

## 2024-12-24 VITALS
TEMPERATURE: 98 F | OXYGEN SATURATION: 99 % | DIASTOLIC BLOOD PRESSURE: 84 MMHG | HEART RATE: 70 BPM | SYSTOLIC BLOOD PRESSURE: 146 MMHG | RESPIRATION RATE: 18 BRPM

## 2024-12-24 DIAGNOSIS — R53.1 WEAKNESS: ICD-10-CM

## 2024-12-24 PROCEDURE — 99239 HOSP IP/OBS DSCHRG MGMT >30: CPT

## 2024-12-24 RX ORDER — ACETAMINOPHEN 80 MG/.8ML
2 SOLUTION/ DROPS ORAL
Qty: 120 | Refills: 0
Start: 2024-12-24 | End: 2025-01-07

## 2024-12-24 RX ORDER — ALBUTEROL SULFATE 90 UG/1
2 INHALANT RESPIRATORY (INHALATION)
Qty: 1 | Refills: 0
Start: 2024-12-24 | End: 2025-01-22

## 2024-12-24 RX ORDER — CHOLECALCIFEROL (VITAMIN D3) 10 MCG
2 TABLET ORAL
Qty: 60 | Refills: 0
Start: 2024-12-24 | End: 2025-01-22

## 2024-12-24 RX ORDER — CHOLECALCIFEROL (VITAMIN D3) 10 MCG
2000 TABLET ORAL DAILY
Refills: 0 | Status: DISCONTINUED | OUTPATIENT
Start: 2024-12-24 | End: 2024-12-24

## 2024-12-24 RX ADMIN — POLYSORBATE 80 1 DROP(S): 100 SOLUTION/ DROPS OPHTHALMIC at 08:59

## 2024-12-24 RX ADMIN — DIVALPROEX SODIUM 250 MILLIGRAM(S): 500 TABLET, DELAYED RELEASE ORAL at 13:01

## 2024-12-24 RX ADMIN — POLYSORBATE 80 1 DROP(S): 100 SOLUTION/ DROPS OPHTHALMIC at 13:03

## 2024-12-24 RX ADMIN — ENOXAPARIN SODIUM 40 MILLIGRAM(S): 60 INJECTION INTRAVENOUS; SUBCUTANEOUS at 13:02

## 2024-12-24 RX ADMIN — POLYSORBATE 80 1 DROP(S): 100 SOLUTION/ DROPS OPHTHALMIC at 02:49

## 2024-12-24 RX ADMIN — Medication 5 MILLIGRAM(S): at 05:01

## 2024-12-24 RX ADMIN — DIVALPROEX SODIUM 250 MILLIGRAM(S): 500 TABLET, DELAYED RELEASE ORAL at 05:00

## 2024-12-24 RX ADMIN — Medication 2000 UNIT(S): at 13:01

## 2024-12-24 NOTE — PROGRESS NOTE ADULT - TIME BILLING
- Ordering, reviewing, and interpreting labs, testing, and imaging.  - Independently obtaining a review of systems and performing a physical exam  - Reviewing consultant documentation/recommendations in addition to discussing plan of care with consultants.  - Counselling and educating patient and family regarding interpretation of aforementioned items and plan of care.
(including, but not limited, to)  - preparing to see the patient (eg, review of tests)   - obtaining and/or reviewing separately obtained history  - performing a medically appropriate examination and/or evaluation   - counseling and educating the patient/family/caregiver    - ordering medications, tests, or procedures   - referring and communicating with other health care professionals (when not separately reported)   - documenting clinical information in the electronic or other health record   - independently interpreting results (not separately reported) and communicating results to the patient/family/caregiver   - care coordination (not separately reported)
(including, but not limited, to)  - preparing to see the patient (eg, review of tests)   - obtaining and/or reviewing separately obtained history  - performing a medically appropriate examination and/or evaluation   - counseling and educating the patient/family/caregiver    - ordering medications, tests, or procedures   - referring and communicating with other health care professionals (when not separately reported)   - documenting clinical information in the electronic or other health record   - independently interpreting results (not separately reported) and communicating results to the patient/family/caregiver   - care coordination (not separately reported)

## 2024-12-24 NOTE — PROGRESS NOTE ADULT - ASSESSMENT
84M ambulatory with a walker at baseline with MHx of dementia (?Alzheimer's), cataracts s/p cataract surgery (on 1/20/24), hypertension, xerotic dermatitis, h/o agitation and psychosis on prior admission a/w generalized weakness, fever, dysuria and episodes of paranoia iso dementia. + RVP - coronavirus 
83 yo male ambulatory with a walker at baseline with MHx of dementia (?Alzheimer's), cataracts s/p cataract surgery (on 1/20/24), hypertension, xerotic dermatitis, h/o agitation and psychosis on prior admission a/w generalized weakness, fever, dysuria and episodes of paranoia iso dementia. + RVP - coronavirus 
85 yo male ambulatory with a walker at baseline with MHx of dementia (?Alzheimer's), cataracts s/p cataract surgery (on 1/20/24), hypertension, xerotic dermatitis, h/o agitation and psychosis on prior admission a/w generalized weakness, fever, dysuria and episodes of paranoia iso dementia. 
84M ambulatory with a walker at baseline with MHx of dementia (?Alzheimer's), cataracts s/p cataract surgery (on 1/20/24), hypertension, xerotic dermatitis, h/o agitation and psychosis on prior admission a/w generalized weakness, fever, dysuria and episodes of paranoia iso dementia. + RVP - coronavirus

## 2024-12-24 NOTE — DISCHARGE NOTE PROVIDER - ATTENDING DISCHARGE PHYSICAL EXAM:
PRINCIPAL DISCHARGE DIAGNOSIS  Diagnosis: Metabolic encephalopathy  Assessment and Plan of Treatment: resolved. You have been dx with Pneumonia, treated with IV antibiotics  CT chest showed: Patchy opacities in the right upper lobe   Covid 19 swab was negative      SECONDARY DISCHARGE DIAGNOSES  Diagnosis: Parkinson disease  Assessment and Plan of Treatment: CT head showed no acute findings, Mild chronic white matter microvascular type changes.  c/w sinemet, fall precautions     Attending Discharge Physical Examination: PRINCIPAL DISCHARGE DIAGNOSIS  Diagnosis: Metabolic encephalopathy  Assessment and Plan of Treatment: You were treated with antibiotics with improvement. Follow up outpatient PCP in 1-2 weeks for further management.         SECONDARY DISCHARGE DIAGNOSES  Diagnosis: Atrial fibrillation  Assessment and Plan of Treatment: Please continue your medications as directed and follow-up with your primary provider/cardiologist to further manage your care. Monitor for signs/symptoms of uncontrolled atrial fibrillation, such as, increased heart rate, palpitations, chest pain, dizziness, or shortness of breath - Return to emergency room if these signs/symptoms are present.      Diagnosis: Congestive heart failure  Assessment and Plan of Treatment: Continue recommended medication regimen. Monitor for signs/symptoms of fluid overload and electrolyte abnormalities, such as, shortness of breath, cough, swelling, chest discomfort, changes in heart rate, dizziness, fainting, or changes in mental status. Follow-up with your PCP/cardiologist outpatient after you've been discharged from the hospital.      Diagnosis: Parkinson disease  Assessment and Plan of Treatment: Continue with Sinemet. Continue with activities as recommended by your therapist to improve in gait and activities of daily living. Follow up outpatient PCP in 1-2 weeks for further management.

## 2024-12-24 NOTE — PROGRESS NOTE ADULT - PROBLEM SELECTOR PLAN 4
Lovenox sq    Dispo: anticipate DC to halfway w PT in next 24 hours, otherwise, will need DALE; SW working to confirm with halfway for possible transfer today

## 2024-12-24 NOTE — DISCHARGE NOTE NURSING/CASE MANAGEMENT/SOCIAL WORK - PATIENT PORTAL LINK FT
You can access the FollowMyHealth Patient Portal offered by [facility] by registering at the following website: [email or web address]. By joining [employer]’s FollowMyHealth portal, you will also be able to view your health information using other applications (apps) compatible with our system.

## 2024-12-24 NOTE — DISCHARGE NOTE PROVIDER - ATTENDING DISCHARGE PHYSICAL EXAMINATION:
CONSTITUTIONAL: NAD  NECK: Supple  RESP: No respiratory distress, no use of accessory muscles  CV: RRR  GI: Soft, NT, ND  MSK: No gross deformity   PSYCH: Comfortable and calm

## 2024-12-24 NOTE — DISCHARGE NOTE PROVIDER - HOSPITAL COURSE
HPI:  85 yo male ambulatory with a walker at baseline with MHx of dementia (?Alzheimer's), cataracts s/p cataract surgery (on 1/20/24), hypertension, xerotic dermatitis, h/o agitation and psychosis on prior admission; Pt arrived  from [facility] for generalized weakness since 1500 on 12/2024. Per aide at the bedside, the patient was complaining of congestion and discomfort on urination. He was also noted to have difficulty walking to the bathroom due to generalized weakness.  The patient states that does not know why is in ER, reported stated that everyone was lying. Reports no CP, cough, n/v/d, Abd pain, HA, leg or arm pain  or eye pain/ ear pain. Per transfer documents, pt on regular diet at the facility.  (20 Dec 2024 22:41)    Hospital Course:  Upper respiratory virus.   ·  Plan: - p/w generalized weakness and fever   - RVP: coronarvirus (+)  - supplemental oxygen as needed, resting comfortably on RA  - CXR with poss retrocardiac opacity   - BCx- NGTD  - albuterol PRN for SOB or wheezing  - Ct Chest: negative for PNA, however did suggest a 7mm NALLELY nodule which should be followed up in 6-12 months, this was discussed with patient, HHA (Nessa) and son (Regino)   - Symptomatically improved.    Dementia with behavioral disturbance.   ·  Plan: - Calm but occasionally paranoid as per ED documentation; EKG: QTc wnl  - Fall precautions, HOB elev, conservative consistency diet   - c/w Depakote, Seroquel   - VA 44.7   - remains calm.    HTN (hypertension).   ·  Plan: - c/w Amlodipine.    Important Medication Changes and Reason:  No changes to medications     Active or Pending Issues Requiring Follow-up:  F/U PCP  F/U Repeat CT chest in 6-12 months for NALLELY 7mm nodule     Advanced Directives:   [x] Full code  [ ] DNR  [ ] Hospice    Discharge Diagnoses:    URI  Viral illness 2/2 Coronavirus   Dementia with behavioral disturbance   HTN   HPI:  85 yo male ambulatory with a walker at baseline with MHx of dementia (?Alzheimer's), cataracts s/p cataract surgery (on 1/20/24), hypertension, xerotic dermatitis, h/o agitation and psychosis on prior admission; Pt arrived  from [facility] for generalized weakness since 1500 on 12/2024. Per aide at the bedside, the patient was complaining of congestion and discomfort on urination. He was also noted to have difficulty walking to the bathroom due to generalized weakness.  The patient states that does not know why is in ER, reported stated that everyone was lying. Reports no CP, cough, n/v/d, Abd pain, HA, leg or arm pain  or eye pain/ ear pain. Per transfer documents, pt on regular diet at the facility.  (20 Dec 2024 22:41)    Hospital Course:  Upper respiratory virus.   ·  Plan: - p/w generalized weakness and fever   - RVP: coronarvirus (+)  - supplemental oxygen as needed, resting comfortably on RA  - CXR with poss retrocardiac opacity   - BCx- NGTD  - albuterol PRN for SOB or wheezing  - Ct Chest: negative for PNA, however did suggest a 7mm NALLELY nodule which should be followed up in 6-12 months, this was discussed with patient, HHA (Nessa) and son (Regino)   - Symptomatically improved.    Dementia with behavioral disturbance.   - Calm but occasionally paranoid as per ED documentation; EKG: QTc wnl  - Fall precautions, HOB elev, conservative consistency diet   - c/w Depakote, Seroquel   - VA 44.7   - remains calm.    HTN (hypertension)- c/w Amlodipine.    Important Medication Changes and Reason:  No changes to medications     Active or Pending Issues Requiring Follow-up:  F/U PCP  F/U Repeat CT chest in 6-12 months for NALLELY 7mm nodule     Advanced Directives:   [x] Full code  [ ] DNR  [ ] Hospice    Discharge Diagnoses:    URI  Viral illness 2/2 Coronavirus   Dementia with behavioral disturbance   HTN

## 2024-12-24 NOTE — PROGRESS NOTE ADULT - PROBLEM SELECTOR PROBLEM 2
Dementia with behavioral disturbance

## 2024-12-24 NOTE — DISCHARGE NOTE PROVIDER - NSDCMRMEDTOKEN_GEN_ALL_CORE_FT
amLODIPine 5 mg oral tablet: 1 tab(s) orally once a day  divalproex sodium 250 mg oral delayed release tablet: 1 tab(s) orally 3 times a day  donepezil 10 mg oral tablet: 1 tab(s) orally once a day  loratadine 10 mg oral tablet: 1 tab(s) orally once a day as needed for  itching  ocular lubricant ophthalmic solution: 2 drop(s) to each affected eye 4 times a day  petrolatum topical ointment: Apply topically to affected area 4 times a day as needed for chapped lips 1 Apply topically to affected area 4 times a day As needed Chapped Lips  polyethylene glycol 3350 oral powder for reconstitution: 17 gram(s) orally once a day as needed for  constipation  QUEtiapine 100 mg oral tablet: 1 tab(s) orally once a day (at bedtime)  senna (sennosides) 8.6 mg oral tablet: 2 tab(s) orally once a day (at bedtime)   acetaminophen 325 mg oral tablet: 2 tab(s) orally every 6 hours as needed for Temp greater or equal to 38C (100.4F), Mild Pain (1 - 3)  albuterol 90 mcg/inh inhalation aerosol: 2 puff(s) inhaled every 6 hours as needed for Shortness of Breath and/or Wheezing  amLODIPine 5 mg oral tablet: 1 tab(s) orally once a day  cholecalciferol 25 mcg (1000 intl units) oral tablet: 2 tab(s) orally once a day  divalproex sodium 250 mg oral delayed release tablet: 1 tab(s) orally 3 times a day  donepezil 10 mg oral tablet: 1 tab(s) orally once a day  loratadine 10 mg oral tablet: 1 tab(s) orally once a day as needed for  itching  ocular lubricant ophthalmic solution: 2 drop(s) to each affected eye 4 times a day  petrolatum topical ointment: Apply topically to affected area 4 times a day as needed for chapped lips 1 Apply topically to affected area 4 times a day As needed Chapped Lips  polyethylene glycol 3350 oral powder for reconstitution: 17 gram(s) orally once a day as needed for  constipation  QUEtiapine 100 mg oral tablet: 1 tab(s) orally once a day (at bedtime)  senna (sennosides) 8.6 mg oral tablet: 2 tab(s) orally once a day (at bedtime)

## 2024-12-24 NOTE — DISCHARGE NOTE NURSING/CASE MANAGEMENT/SOCIAL WORK - FINANCIAL ASSISTANCE
[facility] provides services at a reduced cost to those who are determined to be eligible through [facility]’s financial assistance program. Information regarding [facility]’s financial assistance program can be found by going to [email or web address] or by calling [phone].

## 2024-12-24 NOTE — PROGRESS NOTE ADULT - PROBLEM SELECTOR PLAN 1
- p/w generalized weakness and fever   - RVP + coronarvirus  - droplet precaution  - supplemental oxygen as needed   - CXR with poss retrocardiac opacity   - f/u BCx   - Ct Chest  - albuterol PRN for SOB or wheezing
- p/w generalized weakness and fever   - RVP + coronarvirus  - supplemental oxygen as needed   - CXR with poss retrocardiac opacity   - BCx- NGTD  - Ct Chest  - albuterol PRN for SOB or wheezing
- p/w generalized weakness and fever   - RVP: coronarvirus (+)  - supplemental oxygen as needed   - CXR with poss retrocardiac opacity   - BCx- NGTD  - albuterol PRN for SOB or wheezing  - Ct Chest: negative for PNA, however did suggest a 7mm NALLELY nodule which should be followed up in 6-12 months, this was discussed with patient, HHA (Nessa) and son (Regino)     Symptomatically improved
- p/w generalized weakness and fever   - RVP: coronarvirus (+)  - supplemental oxygen as needed   - CXR with poss retrocardiac opacity   - BCx- NGTD  - albuterol PRN for SOB or wheezing  - Ct Chest: negative for PNA, however did suggest a 7mm NALLELY nodule which should be followed up in 6-12 months, this was discussed with patient, HHA (Nessa) and son (Regino)     Symptomatically improved

## 2024-12-24 NOTE — PROGRESS NOTE ADULT - SUBJECTIVE AND OBJECTIVE BOX
[facility] Division of Hospital Medicine  Vadim Torres DO  Available via MS Teams    SUBJECTIVE / OVERNIGHT EVENTS:  Resting in bed, getting cleaned  Spoke with Nessa YBARRA, at bedside and son, Regino, on the phone ([phone])  No complaints, patient awaiting transfer back to [facility]     ADDITIONAL REVIEW OF SYSTEMS:  Reviewed and negative     MEDICATIONS  (STANDING):  amLODIPine   Tablet 5 milliGRAM(s) Oral daily  artificial  tears Solution 1 Drop(s) Both EYES four times a day  cholecalciferol 2000 Unit(s) Oral daily  divalproex  milliGRAM(s) Oral three times a day  donepezil 10 milliGRAM(s) Oral at bedtime  enoxaparin Injectable 40 milliGRAM(s) SubCutaneous every 24 hours  QUEtiapine 100 milliGRAM(s) Oral at bedtime  senna 2 Tablet(s) Oral at bedtime    MEDICATIONS  (PRN):  acetaminophen     Tablet .. 650 milliGRAM(s) Oral every 6 hours PRN Temp greater or equal to 38C (100.4F), Mild Pain (1 - 3)  albuterol    90 MICROgram(s) HFA Inhaler 2 Puff(s) Inhalation every 6 hours PRN Shortness of Breath and/or Wheezing  aluminum hydroxide/magnesium hydroxide/simethicone Suspension 30 milliLiter(s) Oral every 4 hours PRN Dyspepsia  melatonin 3 milliGRAM(s) Oral at bedtime PRN Insomnia  ondansetron Injectable 4 milliGRAM(s) IV Push every 8 hours PRN Nausea and/or Vomiting  petrolatum white Ointment 1 Application(s) Topical four times a day PRN chapped lips  polyethylene glycol 3350 17 Gram(s) Oral daily PRN for constipation      I&O's Summary      PHYSICAL EXAM:  Vital Signs Last 24 Hrs  T(C): 36.7 (24 Dec 2024 10:27), Max: 36.9 (24 Dec 2024 02:07)  T(F): 98.1 (24 Dec 2024 10:27), Max: 98.4 (24 Dec 2024 02:07)  HR: 75 (24 Dec 2024 10:27) (62 - 75)  BP: 171/72 (24 Dec 2024 10:27) (129/65 - 171/72)  BP(mean): --  RR: 18 (24 Dec 2024 10:27) (16 - 18)  SpO2: 98% (24 Dec 2024 10:27) (95% - 98%)    Parameters below as of 24 Dec 2024 10:27  Patient On (Oxygen Delivery Method): room air      CONSTITUTIONAL: NAD  NECK: Supple  RESP: No respiratory distress, no use of accessory muscles  CV: RRR  GI: Soft, NT, ND  MSK: No gross deformity   PSYCH: Comfortable and calm     LABS:                    SARS-CoV-2: NotDetec (20 Dec 2024 20:07)      RADIOLOGY & ADDITIONAL TESTS:  New Imaging Personally Reviewed Today: yes  New Electrocardiogram Personally Reviewed Today: yes  Other Results Reviewed Today: yes  Prior or Outpatient Records Reviewed Today with Summary: yes    COORDINATION OF CARE:  Consultant Communication and Details of Discussion (where applicable):

## 2024-12-24 NOTE — DISCHARGE NOTE PROVIDER - NSDCCPCAREPLAN_GEN_ALL_CORE_FT
PRINCIPAL DISCHARGE DIAGNOSIS  Diagnosis: Upper respiratory virus  Assessment and Plan of Treatment: You presented with generalized weakness and fever which was found to be a result of a Coronavirus (not COVID-19). Treatment has been supportive and you have improved. Continue to use breathing treatments as needed.   As part of your evaluation you underwent a CT of the chest which showed a 7mm NALLELY nodule, which will need to be followed with PCP for repeat imaging in 6-12 months.      SECONDARY DISCHARGE DIAGNOSES  Diagnosis: Dementia with behavioral disturbance  Assessment and Plan of Treatment: You remained calm. Continued on home medications of Depakote and Seroquel.    Diagnosis: HTN (hypertension)  Assessment and Plan of Treatment: BP was monitored and remained stable on home medication of amlodipine.     PRINCIPAL DISCHARGE DIAGNOSIS  Diagnosis: Upper respiratory virus  Assessment and Plan of Treatment: You presented with generalized weakness and fever which was found to be a result of a Coronavirus (not COVID-19). Treatment has been supportive and you have improved. Continue to use breathing treatments as needed.   As part of your evaluation you underwent a CT of the chest which showed a 7mm NALLELY nodule, which will need to be followed with PCP for repeat imaging in 6-12 months.      SECONDARY DISCHARGE DIAGNOSES  Diagnosis: Dementia with behavioral disturbance  Assessment and Plan of Treatment: You remained calm. Continued on home medications of Depakote and Seroquel.    Diagnosis: HTN (hypertension)  Assessment and Plan of Treatment: Blood pressure was monitored and remained stable on home medication of amlodipine. Monitor for any visual changes, headaches or dizziness.  Monitor blood pressure regularly.  Follow up with your PCP for further management for high blood pressure, please call to make appointment within 1 week of discharge

## 2024-12-26 LAB
CULTURE RESULTS: SIGNIFICANT CHANGE UP
CULTURE RESULTS: SIGNIFICANT CHANGE UP
SPECIMEN SOURCE: SIGNIFICANT CHANGE UP
SPECIMEN SOURCE: SIGNIFICANT CHANGE UP

## 2025-01-17 PROCEDURE — 99215 OFFICE O/P EST HI 40 MIN: CPT

## 2025-03-03 PROCEDURE — 99214 OFFICE O/P EST MOD 30 MIN: CPT

## 2025-04-11 ENCOUNTER — INPATIENT (INPATIENT)
Facility: HOSPITAL | Age: 85
LOS: 3 days | Discharge: HOME HEALTH | End: 2025-04-15
Attending: INTERNAL MEDICINE | Admitting: INTERNAL MEDICINE
Payer: MEDICARE

## 2025-04-11 VITALS
DIASTOLIC BLOOD PRESSURE: 89 MMHG | SYSTOLIC BLOOD PRESSURE: 152 MMHG | HEART RATE: 67 BPM | TEMPERATURE: 97 F | OXYGEN SATURATION: 98 % | RESPIRATION RATE: 16 BRPM | WEIGHT: 188.94 LBS | HEIGHT: 77 IN

## 2025-04-11 DIAGNOSIS — Z98.49 CATARACT EXTRACTION STATUS, UNSPECIFIED EYE: Chronic | ICD-10-CM

## 2025-04-11 DIAGNOSIS — Z86.59 PERSONAL HISTORY OF OTHER MENTAL AND BEHAVIORAL DISORDERS: ICD-10-CM

## 2025-04-11 DIAGNOSIS — I10 ESSENTIAL (PRIMARY) HYPERTENSION: ICD-10-CM

## 2025-04-11 DIAGNOSIS — R00.1 BRADYCARDIA, UNSPECIFIED: ICD-10-CM

## 2025-04-11 DIAGNOSIS — Z29.9 ENCOUNTER FOR PROPHYLACTIC MEASURES, UNSPECIFIED: ICD-10-CM

## 2025-04-11 DIAGNOSIS — G93.40 ENCEPHALOPATHY, UNSPECIFIED: ICD-10-CM

## 2025-04-11 DIAGNOSIS — R45.1 RESTLESSNESS AND AGITATION: ICD-10-CM

## 2025-04-11 LAB
ADD ON TEST-SPECIMEN IN LAB: SIGNIFICANT CHANGE UP
ALBUMIN SERPL ELPH-MCNC: 3.6 G/DL — SIGNIFICANT CHANGE UP (ref 3.3–5)
ALP SERPL-CCNC: 73 U/L — SIGNIFICANT CHANGE UP (ref 40–120)
ALT FLD-CCNC: 16 U/L — SIGNIFICANT CHANGE UP (ref 4–41)
ANION GAP SERPL CALC-SCNC: 10 MMOL/L — SIGNIFICANT CHANGE UP (ref 7–14)
APPEARANCE UR: CLEAR — SIGNIFICANT CHANGE UP
AST SERPL-CCNC: 26 U/L — SIGNIFICANT CHANGE UP (ref 4–40)
B PERT DNA SPEC QL NAA+PROBE: SIGNIFICANT CHANGE UP
B PERT+PARAPERT DNA PNL SPEC NAA+PROBE: SIGNIFICANT CHANGE UP
BASOPHILS # BLD AUTO: 0.04 K/UL — SIGNIFICANT CHANGE UP (ref 0–0.2)
BASOPHILS NFR BLD AUTO: 0.5 % — SIGNIFICANT CHANGE UP (ref 0–2)
BILIRUB SERPL-MCNC: 0.4 MG/DL — SIGNIFICANT CHANGE UP (ref 0.2–1.2)
BILIRUB UR-MCNC: NEGATIVE — SIGNIFICANT CHANGE UP
BUN SERPL-MCNC: 14 MG/DL — SIGNIFICANT CHANGE UP (ref 7–23)
C PNEUM DNA SPEC QL NAA+PROBE: SIGNIFICANT CHANGE UP
CALCIUM SERPL-MCNC: 8.8 MG/DL — SIGNIFICANT CHANGE UP (ref 8.4–10.5)
CHLORIDE SERPL-SCNC: 106 MMOL/L — SIGNIFICANT CHANGE UP (ref 98–107)
CO2 SERPL-SCNC: 24 MMOL/L — SIGNIFICANT CHANGE UP (ref 22–31)
COLOR SPEC: YELLOW — SIGNIFICANT CHANGE UP
CREAT SERPL-MCNC: 1.04 MG/DL — SIGNIFICANT CHANGE UP (ref 0.5–1.3)
DIFF PNL FLD: NEGATIVE — SIGNIFICANT CHANGE UP
EGFR: 72 ML/MIN/1.73M2 — SIGNIFICANT CHANGE UP
EGFR: 72 ML/MIN/1.73M2 — SIGNIFICANT CHANGE UP
EOSINOPHIL # BLD AUTO: 0.22 K/UL — SIGNIFICANT CHANGE UP (ref 0–0.5)
EOSINOPHIL NFR BLD AUTO: 2.6 % — SIGNIFICANT CHANGE UP (ref 0–6)
FLUAV AG NPH QL: SIGNIFICANT CHANGE UP
FLUAV SUBTYP SPEC NAA+PROBE: SIGNIFICANT CHANGE UP
FLUBV AG NPH QL: SIGNIFICANT CHANGE UP
FLUBV RNA SPEC QL NAA+PROBE: SIGNIFICANT CHANGE UP
GLUCOSE SERPL-MCNC: 82 MG/DL — SIGNIFICANT CHANGE UP (ref 70–99)
GLUCOSE UR QL: NEGATIVE MG/DL — SIGNIFICANT CHANGE UP
HADV DNA SPEC QL NAA+PROBE: SIGNIFICANT CHANGE UP
HCOV 229E RNA SPEC QL NAA+PROBE: SIGNIFICANT CHANGE UP
HCOV HKU1 RNA SPEC QL NAA+PROBE: SIGNIFICANT CHANGE UP
HCOV NL63 RNA SPEC QL NAA+PROBE: SIGNIFICANT CHANGE UP
HCOV OC43 RNA SPEC QL NAA+PROBE: SIGNIFICANT CHANGE UP
HCT VFR BLD CALC: 44.3 % — SIGNIFICANT CHANGE UP (ref 39–50)
HGB BLD-MCNC: 14.3 G/DL — SIGNIFICANT CHANGE UP (ref 13–17)
HIV 1+2 AB+HIV1 P24 AG SERPL QL IA: SIGNIFICANT CHANGE UP
HMPV RNA SPEC QL NAA+PROBE: SIGNIFICANT CHANGE UP
HPIV1 RNA SPEC QL NAA+PROBE: SIGNIFICANT CHANGE UP
HPIV2 RNA SPEC QL NAA+PROBE: SIGNIFICANT CHANGE UP
HPIV3 RNA SPEC QL NAA+PROBE: SIGNIFICANT CHANGE UP
HPIV4 RNA SPEC QL NAA+PROBE: SIGNIFICANT CHANGE UP
IANC: 5.03 K/UL — SIGNIFICANT CHANGE UP (ref 1.8–7.4)
IMM GRANULOCYTES NFR BLD AUTO: 0.2 % — SIGNIFICANT CHANGE UP (ref 0–0.9)
KETONES UR-MCNC: NEGATIVE MG/DL — SIGNIFICANT CHANGE UP
LEUKOCYTE ESTERASE UR-ACNC: NEGATIVE — SIGNIFICANT CHANGE UP
LYMPHOCYTES # BLD AUTO: 2.39 K/UL — SIGNIFICANT CHANGE UP (ref 1–3.3)
LYMPHOCYTES # BLD AUTO: 28 % — SIGNIFICANT CHANGE UP (ref 13–44)
M PNEUMO DNA SPEC QL NAA+PROBE: SIGNIFICANT CHANGE UP
MAGNESIUM SERPL-MCNC: 2.3 MG/DL — SIGNIFICANT CHANGE UP (ref 1.6–2.6)
MCHC RBC-ENTMCNC: 30 PG — SIGNIFICANT CHANGE UP (ref 27–34)
MCHC RBC-ENTMCNC: 32.3 G/DL — SIGNIFICANT CHANGE UP (ref 32–36)
MCV RBC AUTO: 92.9 FL — SIGNIFICANT CHANGE UP (ref 80–100)
MONOCYTES # BLD AUTO: 0.85 K/UL — SIGNIFICANT CHANGE UP (ref 0–0.9)
MONOCYTES NFR BLD AUTO: 9.9 % — SIGNIFICANT CHANGE UP (ref 2–14)
NEUTROPHILS # BLD AUTO: 5.03 K/UL — SIGNIFICANT CHANGE UP (ref 1.8–7.4)
NEUTROPHILS NFR BLD AUTO: 58.8 % — SIGNIFICANT CHANGE UP (ref 43–77)
NITRITE UR-MCNC: NEGATIVE — SIGNIFICANT CHANGE UP
NRBC # BLD AUTO: 0 K/UL — SIGNIFICANT CHANGE UP (ref 0–0)
NRBC # FLD: 0 K/UL — SIGNIFICANT CHANGE UP (ref 0–0)
NRBC BLD AUTO-RTO: 0 /100 WBCS — SIGNIFICANT CHANGE UP (ref 0–0)
PH UR: 7.5 — SIGNIFICANT CHANGE UP (ref 5–8)
PHOSPHATE SERPL-MCNC: 3 MG/DL — SIGNIFICANT CHANGE UP (ref 2.5–4.5)
PLATELET # BLD AUTO: 185 K/UL — SIGNIFICANT CHANGE UP (ref 150–400)
POTASSIUM SERPL-MCNC: 4.9 MMOL/L — SIGNIFICANT CHANGE UP (ref 3.5–5.3)
POTASSIUM SERPL-SCNC: 4.9 MMOL/L — SIGNIFICANT CHANGE UP (ref 3.5–5.3)
PROT SERPL-MCNC: 6.4 G/DL — SIGNIFICANT CHANGE UP (ref 6–8.3)
PROT UR-MCNC: NEGATIVE MG/DL — SIGNIFICANT CHANGE UP
RAPID RVP RESULT: SIGNIFICANT CHANGE UP
RBC # BLD: 4.77 M/UL — SIGNIFICANT CHANGE UP (ref 4.2–5.8)
RBC # FLD: 14.3 % — SIGNIFICANT CHANGE UP (ref 10.3–14.5)
RSV RNA NPH QL NAA+NON-PROBE: SIGNIFICANT CHANGE UP
RSV RNA SPEC QL NAA+PROBE: SIGNIFICANT CHANGE UP
RV+EV RNA SPEC QL NAA+PROBE: SIGNIFICANT CHANGE UP
SARS-COV-2 RNA SPEC QL NAA+PROBE: SIGNIFICANT CHANGE UP
SARS-COV-2 RNA SPEC QL NAA+PROBE: SIGNIFICANT CHANGE UP
SODIUM SERPL-SCNC: 140 MMOL/L — SIGNIFICANT CHANGE UP (ref 135–145)
SOURCE RESPIRATORY: SIGNIFICANT CHANGE UP
SP GR SPEC: 1.01 — SIGNIFICANT CHANGE UP (ref 1–1.03)
TROPONIN T, HIGH SENSITIVITY RESULT: 35 NG/L — SIGNIFICANT CHANGE UP
TSH SERPL-MCNC: 0.9 UIU/ML — SIGNIFICANT CHANGE UP (ref 0.27–4.2)
UROBILINOGEN FLD QL: 0.2 MG/DL — SIGNIFICANT CHANGE UP (ref 0.2–1)
WBC # BLD: 8.55 K/UL — SIGNIFICANT CHANGE UP (ref 3.8–10.5)
WBC # FLD AUTO: 8.55 K/UL — SIGNIFICANT CHANGE UP (ref 3.8–10.5)

## 2025-04-11 PROCEDURE — 99285 EMERGENCY DEPT VISIT HI MDM: CPT

## 2025-04-11 PROCEDURE — 70450 CT HEAD/BRAIN W/O DYE: CPT | Mod: 26

## 2025-04-11 PROCEDURE — 99223 1ST HOSP IP/OBS HIGH 75: CPT

## 2025-04-11 PROCEDURE — 71045 X-RAY EXAM CHEST 1 VIEW: CPT | Mod: 26

## 2025-04-11 PROCEDURE — 99222 1ST HOSP IP/OBS MODERATE 55: CPT | Mod: GC

## 2025-04-11 RX ORDER — ALBUTEROL SULFATE 2.5 MG/3ML
2 VIAL, NEBULIZER (ML) INHALATION EVERY 6 HOURS
Refills: 0 | Status: DISCONTINUED | OUTPATIENT
Start: 2025-04-11 | End: 2025-04-15

## 2025-04-11 RX ORDER — HYPROMELLOSE 0.4 %
1 DROPS OPHTHALMIC (EYE)
Refills: 0 | Status: DISCONTINUED | OUTPATIENT
Start: 2025-04-11 | End: 2025-04-15

## 2025-04-11 RX ORDER — AMLODIPINE BESYLATE 10 MG/1
5 TABLET ORAL DAILY
Refills: 0 | Status: DISCONTINUED | OUTPATIENT
Start: 2025-04-12 | End: 2025-04-15

## 2025-04-11 RX ORDER — QUETIAPINE FUMARATE 25 MG/1
100 TABLET ORAL AT BEDTIME
Refills: 0 | Status: DISCONTINUED | OUTPATIENT
Start: 2025-04-11 | End: 2025-04-15

## 2025-04-11 RX ORDER — MELATONIN 5 MG
6 TABLET ORAL AT BEDTIME
Refills: 0 | Status: DISCONTINUED | OUTPATIENT
Start: 2025-04-11 | End: 2025-04-15

## 2025-04-11 RX ORDER — ENOXAPARIN SODIUM 100 MG/ML
40 INJECTION SUBCUTANEOUS EVERY 24 HOURS
Refills: 0 | Status: DISCONTINUED | OUTPATIENT
Start: 2025-04-11 | End: 2025-04-15

## 2025-04-11 RX ORDER — HYPROMELLOSE 0.4 %
1 DROPS OPHTHALMIC (EYE)
Refills: 0 | DISCHARGE

## 2025-04-11 RX ORDER — ACETAMINOPHEN 500 MG/5ML
650 LIQUID (ML) ORAL EVERY 6 HOURS
Refills: 0 | Status: DISCONTINUED | OUTPATIENT
Start: 2025-04-11 | End: 2025-04-15

## 2025-04-11 RX ORDER — HALOPERIDOL 10 MG/1
2.5 TABLET ORAL ONCE
Refills: 0 | Status: DISCONTINUED | OUTPATIENT
Start: 2025-04-11 | End: 2025-04-11

## 2025-04-11 RX ORDER — DONEPEZIL HYDROCHLORIDE 5 MG/1
10 TABLET ORAL DAILY
Refills: 0 | Status: DISCONTINUED | OUTPATIENT
Start: 2025-04-12 | End: 2025-04-15

## 2025-04-11 RX ORDER — HALOPERIDOL 10 MG/1
2.5 TABLET ORAL ONCE
Refills: 0 | Status: COMPLETED | OUTPATIENT
Start: 2025-04-11 | End: 2025-04-11

## 2025-04-11 RX ADMIN — ENOXAPARIN SODIUM 40 MILLIGRAM(S): 100 INJECTION SUBCUTANEOUS at 23:53

## 2025-04-11 RX ADMIN — HALOPERIDOL 2.5 MILLIGRAM(S): 10 TABLET ORAL at 16:44

## 2025-04-11 RX ADMIN — Medication 1 DROP(S): at 19:24

## 2025-04-11 RX ADMIN — Medication 250 MILLIGRAM(S): at 18:37

## 2025-04-11 NOTE — CONSULT NOTE ADULT - SUBJECTIVE AND OBJECTIVE BOX
Patient seen and evaluated at bedside    HPI:  84M PMHx of dementia (A&Ox2 baseline), cataracts s/p cataract surgery (on 1/20/24), HTN, h/o agitation and psychosis presenting from assisted living with agitation/AMS.    EP consulted for bradycardia.    EKG and tele show second degree type 1 block (Wenckebach), rates mostly 50s-60s, occasionally high 40s. He states he has some chronic dizziness, but that's at a rate of 60 and BP 140s/60s, so less likely rhythm-related. Chronically feels fatigued. No CP/SOB, or other major symptoms. Agitation/AMS seems to have resolved as he's back to documented baseline.      PMHx:   Dementia  Hypertension  Xerotic eczema  Cataract    PSHx:   S/P cataract surgery    Allergies:  No Known Allergies    Current Medications:   acetaminophen     Tablet .. 650 milliGRAM(s) Oral every 6 hours PRN  albuterol    90 MICROgram(s) HFA Inhaler 2 Puff(s) Inhalation every 6 hours PRN  artificial  tears Solution 1 Drop(s) Both EYES two times a day  divalproex  milliGRAM(s) Oral <User Schedule>  enoxaparin Injectable 40 milliGRAM(s) SubCutaneous every 24 hours  melatonin 6 milliGRAM(s) Oral at bedtime  QUEtiapine 100 milliGRAM(s) Oral at bedtime    REVIEW OF SYSTEMS:  All other review of systems is negative unless indicated above.    Physical Exam:  T(F): 97.9 (04-11), Max: 98.4 (04-11)  HR: 58 (04-11) (51 - 67)  BP: 168/95 (04-11) (146/81 - 168/95)  RR: 12 (04-11)  SpO2: 97% (04-11)  GENERAL: No acute distress, well-developed  CHEST/LUNG: Clear to auscultation bilaterally; No wheeze, equal breath sounds bilaterally   HEART: Regular rate and rhythm; No murmurs, rubs, or gallops  ABDOMEN: Soft, Nontender, Nondistended  EXTREMITIES:  No clubbing, cyanosis, or edema  NEUROLOGY: AAOx2    CXR: Personally reviewed    Labs: Personally reviewed                        14.3   8.55  )-----------( 185      ( 11 Apr 2025 14:14 )             44.3     04-11    140  |  106  |  14  ----------------------------<  82  4.9   |  24  |  1.04    Ca    8.8      11 Apr 2025 12:53  Phos  3.0     04-11  Mg     2.30     04-11    TPro  6.4  /  Alb  3.6  /  TBili  0.4  /  DBili  x   /  AST  26  /  ALT  16  /  AlkPhos  73  04-11        CARDIAC MARKERS ( 11 Apr 2025 12:53 )  41 ng/L / x     / x     / x     / x     / x

## 2025-04-11 NOTE — ED ADULT NURSE NOTE - IN ACCORDANCE WITH [place] LAW, WE OFFER EVERY PATIENT A HEPATITIS C TEST. WOULD YOU LIKE TO BE TESTED TODAY?
Are there any outstanding tasks in the patients's chart (ex.labs,MM,etc)?  no  Do we have outstanding/pending referrals?  yes  Has the patient been seen in and ER,UCC, or been admitted since last visit?  no  Has the patient seen any other health care provider(doctors) since last visit?  no  Has the patient had any bloodwork or x-rays done since last visit?  no  
Yes, get tested

## 2025-04-11 NOTE — H&P ADULT - NSHPSOCIALHISTORY_GEN_ALL_CORE
Lives in NH at [facility]   Baseline AAox1-2 (Name//Location at times) per aide  Is on regular diet at the facility  Ambulatory with walker at baseline

## 2025-04-11 NOTE — H&P ADULT - CONVERSATION DETAILS
Called and spoke with son Regino abbasi in regards to pt's current admission, plan of care reviewed in detail with him. Had a voluntary conversation in regards to code status as routine part of admission process. Pt is AAox1 with dementia, not able to understand or verbalize thus asked son for further input. Son states that in the event of cardiopulmonary distress and arrest, he would prefer a trial of resuscitative efforts for the patient with CPR and intubation. Understanding and expressed agreement for full code measures.    - Full code

## 2025-04-11 NOTE — H&P ADULT - PROBLEM SELECTOR PLAN 3
- hx of dementia with behavioral disturbance  - home regimen: depakote 250mg TID, seroquel 100mg qhs, and aricept 10mg daily  - c/w home regimen  - psych recs appreciated given ongoing behavioral issues/agitation   - metabolic w/u as above   - 1:1 / safety watch for now for safety concerns as per aide, tries to fight/get out of bed when agitated.

## 2025-04-11 NOTE — H&P ADULT - ASSESSMENT
Mr. Jalloh is an 83 y/o M with PMH of dementia (AAOx2 at baseline), Hx of HTN, Hx of cataracts s/p cataract surgery on 1/20/24, Xerotic dermatitis, hx of agitation and psychosis on prior admissions who presents via ambulance from [facility] to [facility] for further eval and management for agitation, occurring since 6 PM on 4/10 and with continued episodes of on 4/11 in AM as well. Also noted to be bradycardia to 30s-40s. EP and psych consulted. Admitted for further w/u and evaluation.

## 2025-04-11 NOTE — H&P ADULT - PROBLEM SELECTOR PLAN 2
- Noted with sinus bradycardia to 30s-40s. Tele with ?2nd degree HB type 1 vs type 2. Hstrop wnl. EKG reviewed.  - EP consulted; recs appreciated  - check baseline TTE  - avoid AVNB agents (including BB eye drops)  - tele monitoring  - check TSH
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
## 2025-04-11 NOTE — ED ADULT NURSE NOTE - OBJECTIVE STATEMENT
Patient received to room #10, A&OX2 (at baseline), ambulatory with assist, English speaking, Hx. of HTN, and dementia, accompanied by aid at bedside, coming to the ED by EMS from [facility] for c/o altered mental status, and increased agitation, As per aid the patient became agitated yesterday evening around 6 PM which is an unusual behavior for him. Patient denies fevers, chills, chest pain, sob, headache, dizziness, blurry vision, abdominal pain, n/v/d, and urinary symptoms. Respirations even, unlabored, and clear in all fields, sinus bradycardia on the tele monitor, abdomen soft, nondistended, nontender, skin intact, labs drawn and sent. Care plan continued, comfort measures provided, safety maintained. Patient received to room #10, A&OX2 (at baseline), ambulatory with assist, English speaking, Hx. of HTN, and dementia, accompanied by aid at bedside, coming to the ED by EMS from [facility] for c/o altered mental status, and increased agitation, As per aid the patient became agitated yesterday evening around 6 PM which is an unusual behavior for him. Patient denies fevers, chills, chest pain, sob, headache, dizziness, blurry vision, abdominal pain, n/v/d, and urinary symptoms. Respirations even, unlabored, and clear in all fields, sinus bradycardia on the tele monitor, Patient HR ranges from 39-60 bpm, Patient placed on zoll, abdomen soft, nondistended, nontender, skin intact, labs drawn and sent. Care plan continued, comfort measures provided, safety maintained.

## 2025-04-11 NOTE — ED ADULT NURSE NOTE - CHIEF COMPLAINT QUOTE
Pt presents to ED via EMS from [facility] with c/o altered mental status and increased agitation since 6pm yesterday. Pt has hx of dementia (A&OX2 at baseline) and HTN. Pt denies weakness, numbness, dizziness, or other neuro deficits. Dr. Joe called advised not to activate at this time.

## 2025-04-11 NOTE — ED PROVIDER NOTE - CLINICAL SUMMARY MEDICAL DECISION MAKING FREE TEXT BOX
85 yo male ambulatory with a walker at baseline with MHx of dementia (AOX2 baseline), cataracts s/p cataract surgery (on 1/20/24), HTN, xerotic dermatitis, h/o agitation and psychosis on prior admission; Pt arrived  from [facility] for agitation since 6PM yesterday. 85 yo male ambulatory with a walker at baseline with MHx of dementia (AOX2 baseline), cataracts s/p cataract surgery (on 1/20/24), HTN, xerotic dermatitis, h/o agitation and psychosis on prior admission; Pt arrived  from [facility] for agitation since 6PM yesterday. Aide at bedside states patient started to become verbally and physically aggressive since yesterday, different from his baseline.  Also more confused only AAO x 1 to self.  Patient currently calm, interactive, denies any symptoms including nausea, vomiting, diarrhea, chest pain, shortness of breath,  Cough, fever, chills, abdominal pain, weakness, numbness, tingling, dysuria, urinary frequency/urgency..  Endorses a sore throat.    Vitals on presentation nonactionable, exam as above.  Presentation AMS concerning for metabolic/toxic encephalopathy, nonfocal neurological exam,  Low concern for CVA.  Will check labs, urine, CT head.  Dispo pending workup.

## 2025-04-11 NOTE — H&P ADULT - NSHPLABSRESULTS_GEN_ALL_CORE
LABS:                         14.3   8.55  )-----------( 185      ( 2025 14:14 )             44.3     04-11    140  |  106  |  14  ----------------------------<  82  4.9   |  24  |  1.04    Ca    8.8      2025 12:53  Phos  3.0       Mg     2.30         TPro  6.4  /  Alb  3.6  /  TBili  0.4  /  DBili  x   /  AST  26  /  ALT  16  /  AlkPhos  73        Urinalysis Basic - ( 2025 15:37 )    Color: Yellow / Appearance: Clear / S.012 / pH: x  Gluc: x / Ketone: Negative mg/dL  / Bili: Negative / Urobili: 0.2 mg/dL   Blood: x / Protein: Negative mg/dL / Nitrite: Negative   Leuk Esterase: Negative / RBC: x / WBC x   Sq Epi: x / Non Sq Epi: x / Bacteria: x                  Urinalysis with Rflx Culture (collected 25 @ 15:37)        RADIOLOGY, EKG & ADDITIONAL TESTS: Reviewed.   < from: Xray Chest 1 View- PORTABLE-Urgent (Xray Chest 1 View- PORTABLE-Urgent .) (25 @ 15:48) >    IMPRESSION:  No focal consolidation or pleural effusion.    --- End of Report ---    < end of copied text >

## 2025-04-11 NOTE — H&P ADULT - NSHPPHYSICALEXAM_GEN_ALL_CORE
Vitals:  Vital Signs Last 24 Hrs  T(C): 36.6 (2025 15:38), Max: 36.9 (2025 13:00)  T(F): 97.9 (2025 15:38), Max: 98.4 (2025 13:00)  HR: 58 (2025 15:38) (51 - 67)  BP: 168/95 (2025 15:38) (146/81 - 168/95)  BP(mean): --  RR: 12 (2025 15:38) (12 - 16)  SpO2: 97% (2025 15:38) (97% - 98%)    Parameters below as of 2025 15:38  Patient On (Oxygen Delivery Method): room air      I&O's Summary    CAPILLARY BLOOD GLUCOSE      POCT Blood Glucose.: 82 mg/dL (2025 11:13)      PHYSICAL EXAM:  GENERAL: NAD, lying in bed comfortably, on RA, no tachypnea/accessory mm use   HEENT: NC/AT, EOMI, PERRL, anicteric sclera, MMM   NECK: Supple, No JVD  CHEST/LUNG: CTAB, no increased WOB  HEART: RRR, no m/r/g; no chest wall tenderness  ABDOMEN: soft, NT, ND, BS+; no bladder or CVA tenderness   EXTREMITIES:  2+ peripheral pulses, no clubbing, no edema  NERVOUS SYSTEM:  A&Ox1 (name/), face symmetric, speech fluent   MSK: FROM all 4 extremities, full and equal strength  SKIN: no overt skin rashes/lesions, warm
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
## 2025-04-11 NOTE — CONSULT NOTE ADULT - ATTENDING COMMENTS
84M PMHx of dementia (A&Ox2 baseline), cataracts s/p cataract surgery (on 1/20/24), HTN, h/o agitation and psychosis presenting from assisted living with agitation/AMS. EP consulted for bradycardia. EKG and tele show second degree type 1 block (Wenckebach), rates mostly 50s-60s, occasionally high 40s. He states he has some chronic dizziness, but that's at a rate of 60 and BP 140s/60s, so less likely rhythm-related. Chronically feels fatigued. No CP/SOB, or other major symptoms. Agitation/AMS seems to have resolved as he's back to documented baseline. No apparent indication for PPM as patient has asymptomatic Mobitz I second degree AV block.

## 2025-04-11 NOTE — ED PROVIDER NOTE - ATTENDING CONTRIBUTION TO CARE
Jose Joe, DO:  patient seen and evaluated with the resident.  I was present for key portions of the History & Physical, and I agree with the Impression & Plan. 83 yo m pmh  dementia (AOX2 baseline), cataracts s/p cataract surgery (on 1/20/24), HTN, xerotic dermatitis, pw agitation. PW aide bedside provides collateral.  Reports since approximately 1800 yesterday has been agitated, and combative.  Denies falls, and/weakness/C.  Patient demented baseline and unable to provide significant collateral.  Initially was called for this patient to have a code stroke however patient has no identifiable deficits, is following commands.  Patient denies all acute medical complaints.  Will do AMS workup including infectious, and CT head.  Do not suspect CVA.  Labs, imaging, reassess.  Do not suspect meningitis versus encephalitis, full ROM of neck.

## 2025-04-11 NOTE — PHARMACOTHERAPY INTERVENTION NOTE - COMMENTS
Medication history is complete. Medication list updated in Outpatient Medication Record (OMR) as per medical records/list provided by [facility].     Home Medications:  divalproex sodium 250 mg oral delayed release tablet: 1 tab(s) orally 3 times a day (9am, 1pm & 5pm)   donepezil 10 mg oral tablet: 1 tab(s) orally once a day   Refresh Digital PF ophthalmic solution: 1 drop(s) in each eye 2 times a day  acetaminophen 325 mg oral tablet: 2 tab(s) orally every 6 hours as needed for Temp greater or equal to 38C (100.4F), Mild Pain (1 - 3)   albuterol 90 mcg/inh inhalation aerosol: 2 puff(s) inhaled every 6 hours as needed for Shortness of Breath and/or Wheezing  amLODIPine 5 mg oral tablet: 1 tab(s) orally once a day   cholecalciferol 25 mcg (1000 intl units) oral tablet: 2 tab(s) orally once a day  petrolatum topical ointment: Apply topically to affected area 4 times a day as needed for chapped lips 1 Apply topically to affected area 4 times a day As needed Chapped Lips  QUEtiapine 100 mg oral tablet: 1 tab(s) orally once a day (at bedtime)

## 2025-04-11 NOTE — PATIENT PROFILE ADULT - FALL HARM RISK - HARM RISK INTERVENTIONS
Assistance with ambulation/Assistance OOB with selected safe patient handling equipment/Communicate Risk of Fall with Harm to all staff/Discuss with provider need for PT consult/Monitor gait and stability/Reinforce activity limits and safety measures with patient and family/Tailored Fall Risk Interventions/Visual Cue: Yellow wristband and red socks/Bed in lowest position, wheels locked, appropriate side rails in place/Call bell, personal items and telephone in reach/Instruct patient to call for assistance before getting out of bed or chair/Non-slip footwear when patient is out of bed/[place] to call system/Physically safe environment - no spills, clutter or unnecessary equipment/Purposeful Proactive Rounding/Room/bathroom lighting operational, light cord in reach

## 2025-04-11 NOTE — PATIENT PROFILE ADULT - HOW PATIENT ADDRESSED, PROFILE
Routine referral from Chanda Anguiano NP for Centrilobular Emphysema    2023 Telephone Encounter        OV 2023 with Chanda Anguiano NP  Centrilobular emphysema/On home oxygen therapy:  Consulting Dr. Li, electrophysiologist is on dual chamber pacemaker.  Has history of atrial fibrillation and atrial flutter. Was admitted in hospital on  and discharged on , was discharged in stable condition. Was ambulated in [place] on room air with oxygen saturation down to 85%. Has oxygen at home, not using. Has pulse oximeter at home, average reading is 95% or lower near 90%. Not using oxygen at night either.  Had COPD, was on inhaler in the past was not helpful, stopped using inhalers 5 years back. Mentions he needs to take many breaks while walking due to shortness of breath.   PLAN:  Centrilobular emphysema (CMS/HCC)/ On home oxygen therapy:  Recommended using inhaler.  Advised to use oxygen room concentrator if he has trouble breathing.  Advised to reach out if symptoms worsen.    Patient will need per provider protocol PFT, 6MW & CXR ()    Message to [facility] PSR's:  Type of appointment:  New Patient  Length of appointment:  60 minutes  Provider:  Marci  Location of appointment:  [place]  Date/Time of appointment:  Next Available Opening  Testing needed for appointment:  MANSI Pulm testing: Complete PFT, 6 minute walk and Chest Xray  Time Frame for Testin-3 days prior to appointment    Appt Notes: NP/Centrilobular Emphysema/PFT, 6MW & CXR ()    Orders entered    PSR: Patient may have testing in [place].  First appt with [place] and then any follow up appts with NP.   Mr Jalloh

## 2025-04-11 NOTE — CONSULT NOTE ADULT - ASSESSMENT
84M PMHx of dementia (A&Ox2 baseline), cataracts s/p cataract surgery (on 1/20/24), HTN, h/o agitation and psychosis presenting from assisted living with agitation/AMS.    EP consulted for bradycardia.    EKG and tele show second degree type 1 block (Wenckebach), rates mostly 50s-60s, occasionally high 40s. He states he has some chronic dizziness, but that's at a rate of 60 and BP 140s/60s, so less likely rhythm-related. Chronically feels fatigued. No CP/SOB, or other major symptoms. Agitation/AMS seems to have resolved as he's back to documented baseline.    Recommendations:  - Wenckebach is a benign rhythm and I do not expect it to rapidly progress to high degree block at this time-  - Please get TTE for baseline  - Avoid AV ladan blocking agents (this includes beta-blocker eye drops which can have systemic effects)  - Tele monitoring if admitted  - TSH with next blood draw    Discussed with Dr. Mcallister.    Preet Gilmore, PGY-5  Cardiology Fellow

## 2025-04-11 NOTE — ED ADULT TRIAGE NOTE - CHIEF COMPLAINT QUOTE
Pt presents to ED via EMS from [facility] with c/o altered mental status and increased agitation since 6pm yesterday. Pt has hx of dementia (A&OX2 at baseline) and HTN. Pt denies weakness, numbness, dizziness, or other neuro deficits. Dr. Joe called advised not to activate at this time.

## 2025-04-11 NOTE — ED PROVIDER NOTE - PHYSICAL EXAMINATION
Vital signs reviewed.  CONSTITUTIONAL: NAD   HEAD: Normocephalic; atraumatic  EYES: EOMI, PERRL, no conjunctival injection, no scleral icterus  MOUTH/THROAT:  MMM  NECK: Trachea midline, no JVD  CV: Normal S1, S2; no audible murmurs  RESP: normal work of breathing; CTAB   ABD: soft, non-distended; non-tender to palpation   : Deferred  MSK/EXT: no LE edema, no limited ROM, DP pulse 2+ b/l   SKIN: No rashes on exposed skin surfaces  NEURO: Moves all extremities spontaneously with no focal deficits, sensation intact to light touch, CN III-XII grossly intact. speech is appropriate  PSYCH: calm interactive, AOX1 to self

## 2025-04-11 NOTE — ED ADULT NURSE NOTE - NSFALLRISKINTERV_ED_ALL_ED

## 2025-04-11 NOTE — H&P ADULT - PROBLEM SELECTOR PLAN 5
- Diet: DASH (easy to chew, although as precaution as on regular diet at NH)  - DVT PPx: Lovenox   - Code status: Full code (GOC on 4/11)  - Dispo: Pending clinical course    Plan of care reviewed with pt's son and aide at bedside. Expressed understanding and agreement. All questions answered.

## 2025-04-11 NOTE — H&P ADULT - PROBLEM SELECTOR PLAN 1
- reports of increasing agitation since 6 PM on 4/10, pt verbally and physically abusive at times with aide. s/p haldol 2.5mg x2 in the ED  - CXR negative, UA negative, covid/flu/rsv negative (expand to full rvp).   - PT eval   - safety watch for now. Currently AAox1 (name), baseline AAOx2 (would know location, per aide = does not know dates/years/situation even when at baseline)   - c/w home regimen: depakote 250mg TID, seroquel 100mg qhs, and aricept 10mg daily for now  - psych consulted; recs appreciated - reports of increasing agitation since 6 PM on 4/10, pt verbally and physically abusive at times with aide. s/p haldol 2.5mg x2 in the ED  - CXR negative, UA negative, covid/flu/rsv negative (expand to full rvp).   - PT eval   - safety watch for now. Currently AAox1 (name), baseline AAOx2 (would know location, per aide = does not know dates/years/situation even when at baseline)   - c/w home regimen: depakote 250mg TID, seroquel 100mg qhs, and aricept 10mg daily for now  - psych consulted; recs appreciated  - check b12, folate, ammonia, tsh

## 2025-04-11 NOTE — ED ADULT NURSE REASSESSMENT NOTE - NS ED NURSE REASSESS COMMENT FT1
Patient A&OX2 Patient denies fevers, chills, chest pain, sob, headache, dizziness, blurry vision, abdominal pain, n/v/d, and urinary symptoms. Respirations even and unlabored. Care plan continued, comfort measurers provided, safety maintained.
Patient became verbally aggressive towards his aid saying that she "had a knife and wanted to kill him", MD made aware, medicated as ordered. Plan of care on going, safety maintained.
break cover RN. received report from  stuart LOZA. Pt is a/o x1 to self. Pt resting in  beds, RR even and unlabored, spo2 maintaining on room air. Repeat EKG sone due to bradycardia. .  20g iv placed to L AC with no redness or swelling noted.

## 2025-04-12 LAB
ALBUMIN SERPL ELPH-MCNC: 3.2 G/DL — LOW (ref 3.3–5)
ALP SERPL-CCNC: 69 U/L — SIGNIFICANT CHANGE UP (ref 40–120)
ALT FLD-CCNC: 14 U/L — SIGNIFICANT CHANGE UP (ref 4–41)
ANION GAP SERPL CALC-SCNC: 9 MMOL/L — SIGNIFICANT CHANGE UP (ref 7–14)
AST SERPL-CCNC: 22 U/L — SIGNIFICANT CHANGE UP (ref 4–40)
BASOPHILS # BLD AUTO: 0.05 K/UL — SIGNIFICANT CHANGE UP (ref 0–0.2)
BASOPHILS NFR BLD AUTO: 0.8 % — SIGNIFICANT CHANGE UP (ref 0–2)
BILIRUB SERPL-MCNC: 0.6 MG/DL — SIGNIFICANT CHANGE UP (ref 0.2–1.2)
BUN SERPL-MCNC: 15 MG/DL — SIGNIFICANT CHANGE UP (ref 7–23)
CALCIUM SERPL-MCNC: 8.6 MG/DL — SIGNIFICANT CHANGE UP (ref 8.4–10.5)
CHLORIDE SERPL-SCNC: 108 MMOL/L — HIGH (ref 98–107)
CO2 SERPL-SCNC: 23 MMOL/L — SIGNIFICANT CHANGE UP (ref 22–31)
CREAT SERPL-MCNC: 1.13 MG/DL — SIGNIFICANT CHANGE UP (ref 0.5–1.3)
EGFR: 65 ML/MIN/1.73M2 — SIGNIFICANT CHANGE UP
EGFR: 65 ML/MIN/1.73M2 — SIGNIFICANT CHANGE UP
EOSINOPHIL # BLD AUTO: 0.23 K/UL — SIGNIFICANT CHANGE UP (ref 0–0.5)
EOSINOPHIL NFR BLD AUTO: 3.5 % — SIGNIFICANT CHANGE UP (ref 0–6)
FOLATE SERPL-MCNC: 13.2 NG/ML — SIGNIFICANT CHANGE UP (ref 3.1–17.5)
GLUCOSE SERPL-MCNC: 79 MG/DL — SIGNIFICANT CHANGE UP (ref 70–99)
HCT VFR BLD CALC: 40.5 % — SIGNIFICANT CHANGE UP (ref 39–50)
HCV AB S/CO SERPL IA: 0.24 S/CO — SIGNIFICANT CHANGE UP (ref 0–0.79)
HCV AB SERPL-IMP: SIGNIFICANT CHANGE UP
HGB BLD-MCNC: 13.4 G/DL — SIGNIFICANT CHANGE UP (ref 13–17)
IANC: 3.63 K/UL — SIGNIFICANT CHANGE UP (ref 1.8–7.4)
IMM GRANULOCYTES NFR BLD AUTO: 0.3 % — SIGNIFICANT CHANGE UP (ref 0–0.9)
LYMPHOCYTES # BLD AUTO: 2.1 K/UL — SIGNIFICANT CHANGE UP (ref 1–3.3)
LYMPHOCYTES # BLD AUTO: 32 % — SIGNIFICANT CHANGE UP (ref 13–44)
MCHC RBC-ENTMCNC: 29.8 PG — SIGNIFICANT CHANGE UP (ref 27–34)
MCHC RBC-ENTMCNC: 33.1 G/DL — SIGNIFICANT CHANGE UP (ref 32–36)
MCV RBC AUTO: 90 FL — SIGNIFICANT CHANGE UP (ref 80–100)
MONOCYTES # BLD AUTO: 0.53 K/UL — SIGNIFICANT CHANGE UP (ref 0–0.9)
MONOCYTES NFR BLD AUTO: 8.1 % — SIGNIFICANT CHANGE UP (ref 2–14)
NEUTROPHILS # BLD AUTO: 3.63 K/UL — SIGNIFICANT CHANGE UP (ref 1.8–7.4)
NEUTROPHILS NFR BLD AUTO: 55.3 % — SIGNIFICANT CHANGE UP (ref 43–77)
NRBC # BLD AUTO: 0 K/UL — SIGNIFICANT CHANGE UP (ref 0–0)
NRBC # FLD: 0 K/UL — SIGNIFICANT CHANGE UP (ref 0–0)
NRBC BLD AUTO-RTO: 0 /100 WBCS — SIGNIFICANT CHANGE UP (ref 0–0)
PLATELET # BLD AUTO: 180 K/UL — SIGNIFICANT CHANGE UP (ref 150–400)
POTASSIUM SERPL-MCNC: 4 MMOL/L — SIGNIFICANT CHANGE UP (ref 3.5–5.3)
POTASSIUM SERPL-SCNC: 4 MMOL/L — SIGNIFICANT CHANGE UP (ref 3.5–5.3)
PROT SERPL-MCNC: 6 G/DL — SIGNIFICANT CHANGE UP (ref 6–8.3)
RBC # BLD: 4.5 M/UL — SIGNIFICANT CHANGE UP (ref 4.2–5.8)
RBC # FLD: 14 % — SIGNIFICANT CHANGE UP (ref 10.3–14.5)
SODIUM SERPL-SCNC: 140 MMOL/L — SIGNIFICANT CHANGE UP (ref 135–145)
VALPROATE SERPL-MCNC: 31.2 UG/ML — LOW (ref 50–100)
VIT B12 SERPL-MCNC: 521 PG/ML — SIGNIFICANT CHANGE UP (ref 200–900)
WBC # BLD: 6.56 K/UL — SIGNIFICANT CHANGE UP (ref 3.8–10.5)
WBC # FLD AUTO: 6.56 K/UL — SIGNIFICANT CHANGE UP (ref 3.8–10.5)

## 2025-04-12 PROCEDURE — 99232 SBSQ HOSP IP/OBS MODERATE 35: CPT

## 2025-04-12 PROCEDURE — 90792 PSYCH DIAG EVAL W/MED SRVCS: CPT

## 2025-04-12 PROCEDURE — 93306 TTE W/DOPPLER COMPLETE: CPT | Mod: 26

## 2025-04-12 RX ORDER — OLANZAPINE 10 MG/1
1.25 TABLET ORAL EVERY 6 HOURS
Refills: 0 | Status: DISCONTINUED | OUTPATIENT
Start: 2025-04-12 | End: 2025-04-15

## 2025-04-12 RX ADMIN — Medication 1 DROP(S): at 18:25

## 2025-04-12 RX ADMIN — ENOXAPARIN SODIUM 40 MILLIGRAM(S): 100 INJECTION SUBCUTANEOUS at 22:18

## 2025-04-12 RX ADMIN — Medication 250 MILLIGRAM(S): at 10:40

## 2025-04-12 RX ADMIN — Medication 1000 UNIT(S): at 13:48

## 2025-04-12 RX ADMIN — Medication 250 MILLIGRAM(S): at 13:46

## 2025-04-12 RX ADMIN — QUETIAPINE FUMARATE 100 MILLIGRAM(S): 25 TABLET ORAL at 22:19

## 2025-04-12 RX ADMIN — Medication 6 MILLIGRAM(S): at 01:14

## 2025-04-12 RX ADMIN — Medication 1 DROP(S): at 05:42

## 2025-04-12 RX ADMIN — Medication 250 MILLIGRAM(S): at 18:25

## 2025-04-12 RX ADMIN — AMLODIPINE BESYLATE 5 MILLIGRAM(S): 10 TABLET ORAL at 05:41

## 2025-04-12 RX ADMIN — DONEPEZIL HYDROCHLORIDE 10 MILLIGRAM(S): 5 TABLET ORAL at 13:48

## 2025-04-12 RX ADMIN — Medication 6 MILLIGRAM(S): at 22:19

## 2025-04-12 RX ADMIN — QUETIAPINE FUMARATE 100 MILLIGRAM(S): 25 TABLET ORAL at 01:14

## 2025-04-12 NOTE — PHYSICAL THERAPY INITIAL EVALUATION ADULT - GENERAL OBSERVATIONS, REHAB EVAL
Consult received, chart reviewed. Patient received in bed, no apparent distress, +tele, aide present. Pt. agreeable to participate in PT.

## 2025-04-12 NOTE — BH CONSULTATION LIAISON ASSESSMENT NOTE - NSBHATTESTAPPAMEND_PSY_A_CORE
I have personally seen and examined this patient. I fully participated in the care of this patient. I have made amendments to the documentation where appropriate and otherwise agree with the history, physical exam, and plan as documented by the ONEL

## 2025-04-12 NOTE — PHYSICAL THERAPY INITIAL EVALUATION ADULT - ADDITIONAL COMMENTS
Pt. ambulates with rolling walker and assist, pt. requires assistance with ADLs and has aide 12 hours/day for 7 days/week. Pt. has Physical Therapy 2x/week.    Pt. was left in bed post PT Evaluation, no apparent distress, all lines intact, HR 78 bpm, aide present, +bed alarm. RN made aware of pt. status and participation in PT.

## 2025-04-12 NOTE — BH CONSULTATION LIAISON ASSESSMENT NOTE - HPI (INCLUDE ILLNESS QUALITY, SEVERITY, DURATION, TIMING, CONTEXT, MODIFYING FACTORS, ASSOCIATED SIGNS AND SYMPTOMS)
Patient is a 85 y/o male with a PmHx of Dementia (AOX2 baseline), Cataracts (s/p cataract surgery on 1/20/24), HTN, Xerotic Dermatitis. Patient living at LTCF the [facility]. Patient with Aide at bedside. Known h/o agitation/insomnia and change in behavior last admission to [facility] in Feb 2024, p/w agitation at LTC for 2 days. Spoke with aide at bedside - as per aide who is with patient daily, patient has been doing very well on current medication regime. States he has been without agitations since last admission up until Wednesday. States patient has been eating well, sleeping at night, having regular BMs, no reports of pain. NO obvious trigger. Psychiatry called for medication management of agitation.    Patient was seen and assessed at bedside. Patient is awake, alert, oriented to name and place, unable to tell provider year or date. Patient is calm, cooperative and respectful to provider, saying please and thank you often. Unable to recall events leading to admission. Patient feels safe here and feels well cared for. Has no SI or HI. reports mood is good. Sleeping well, good appetite. No rigidity felt on exam. Denies psychosis.

## 2025-04-12 NOTE — PHYSICAL THERAPY INITIAL EVALUATION ADULT - PERTINENT HX OF CURRENT PROBLEM, REHAB EVAL
Pt. presented for agitation. Per radiology report, CT head: Mild chronic microvascular changes without evidence of an acute transcortical infarction or hemorrhage.

## 2025-04-12 NOTE — PHYSICAL THERAPY INITIAL EVALUATION ADULT - GAIT DEVIATIONS NOTED, PT EVAL
Two Day Calorie Count Results:    Day 1: 220 kcal, 14 g protein (consumed yogurt during breakfast, Glucerna during lunch, no intake data noted for dinner)    Day 2: no intake data noted decreased afshan/decreased step length/decreased stride length

## 2025-04-12 NOTE — BH CONSULTATION LIAISON ASSESSMENT NOTE - SUMMARY
Patient is a 83 y/o male with a PmHx of Dementia (AOX2 baseline), Cataracts (s/p cataract surgery on 1/20/24), HTN, Xerotic Dermatitis. Patient living at LTCF the [facility]. Patient with Aide at bedside. Known h/o agitation/insomnia and change in behavior last admission to [facility] in Feb 2024, p/w agitation at LTC for 2 days. Spoke with aide at bedside - as per aide who is with patient daily, patient has been doing very well on current medication regime. States he has been without agitations since last admission up until Wednesday. States patient has been eating well, sleeping at night, having regular BMs, no reports of pain. NO obvious trigger. Psychiatry called for medication management of agitation.    Plan:   - observation to be determined by primary team, no SI or HI, has hx of agitation  - Delirium w/u and medical stabilization as you are (f/u labs ( b12 WNL, folate WNL, tsh WNL), bladder scan)  - CONTINUE Home meds  -- Depakote 250mg TID  --- VPA level corrected with albumin 3.2 is 62.4  -- Seroquel 100mg qhs   - PRN for agitation: Zyprexa 1.25mg q6hrs  - Dispo: no psychiatric contraindications for dc - CL will follow and monitor need for PRNs as well as behavior

## 2025-04-12 NOTE — BH CONSULTATION LIAISON ASSESSMENT NOTE - CURRENT MEDICATION
MEDICATIONS  (STANDING):  amLODIPine   Tablet 5 milliGRAM(s) Oral daily  artificial  tears Solution 1 Drop(s) Both EYES two times a day  cholecalciferol 1000 Unit(s) Oral daily  divalproex  milliGRAM(s) Oral <User Schedule>  donepezil 10 milliGRAM(s) Oral daily  enoxaparin Injectable 40 milliGRAM(s) SubCutaneous every 24 hours  melatonin 6 milliGRAM(s) Oral at bedtime  QUEtiapine 100 milliGRAM(s) Oral at bedtime    MEDICATIONS  (PRN):  acetaminophen     Tablet .. 650 milliGRAM(s) Oral every 6 hours PRN Temp greater or equal to 38C (100.4F), Mild Pain (1 - 3)  albuterol    90 MICROgram(s) HFA Inhaler 2 Puff(s) Inhalation every 6 hours PRN Shortness of Breath and/or Wheezing

## 2025-04-12 NOTE — BH CONSULTATION LIAISON ASSESSMENT NOTE - NSBHCHARTREVIEWVS_PSY_A_CORE FT
Vital Signs Last 24 Hrs  T(C): 36.4 (12 Apr 2025 05:40), Max: 37.1 (11 Apr 2025 23:30)  T(F): 97.5 (12 Apr 2025 05:40), Max: 98.7 (11 Apr 2025 23:30)  HR: 69 (12 Apr 2025 05:40) (51 - 69)  BP: 111/74 (12 Apr 2025 05:40) (111/74 - 168/95)  BP(mean): --  RR: 17 (12 Apr 2025 05:40) (12 - 18)  SpO2: 97% (12 Apr 2025 05:40) (97% - 98%)    Parameters below as of 12 Apr 2025 05:40  Patient On (Oxygen Delivery Method): room air

## 2025-04-12 NOTE — BH CONSULTATION LIAISON ASSESSMENT NOTE - NSBHCHARTREVIEWLAB_PSY_A_CORE FT
13.4   6.56  )-----------( 180      ( 12 Apr 2025 04:50 )             40.5   04-12    140  |  108[H]  |  15  ----------------------------<  79  4.0   |  23  |  1.13    Ca    8.6      12 Apr 2025 04:50  Phos  3.0     04-11  Mg     2.30     04-11    TPro  6.0  /  Alb  3.2[L]  /  TBili  0.6  /  DBili  x   /  AST  22  /  ALT  14  /  AlkPhos  69  04-12

## 2025-04-12 NOTE — BH CONSULTATION LIAISON ASSESSMENT NOTE - NSBHATTESTCOMMENTATTENDFT_PSY_A_CORE
Met with the patient via tele-platform on 4/12/2025 along with ACP, impression and plan discussed and agreed upon

## 2025-04-13 LAB
ANION GAP SERPL CALC-SCNC: 10 MMOL/L — SIGNIFICANT CHANGE UP (ref 7–14)
BASOPHILS # BLD AUTO: 0.04 K/UL — SIGNIFICANT CHANGE UP (ref 0–0.2)
BASOPHILS NFR BLD AUTO: 0.6 % — SIGNIFICANT CHANGE UP (ref 0–2)
BUN SERPL-MCNC: 16 MG/DL — SIGNIFICANT CHANGE UP (ref 7–23)
CALCIUM SERPL-MCNC: 8.6 MG/DL — SIGNIFICANT CHANGE UP (ref 8.4–10.5)
CHLORIDE SERPL-SCNC: 107 MMOL/L — SIGNIFICANT CHANGE UP (ref 98–107)
CK MB BLD-MCNC: 2.6 % — HIGH (ref 0–2.5)
CK MB CFR SERPL CALC: 4 NG/ML — SIGNIFICANT CHANGE UP
CK SERPL-CCNC: 153 U/L — SIGNIFICANT CHANGE UP (ref 30–200)
CO2 SERPL-SCNC: 23 MMOL/L — SIGNIFICANT CHANGE UP (ref 22–31)
CREAT SERPL-MCNC: 1.12 MG/DL — SIGNIFICANT CHANGE UP (ref 0.5–1.3)
EGFR: 66 ML/MIN/1.73M2 — SIGNIFICANT CHANGE UP
EGFR: 66 ML/MIN/1.73M2 — SIGNIFICANT CHANGE UP
EOSINOPHIL # BLD AUTO: 0.29 K/UL — SIGNIFICANT CHANGE UP (ref 0–0.5)
EOSINOPHIL NFR BLD AUTO: 4.4 % — SIGNIFICANT CHANGE UP (ref 0–6)
GLUCOSE SERPL-MCNC: 65 MG/DL — LOW (ref 70–99)
HCT VFR BLD CALC: 43.4 % — SIGNIFICANT CHANGE UP (ref 39–50)
HGB BLD-MCNC: 14.2 G/DL — SIGNIFICANT CHANGE UP (ref 13–17)
IANC: 3.42 K/UL — SIGNIFICANT CHANGE UP (ref 1.8–7.4)
IMM GRANULOCYTES NFR BLD AUTO: 0.3 % — SIGNIFICANT CHANGE UP (ref 0–0.9)
LYMPHOCYTES # BLD AUTO: 2.16 K/UL — SIGNIFICANT CHANGE UP (ref 1–3.3)
LYMPHOCYTES # BLD AUTO: 33 % — SIGNIFICANT CHANGE UP (ref 13–44)
MAGNESIUM SERPL-MCNC: 2.1 MG/DL — SIGNIFICANT CHANGE UP (ref 1.6–2.6)
MCHC RBC-ENTMCNC: 30.1 PG — SIGNIFICANT CHANGE UP (ref 27–34)
MCHC RBC-ENTMCNC: 32.7 G/DL — SIGNIFICANT CHANGE UP (ref 32–36)
MCV RBC AUTO: 92.1 FL — SIGNIFICANT CHANGE UP (ref 80–100)
MONOCYTES # BLD AUTO: 0.61 K/UL — SIGNIFICANT CHANGE UP (ref 0–0.9)
MONOCYTES NFR BLD AUTO: 9.3 % — SIGNIFICANT CHANGE UP (ref 2–14)
NEUTROPHILS # BLD AUTO: 3.42 K/UL — SIGNIFICANT CHANGE UP (ref 1.8–7.4)
NEUTROPHILS NFR BLD AUTO: 52.4 % — SIGNIFICANT CHANGE UP (ref 43–77)
NRBC # BLD AUTO: 0 K/UL — SIGNIFICANT CHANGE UP (ref 0–0)
NRBC # FLD: 0 K/UL — SIGNIFICANT CHANGE UP (ref 0–0)
NRBC BLD AUTO-RTO: 0 /100 WBCS — SIGNIFICANT CHANGE UP (ref 0–0)
PHOSPHATE SERPL-MCNC: 3.4 MG/DL — SIGNIFICANT CHANGE UP (ref 2.5–4.5)
PLATELET # BLD AUTO: 193 K/UL — SIGNIFICANT CHANGE UP (ref 150–400)
POTASSIUM SERPL-MCNC: 4.1 MMOL/L — SIGNIFICANT CHANGE UP (ref 3.5–5.3)
POTASSIUM SERPL-SCNC: 4.1 MMOL/L — SIGNIFICANT CHANGE UP (ref 3.5–5.3)
RBC # BLD: 4.71 M/UL — SIGNIFICANT CHANGE UP (ref 4.2–5.8)
RBC # FLD: 14.1 % — SIGNIFICANT CHANGE UP (ref 10.3–14.5)
SODIUM SERPL-SCNC: 140 MMOL/L — SIGNIFICANT CHANGE UP (ref 135–145)
TROPONIN T, HIGH SENSITIVITY RESULT: 30 NG/L — SIGNIFICANT CHANGE UP
WBC # BLD: 6.54 K/UL — SIGNIFICANT CHANGE UP (ref 3.8–10.5)
WBC # FLD AUTO: 6.54 K/UL — SIGNIFICANT CHANGE UP (ref 3.8–10.5)

## 2025-04-13 PROCEDURE — 71045 X-RAY EXAM CHEST 1 VIEW: CPT | Mod: 26

## 2025-04-13 PROCEDURE — 93010 ELECTROCARDIOGRAM REPORT: CPT

## 2025-04-13 PROCEDURE — 99232 SBSQ HOSP IP/OBS MODERATE 35: CPT

## 2025-04-13 PROCEDURE — 74018 RADEX ABDOMEN 1 VIEW: CPT | Mod: 26

## 2025-04-13 RX ORDER — POLYETHYLENE GLYCOL 3350 17 G/17G
17 POWDER, FOR SOLUTION ORAL DAILY
Refills: 0 | Status: DISCONTINUED | OUTPATIENT
Start: 2025-04-13 | End: 2025-04-15

## 2025-04-13 RX ORDER — BISACODYL 5 MG
5 TABLET, DELAYED RELEASE (ENTERIC COATED) ORAL EVERY 12 HOURS
Refills: 0 | Status: DISCONTINUED | OUTPATIENT
Start: 2025-04-13 | End: 2025-04-15

## 2025-04-13 RX ADMIN — POLYETHYLENE GLYCOL 3350 17 GRAM(S): 17 POWDER, FOR SOLUTION ORAL at 21:19

## 2025-04-13 RX ADMIN — Medication 20 MILLIGRAM(S): at 21:19

## 2025-04-13 RX ADMIN — Medication 1 DROP(S): at 17:19

## 2025-04-13 RX ADMIN — Medication 6 MILLIGRAM(S): at 21:21

## 2025-04-13 RX ADMIN — AMLODIPINE BESYLATE 5 MILLIGRAM(S): 10 TABLET ORAL at 05:19

## 2025-04-13 RX ADMIN — ENOXAPARIN SODIUM 40 MILLIGRAM(S): 100 INJECTION SUBCUTANEOUS at 21:20

## 2025-04-13 RX ADMIN — Medication 1000 UNIT(S): at 11:19

## 2025-04-13 RX ADMIN — QUETIAPINE FUMARATE 100 MILLIGRAM(S): 25 TABLET ORAL at 21:22

## 2025-04-13 RX ADMIN — Medication 250 MILLIGRAM(S): at 12:26

## 2025-04-13 RX ADMIN — DONEPEZIL HYDROCHLORIDE 10 MILLIGRAM(S): 5 TABLET ORAL at 11:19

## 2025-04-13 RX ADMIN — Medication 250 MILLIGRAM(S): at 17:18

## 2025-04-13 RX ADMIN — Medication 250 MILLIGRAM(S): at 09:18

## 2025-04-13 RX ADMIN — Medication 1 DROP(S): at 05:19

## 2025-04-13 NOTE — CHART NOTE - NSCHARTNOTEFT_GEN_A_CORE
Night Coverage Med ACP      Notified by RN pt c/o SOB and CP VSS placed on 2L nc for comfort, EKG ordered.  Pt seen and assessed at bedside appears to be comfortable in no apparent distress on 2 L nc, denies CP at the time of assessment however was feeling slightly SOB and now feeling better. Pain not reproducible upon palpation, however abd distention noted without tenderness.  As per aid at bedside pt did not move his bowel for the past 2 days, and pt states he feels constipated. Ordered laxatives and famotidine will monitor.  EKG no change from previous EKG.      ICU Vital Signs Last 24 Hrs  T(C): 36.3 (13 Apr 2025 20:00), Max: 37.2 (13 Apr 2025 17:13)  T(F): 97.4 (13 Apr 2025 20:00), Max: 98.9 (13 Apr 2025 17:13)  HR: 68 (13 Apr 2025 20:00) (36 - 77)  BP: 146/77 (13 Apr 2025 20:00) (108/74 - 146/77)  RR: 20 (13 Apr 2025 20:00) (16 - 20)  SpO2: 100% (13 Apr 2025 20:00) (96% - 100%)    O2 Parameters below as of 13 Apr 2025 20:00      PHYSICAL EXAM:  GENERAL: No acute distress, well-developed  HEAD:  Atraumatic, Normocephalic  NECK: Supple, no lymphadenopathy, no JVD  CHEST/LUNG: CTAB; No wheezes, rales, or rhonchi  HEART:  No murmurs, rubs, or gallops  ABDOMEN:Distended  EXTREMITIES:  2+ peripheral pulses b/l, No clubbing, cyanosis, or edema  NEUROLOGY: A&O x 2, no focal deficits            Christa CARTAGENAC Night Coverage Med ACP      Notified by RN pt c/o SOB and CP VSS placed on 2L nc for comfort, EKG ordered.  Pt seen and assessed at bedside appears to be comfortable in no apparent distress on 2 L nc, denies CP at the time of assessment however was feeling slightly SOB and now feeling better. Pain not reproducible upon palpation, however abd distention noted without tenderness.  As per aid at bedside pt did not move his bowel for the past 2 days, and pt states he feels constipated. Ordered laxatives and famotidine will monitor.  EKG no change from previous EKG.      ICU Vital Signs Last 24 Hrs  T(C): 36.3 (13 Apr 2025 20:00), Max: 37.2 (13 Apr 2025 17:13)  T(F): 97.4 (13 Apr 2025 20:00), Max: 98.9 (13 Apr 2025 17:13)  HR: 68 (13 Apr 2025 20:00) (36 - 77)  BP: 146/77 (13 Apr 2025 20:00) (108/74 - 146/77)  RR: 20 (13 Apr 2025 20:00) (16 - 20)  SpO2: 100% (13 Apr 2025 20:00) (96% - 100%)    O2 Parameters below as of 13 Apr 2025 20:00      PHYSICAL EXAM:  GENERAL: No acute distress, well-developed  HEAD:  Atraumatic, Normocephalic  NECK: Supple, no lymphadenopathy, no JVD  CHEST/LUNG: CTAB; No wheezes, rales, or rhonchi  HEART:  No murmurs, rubs, or gallops  ABDOMEN:Distended  EXTREMITIES:  2+ peripheral pulses b/l, No clubbing, cyanosis, or edema  NEUROLOGY: A&O x 2, no focal deficits    Plan:  EKG  CE  CXR and Abd Xray  2Lnc  Famotidine 20 mg IVP x 1 and 20 po BID  Miralax and dulcolax PRN          Christa TRAYLOR-C Night Coverage Med ACP      Notified by RN pt c/o SOB and CP VSS placed on 2L nc for comfort, EKG ordered.  Pt seen and assessed at bedside appears to be comfortable in no apparent distress on 2 L nc, denies CP at the time of assessment however was feeling slightly SOB and now feeling better. Pain not reproducible upon palpation, however abd distention noted without tenderness.  As per aid at bedside pt did not move his bowel for the past 2 days, and pt states he feels constipated. Ordered laxatives and famotidine will monitor.  EKG no change from previous EKG.      ICU Vital Signs Last 24 Hrs  T(C): 36.3 (13 Apr 2025 20:00), Max: 37.2 (13 Apr 2025 17:13)  T(F): 97.4 (13 Apr 2025 20:00), Max: 98.9 (13 Apr 2025 17:13)  HR: 68 (13 Apr 2025 20:00) (36 - 77)  BP: 146/77 (13 Apr 2025 20:00) (108/74 - 146/77)  RR: 20 (13 Apr 2025 20:00) (16 - 20)  SpO2: 100% (13 Apr 2025 20:00) (96% - 100%)    O2 Parameters below as of 13 Apr 2025 20:00      PHYSICAL EXAM:  GENERAL: No acute distress, well-developed  HEAD:  Atraumatic, Normocephalic  NECK: Supple, no lymphadenopathy, no JVD  CHEST/LUNG: CTAB; No wheezes, rales, or rhonchi  HEART:  No murmurs, rubs, or gallops  ABDOMEN:Distended  EXTREMITIES:  2+ peripheral pulses b/l, No clubbing, cyanosis, or edema  NEUROLOGY: A&O x 2, no focal deficits    Plan:  EKG  CE  CXR and Abd Xray  2Lnc  Famotidine 20 mg IVP x 1 and 20 po BID  Miralax and dulcolax PRN    Re-Assessment:  Pt reports he feels better, denies SOB/CP        Christa ACOSTAP-C

## 2025-04-14 DIAGNOSIS — F05 DELIRIUM DUE TO KNOWN PHYSIOLOGICAL CONDITION: ICD-10-CM

## 2025-04-14 LAB
ANION GAP SERPL CALC-SCNC: 11 MMOL/L — SIGNIFICANT CHANGE UP (ref 7–14)
BASOPHILS # BLD AUTO: 0.02 K/UL — SIGNIFICANT CHANGE UP (ref 0–0.2)
BASOPHILS NFR BLD AUTO: 0.2 % — SIGNIFICANT CHANGE UP (ref 0–2)
BUN SERPL-MCNC: 13 MG/DL — SIGNIFICANT CHANGE UP (ref 7–23)
CALCIUM SERPL-MCNC: 8.7 MG/DL — SIGNIFICANT CHANGE UP (ref 8.4–10.5)
CHLORIDE SERPL-SCNC: 107 MMOL/L — SIGNIFICANT CHANGE UP (ref 98–107)
CO2 SERPL-SCNC: 21 MMOL/L — LOW (ref 22–31)
CREAT SERPL-MCNC: 1.09 MG/DL — SIGNIFICANT CHANGE UP (ref 0.5–1.3)
EGFR: 68 ML/MIN/1.73M2 — SIGNIFICANT CHANGE UP
EGFR: 68 ML/MIN/1.73M2 — SIGNIFICANT CHANGE UP
EOSINOPHIL # BLD AUTO: 0.2 K/UL — SIGNIFICANT CHANGE UP (ref 0–0.5)
EOSINOPHIL NFR BLD AUTO: 2.4 % — SIGNIFICANT CHANGE UP (ref 0–6)
GLUCOSE SERPL-MCNC: 77 MG/DL — SIGNIFICANT CHANGE UP (ref 70–99)
HCT VFR BLD CALC: 42.6 % — SIGNIFICANT CHANGE UP (ref 39–50)
HGB BLD-MCNC: 14.3 G/DL — SIGNIFICANT CHANGE UP (ref 13–17)
IANC: 5.39 K/UL — SIGNIFICANT CHANGE UP (ref 1.8–7.4)
IMM GRANULOCYTES NFR BLD AUTO: 0.2 % — SIGNIFICANT CHANGE UP (ref 0–0.9)
LYMPHOCYTES # BLD AUTO: 2.04 K/UL — SIGNIFICANT CHANGE UP (ref 1–3.3)
LYMPHOCYTES # BLD AUTO: 24.2 % — SIGNIFICANT CHANGE UP (ref 13–44)
MAGNESIUM SERPL-MCNC: 2 MG/DL — SIGNIFICANT CHANGE UP (ref 1.6–2.6)
MCHC RBC-ENTMCNC: 29.7 PG — SIGNIFICANT CHANGE UP (ref 27–34)
MCHC RBC-ENTMCNC: 33.6 G/DL — SIGNIFICANT CHANGE UP (ref 32–36)
MCV RBC AUTO: 88.6 FL — SIGNIFICANT CHANGE UP (ref 80–100)
MONOCYTES # BLD AUTO: 0.76 K/UL — SIGNIFICANT CHANGE UP (ref 0–0.9)
MONOCYTES NFR BLD AUTO: 9 % — SIGNIFICANT CHANGE UP (ref 2–14)
NEUTROPHILS # BLD AUTO: 5.39 K/UL — SIGNIFICANT CHANGE UP (ref 1.8–7.4)
NEUTROPHILS NFR BLD AUTO: 64 % — SIGNIFICANT CHANGE UP (ref 43–77)
NRBC # BLD AUTO: 0 K/UL — SIGNIFICANT CHANGE UP (ref 0–0)
NRBC # FLD: 0 K/UL — SIGNIFICANT CHANGE UP (ref 0–0)
NRBC BLD AUTO-RTO: 0 /100 WBCS — SIGNIFICANT CHANGE UP (ref 0–0)
PHOSPHATE SERPL-MCNC: 3.1 MG/DL — SIGNIFICANT CHANGE UP (ref 2.5–4.5)
PLATELET # BLD AUTO: 206 K/UL — SIGNIFICANT CHANGE UP (ref 150–400)
POTASSIUM SERPL-MCNC: 3.9 MMOL/L — SIGNIFICANT CHANGE UP (ref 3.5–5.3)
POTASSIUM SERPL-SCNC: 3.9 MMOL/L — SIGNIFICANT CHANGE UP (ref 3.5–5.3)
RBC # BLD: 4.81 M/UL — SIGNIFICANT CHANGE UP (ref 4.2–5.8)
RBC # FLD: 13.6 % — SIGNIFICANT CHANGE UP (ref 10.3–14.5)
SODIUM SERPL-SCNC: 139 MMOL/L — SIGNIFICANT CHANGE UP (ref 135–145)
WBC # BLD: 8.43 K/UL — SIGNIFICANT CHANGE UP (ref 3.8–10.5)
WBC # FLD AUTO: 8.43 K/UL — SIGNIFICANT CHANGE UP (ref 3.8–10.5)

## 2025-04-14 PROCEDURE — 99232 SBSQ HOSP IP/OBS MODERATE 35: CPT

## 2025-04-14 PROCEDURE — 99232 SBSQ HOSP IP/OBS MODERATE 35: CPT | Mod: FS

## 2025-04-14 RX ORDER — HALOPERIDOL 10 MG/1
2.5 TABLET ORAL ONCE
Refills: 0 | Status: COMPLETED | OUTPATIENT
Start: 2025-04-14 | End: 2025-04-14

## 2025-04-14 RX ADMIN — Medication 6 MILLIGRAM(S): at 22:11

## 2025-04-14 RX ADMIN — HALOPERIDOL 2.5 MILLIGRAM(S): 10 TABLET ORAL at 02:30

## 2025-04-14 RX ADMIN — Medication 40 MILLIGRAM(S): at 17:38

## 2025-04-14 RX ADMIN — QUETIAPINE FUMARATE 100 MILLIGRAM(S): 25 TABLET ORAL at 22:11

## 2025-04-14 RX ADMIN — Medication 1 DROP(S): at 17:43

## 2025-04-14 RX ADMIN — OLANZAPINE 1.25 MILLIGRAM(S): 10 TABLET ORAL at 01:52

## 2025-04-14 RX ADMIN — Medication 250 MILLIGRAM(S): at 09:51

## 2025-04-14 RX ADMIN — DONEPEZIL HYDROCHLORIDE 10 MILLIGRAM(S): 5 TABLET ORAL at 12:11

## 2025-04-14 RX ADMIN — Medication 40 MILLIGRAM(S): at 05:29

## 2025-04-14 RX ADMIN — AMLODIPINE BESYLATE 5 MILLIGRAM(S): 10 TABLET ORAL at 05:29

## 2025-04-14 RX ADMIN — Medication 1 DROP(S): at 05:29

## 2025-04-14 RX ADMIN — ENOXAPARIN SODIUM 40 MILLIGRAM(S): 100 INJECTION SUBCUTANEOUS at 22:11

## 2025-04-14 RX ADMIN — Medication 250 MILLIGRAM(S): at 17:38

## 2025-04-14 RX ADMIN — Medication 250 MILLIGRAM(S): at 12:11

## 2025-04-14 RX ADMIN — Medication 1000 UNIT(S): at 12:11

## 2025-04-14 NOTE — BH CONSULTATION LIAISON PROGRESS NOTE - NSBHATTESTCOMMENTATTENDFT_PSY_A_CORE
Chart reviewed, seen/evaluated with the student, agree with above assessment/plan. Patient AAOX1, pleasantly confused, calm/polite, no psychosis, denies si and hi, no stiffness or rigidity on exam. Care coordinated with pt.'s aide at bedside.  Plan as above.

## 2025-04-14 NOTE — BH CONSULTATION LIAISON PROGRESS NOTE - OTHER
concrete limited, dementia  limited variable, calm at the time of eval.  poverty of content concrete/confused/impoverished

## 2025-04-14 NOTE — BH CONSULTATION LIAISON PROGRESS NOTE - NSBHFUPINTERVALHXFT_PSY_A_CORE
Medical work up ongoing, placed on 2L NC ON. ON on 04/14, Required PO Zyprexa 1.25mg at 1:52 and 2.5 IV haldol at 2:30. Seen this AM with aide. Aide reports PRNS for agitation and confusion ON, though states appears at baseline this AM. Pt calm and cooperative, resting with eyes closed. Oriented to self only. No SI, HI, AH, VH.   Medical work up ongoing, currently on NC.  overnight Required PRN PO Zyprexa 1.25mg at 1:52 and PRN 2.5 IV haldol at 2:30. Seen this AM with aide. Aide reports PRNS for agitation and confusion ON, though states appears at baseline this AM. Pt calm and cooperative, resting with eyes closed. Oriented to self only. No SI, HI, AH, VH.      As per aide from facility, at baseline patient is calm, cooperative, makes his needs known, always knows his name and

## 2025-04-14 NOTE — BH CONSULTATION LIAISON PROGRESS NOTE - NSBHASSESSMENTFT_PSY_ALL_CORE
Patient is a 83 y/o male with a PmHx of Dementia (AOX2 baseline), Cataracts (s/p cataract surgery on 1/20/24), HTN, Xerotic Dermatitis. Patient living at LTCF the [facility]. Patient with Aide at bedside. Known h/o agitation/insomnia and change in behavior last admission to [facility] in Feb 2024, p/w agitation at LTC for 2 days. Spoke with aide at bedside - as per aide who is with patient daily, patient has been doing very well on current medication regime. States he has been without agitations since last admission up until Wednesday. States patient has been eating well, sleeping at night, having regular BMs, no reports of pain. NO obvious trigger. Psychiatry called for medication management of agitation.    04/12: Patient was seen and assessed at bedside. Patient is awake, alert, oriented to name and place, unable to tell provider year or date. Patient is calm, cooperative and respectful to provider, saying please and thank you often. Unable to recall events leading to admission. Patient feels safe here and feels well cared for. Has no SI or HI. reports mood is good. Sleeping well, good appetite. No rigidity felt on exam. Denies psychosis    04/14: Required PO Zyprexa 1.25mg at 1:52 and 2.5 IV haldol at 2:30. Seen this AM with aide. Aide reports PRNS for agitation and confusion ON, though states appears at baseline this AM. Pt calm and cooperative, resting with eyes closed. Oriented to self only. No SI, HI, AH, VH. No rigidity on exam.     Plan:   - observation to be determined by primary team, no SI or HI, has hx of agitation  - Delirium w/u and medical stabilization as you are (f/u labs ( b12 WNL, folate WNL, tsh WNL), bladder scan)  - CONTINUE Home meds  -- Depakote 250mg TID  --- VPA level corrected with albumin 3.2 is 62.4  -- Seroquel 100mg qhs   - PRN for agitation: Zyprexa 1.25mg q6hrs  - Dispo: no psychiatric contraindications for dc - CL will follow and monitor need for PRNs as well as behavior Patient is a 83 y/o male with a PmHx of Dementia (AOX2 baseline), Cataracts (s/p cataract surgery on 1/20/24), HTN, Xerotic Dermatitis. Patient living at LTCF the [facility]. Patient with Aide at bedside. Known h/o agitation/insomnia and change in behavior last admission to [facility] in Feb 2024, p/w agitation at LTC for 2 days. Spoke with aide at bedside - as per aide who is with patient daily, patient has been doing very well on current medication regime. States he has been without agitations since last admission up until Wednesday. States patient has been eating well, sleeping at night, having regular BMs, no reports of pain. NO obvious trigger. Psychiatry called for medication management of agitation.    04/12: Patient was seen and assessed at bedside. Patient is awake, alert, oriented to name and place, unable to tell provider year or date. Patient is calm, cooperative and respectful to provider, saying please and thank you often. Unable to recall events leading to admission. Patient feels safe here and feels well cared for. Has no SI or HI. reports mood is good. Sleeping well, good appetite. No rigidity felt on exam. Denies psychosis    04/14: Required 2 PRNs overnight.  Seen this AM with aide. she reports pt. appears at baseline this AM. Pt calm and cooperative, resting with eyes closed. Oriented to self only. No SI, HI, AH, VH. No rigidity on exam.     Plan:   - observation to be determined by primary team, no SI or HI, has hx of agitation  - Delirium w/u and medical stabilization as you are (f/u labs ( b12 WNL, folate WNL, tsh WNL), bladder scan)  - CONTINUE Home meds  -- Depakote 250mg TID  --- VPA level corrected with albumin 3.2 is 62.4  -- Seroquel 100mg qhs   -Monitor LFTs, platelets, ammonia level and VPA level  - PRN for agitation: Zyprexa 1.25mg q6hrs  - Hold antipsychotics if qtc >500  - Dispo: no psychiatric contraindications for dc - CL will follow and monitor need for PRNs as well as behavior

## 2025-04-15 VITALS
OXYGEN SATURATION: 100 % | RESPIRATION RATE: 18 BRPM | DIASTOLIC BLOOD PRESSURE: 74 MMHG | SYSTOLIC BLOOD PRESSURE: 127 MMHG | TEMPERATURE: 98 F | HEART RATE: 68 BPM

## 2025-04-15 LAB
ANION GAP SERPL CALC-SCNC: 12 MMOL/L — SIGNIFICANT CHANGE UP (ref 7–14)
BASOPHILS # BLD AUTO: 0.02 K/UL — SIGNIFICANT CHANGE UP (ref 0–0.2)
BASOPHILS NFR BLD AUTO: 0.3 % — SIGNIFICANT CHANGE UP (ref 0–2)
BUN SERPL-MCNC: 15 MG/DL — SIGNIFICANT CHANGE UP (ref 7–23)
CALCIUM SERPL-MCNC: 8.6 MG/DL — SIGNIFICANT CHANGE UP (ref 8.4–10.5)
CHLORIDE SERPL-SCNC: 105 MMOL/L — SIGNIFICANT CHANGE UP (ref 98–107)
CO2 SERPL-SCNC: 23 MMOL/L — SIGNIFICANT CHANGE UP (ref 22–31)
CREAT SERPL-MCNC: 1.14 MG/DL — SIGNIFICANT CHANGE UP (ref 0.5–1.3)
EGFR: 64 ML/MIN/1.73M2 — SIGNIFICANT CHANGE UP
EGFR: 64 ML/MIN/1.73M2 — SIGNIFICANT CHANGE UP
EOSINOPHIL # BLD AUTO: 0.29 K/UL — SIGNIFICANT CHANGE UP (ref 0–0.5)
EOSINOPHIL NFR BLD AUTO: 4.1 % — SIGNIFICANT CHANGE UP (ref 0–6)
GLUCOSE SERPL-MCNC: 83 MG/DL — SIGNIFICANT CHANGE UP (ref 70–99)
HCT VFR BLD CALC: 42.3 % — SIGNIFICANT CHANGE UP (ref 39–50)
HGB BLD-MCNC: 14.1 G/DL — SIGNIFICANT CHANGE UP (ref 13–17)
IANC: 3.92 K/UL — SIGNIFICANT CHANGE UP (ref 1.8–7.4)
IMM GRANULOCYTES NFR BLD AUTO: 0.4 % — SIGNIFICANT CHANGE UP (ref 0–0.9)
LYMPHOCYTES # BLD AUTO: 2.12 K/UL — SIGNIFICANT CHANGE UP (ref 1–3.3)
LYMPHOCYTES # BLD AUTO: 30.2 % — SIGNIFICANT CHANGE UP (ref 13–44)
MAGNESIUM SERPL-MCNC: 2.2 MG/DL — SIGNIFICANT CHANGE UP (ref 1.6–2.6)
MCHC RBC-ENTMCNC: 29.8 PG — SIGNIFICANT CHANGE UP (ref 27–34)
MCHC RBC-ENTMCNC: 33.3 G/DL — SIGNIFICANT CHANGE UP (ref 32–36)
MCV RBC AUTO: 89.4 FL — SIGNIFICANT CHANGE UP (ref 80–100)
MONOCYTES # BLD AUTO: 0.64 K/UL — SIGNIFICANT CHANGE UP (ref 0–0.9)
MONOCYTES NFR BLD AUTO: 9.1 % — SIGNIFICANT CHANGE UP (ref 2–14)
NEUTROPHILS # BLD AUTO: 3.92 K/UL — SIGNIFICANT CHANGE UP (ref 1.8–7.4)
NEUTROPHILS NFR BLD AUTO: 55.9 % — SIGNIFICANT CHANGE UP (ref 43–77)
NRBC # BLD AUTO: 0 K/UL — SIGNIFICANT CHANGE UP (ref 0–0)
NRBC # FLD: 0 K/UL — SIGNIFICANT CHANGE UP (ref 0–0)
NRBC BLD AUTO-RTO: 0 /100 WBCS — SIGNIFICANT CHANGE UP (ref 0–0)
PHOSPHATE SERPL-MCNC: 3.3 MG/DL — SIGNIFICANT CHANGE UP (ref 2.5–4.5)
PLATELET # BLD AUTO: 215 K/UL — SIGNIFICANT CHANGE UP (ref 150–400)
POTASSIUM SERPL-MCNC: 4.2 MMOL/L — SIGNIFICANT CHANGE UP (ref 3.5–5.3)
POTASSIUM SERPL-SCNC: 4.2 MMOL/L — SIGNIFICANT CHANGE UP (ref 3.5–5.3)
RBC # BLD: 4.73 M/UL — SIGNIFICANT CHANGE UP (ref 4.2–5.8)
RBC # FLD: 13.5 % — SIGNIFICANT CHANGE UP (ref 10.3–14.5)
SODIUM SERPL-SCNC: 140 MMOL/L — SIGNIFICANT CHANGE UP (ref 135–145)
WBC # BLD: 7.02 K/UL — SIGNIFICANT CHANGE UP (ref 3.8–10.5)
WBC # FLD AUTO: 7.02 K/UL — SIGNIFICANT CHANGE UP (ref 3.8–10.5)

## 2025-04-15 PROCEDURE — 99233 SBSQ HOSP IP/OBS HIGH 50: CPT

## 2025-04-15 PROCEDURE — 99239 HOSP IP/OBS DSCHRG MGMT >30: CPT

## 2025-04-15 RX ORDER — POLYETHYLENE GLYCOL 3350 17 G/17G
17 POWDER, FOR SOLUTION ORAL
Qty: 0 | Refills: 0 | DISCHARGE
Start: 2025-04-15

## 2025-04-15 RX ORDER — BISACODYL 5 MG
1 TABLET, DELAYED RELEASE (ENTERIC COATED) ORAL
Qty: 0 | Refills: 0 | DISCHARGE
Start: 2025-04-15

## 2025-04-15 RX ORDER — MELATONIN 5 MG
2 TABLET ORAL
Qty: 0 | Refills: 0 | DISCHARGE
Start: 2025-04-15

## 2025-04-15 RX ADMIN — Medication 40 MILLIGRAM(S): at 06:03

## 2025-04-15 RX ADMIN — AMLODIPINE BESYLATE 5 MILLIGRAM(S): 10 TABLET ORAL at 06:03

## 2025-04-15 RX ADMIN — Medication 250 MILLIGRAM(S): at 12:18

## 2025-04-15 RX ADMIN — Medication 1 DROP(S): at 06:02

## 2025-04-15 RX ADMIN — Medication 1000 UNIT(S): at 12:19

## 2025-04-15 RX ADMIN — DONEPEZIL HYDROCHLORIDE 10 MILLIGRAM(S): 5 TABLET ORAL at 12:19

## 2025-04-15 RX ADMIN — Medication 250 MILLIGRAM(S): at 08:40

## 2025-04-15 NOTE — DISCHARGE NOTE PROVIDER - NSDCMRMEDTOKEN_GEN_ALL_CORE_FT
acetaminophen 325 mg oral tablet: 2 tab(s) orally every 6 hours as needed for Temp greater or equal to 38C (100.4F), Mild Pain (1 - 3)  albuterol 90 mcg/inh inhalation aerosol: 2 puff(s) inhaled every 6 hours as needed for Shortness of Breath and/or Wheezing  amLODIPine 5 mg oral tablet: 1 tab(s) orally once a day  bisacodyl 5 mg oral delayed release tablet: 1 tab(s) orally every 12 hours As needed Constipation  cholecalciferol 25 mcg (1000 intl units) oral tablet: 2 tab(s) orally once a day  divalproex sodium 250 mg oral delayed release tablet: 1 tab(s) orally 3 times a day (9am, 1pm &amp; 5pm)  donepezil 10 mg oral tablet: 1 tab(s) orally once a day  melatonin 3 mg oral tablet: 2 tab(s) orally once a day (at bedtime)  polyethylene glycol 3350 oral powder for reconstitution: 17 gram(s) orally once a day As needed Constipation  QUEtiapine 100 mg oral tablet: 1 tab(s) orally once a day (at bedtime)  Refresh Digital PF ophthalmic solution: 1 drop(s) in each eye 2 times a day

## 2025-04-15 NOTE — DISCHARGE NOTE NURSING/CASE MANAGEMENT/SOCIAL WORK - NSDCPEFALRISK_GEN_ALL_CORE
For information on Fall & Injury Prevention, visit: [email or web address] OR  [email or web address] OR  https://www.cdc.gov/steadi/patient.html

## 2025-04-15 NOTE — PROGRESS NOTE ADULT - PROBLEM SELECTOR PLAN 5
- Diet: DASH (easy to chew, although as precaution as on regular diet at NH)  - DVT PPx: Lovenox   - Code status: Full code (GOC on 4/11)  - Dispo: Possible d/c in the AM pending cards clearance, Mental status back to baseline per health aide at bedside
- Diet: DASH (easy to chew, although as precaution as on regular diet at NH)  - DVT PPx: Lovenox   - Code status: Full code ([facility] on 4/11)    Son updated 4/14/25
- Diet: DASH (easy to chew, although as precaution as on regular diet at NH)  - DVT PPx: Lovenox   - Code status: Full code (GOC on 4/11)    Son updated 4/14/25    DC pending, plan for return to facility
- Diet: DASH (easy to chew, although as precaution as on regular diet at NH)  - DVT PPx: Lovenox   - Code status: Full code (GOC on 4/11)  - Dispo: Pending clinical course    Plan of care reviewed with pt's son and aide at bedside. Expressed understanding and agreement. All questions answered.
negative

## 2025-04-15 NOTE — DISCHARGE NOTE PROVIDER - HOSPITAL COURSE
83 y/o M with PMH of dementia (AAOx2 at baseline), Hx of HTN, Hx of cataracts s/p cataract surgery on 1/20/24, Xerotic dermatitis, hx of agitation and psychosis on prior admissions who presents via ambulance from [facility] to [facility] for further eval and management for agitation. Also noted to be bradycardia to 30s-40s. EP and psych consulted.     Reports of increasing agitation since 6 PM on 4/10, pt verbally and physically abusive at times with aide. s/p haldol 2.5mg x2 in the ED. CXR negative, UA negative, covid/flu/rsv negative (expand to full rvp). Pt seen by Psychiatry eval, now seems improved, calm, no further paranoia, Psychiatry team suspecting possible delirium/sundowning and recommend c/w home regimen: depakote 250mg TID, seroquel 100mg qhs, and aricept 10mg daily     Pt was seen by EP for sinus bradycardia to 30s-40s. 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 TSH wnl, TTE reviewed, EF intact, no significant valvular disease, per EP intervention or pacing indication advised at this time    PT eval recommending outpatient PT, CM to set up home care  DC pending, plan for return to facility.  Discussed case with Dr. Guerrero, pt is cleared for discharge home.    83 y/o M with PMH of dementia (AAOx2 at baseline), Hx of HTN, Hx of cataracts s/p cataract surgery on 1/20/24, Xerotic dermatitis, hx of agitation and psychosis on prior admissions who presents via ambulance from [facility] to [facility] for further eval and management for agitation. Also noted to be bradycardia to 30s-40s. EP and psych consulted.     Reports of increasing agitation since 6 PM on 4/10, pt verbally and physically abusive at times with aide. s/p haldol 2.5mg x2 in the ED. CXR negative, UA negative, covid/flu/rsv negative (expand to full rvp). Pt seen by Psychiatry eval, now seems improved, calm, no further paranoia, Psychiatry team suspecting possible delirium/sundowning and recommend c/w home regimen: depakote 250mg TID, seroquel 100mg qhs, and aricept 10mg daily. No PRNs required within 24 hrs of discharge.     Pt was seen by EP for sinus bradycardia to 30s-40s. 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 TSH wnl, TTE reviewed, EF intact, no significant valvular disease, per EP intervention or pacing indication advised at this time    PT eval recommending outpatient PT, CM to set up home care  Outpatient Psychiatry follow up.   DC pending, plan for return to facility.  Discussed case with Dr. Guerrero, pt is cleared for discharge home.     Discussed with Psychiatry attending throughout admission and day of discharge.   Son updated during hospital stay, on board with plan of care.     DC planning time 35 minutes.

## 2025-04-15 NOTE — BH CONSULTATION LIAISON PROGRESS NOTE - NSBHCONSULTFOLLOWAFTERCARE_PSY_A_CORE FT
follow up with the psychiatrist at the facility follow up with Dr. Mahan at [facility] [phone]    Additional referrals:   [facility] Outpatient services  For acute crisis: [facility]  [phone] [place] [address], [address] Floor, [place] [address]  [phone]  [email or web address]    [facility] [phone]

## 2025-04-15 NOTE — PROGRESS NOTE ADULT - SUBJECTIVE AND OBJECTIVE BOX
Subjective:Patient states that he feels fine and is not sure why he is in the hosptial. He denies HA, lightheadedness, dizziness, CP, palpitation, SOB, abdominal pain, and N/V.      Interval Events:  - Presented from assisted living with agitation/AMS.  - EP consulted for bradycardia. EKG and tele show second degree type 1 block (Wenckebach), rates mostly 50s-60s, occasionally high 40s. He states he has some chronic dizziness, but that's at a rate of 60 and BP 140s/60s, so less likely rhythm-related. Chronically feels fatigued. Telemetry today showed PAT around 3am and 4am. Patient was asymptomatic.  - PSYCH consulted for agitation and recommended Seroquel and Zyprexa   - TTE showed LV function was EF 75% and no significant valvular disease.      MEDICATIONS  (STANDING):  amLODIPine   Tablet 5 milliGRAM(s) Oral daily  artificial  tears Solution 1 Drop(s) Both EYES two times a day  cholecalciferol 1000 Unit(s) Oral daily  divalproex  milliGRAM(s) Oral <User Schedule>  donepezil 10 milliGRAM(s) Oral daily  enoxaparin Injectable 40 milliGRAM(s) SubCutaneous every 24 hours  famotidine    Tablet 40 milliGRAM(s) Oral two times a day  melatonin 6 milliGRAM(s) Oral at bedtime  QUEtiapine 100 milliGRAM(s) Oral at bedtime    MEDICATIONS  (PRN):  acetaminophen     Tablet .. 650 milliGRAM(s) Oral every 6 hours PRN Temp greater or equal to 38C (100.4F), Mild Pain (1 - 3)  albuterol    90 MICROgram(s) HFA Inhaler 2 Puff(s) Inhalation every 6 hours PRN Shortness of Breath and/or Wheezing  bisacodyl 5 milliGRAM(s) Oral every 12 hours PRN Constipation  OLANZapine 1.25 milliGRAM(s) Oral every 6 hours PRN agitation  polyethylene glycol 3350 17 Gram(s) Oral daily PRN Constipation      Vital Signs Last 24 Hrs  T(C): 36.7 (14 Apr 2025 05:15), Max: 37.2 (13 Apr 2025 17:13)  T(F): 98.1 (14 Apr 2025 05:15), Max: 98.9 (13 Apr 2025 17:13)  HR: 81 (14 Apr 2025 05:15) (64 - 81)  BP: 158/90 (14 Apr 2025 05:15) (138/79 - 158/90)  BP(mean): --  RR: 18 (14 Apr 2025 05:15) (17 - 20)  SpO2: 100% (14 Apr 2025 05:15) (100% - 100%)    Parameters below as of 14 Apr 2025 05:15  Patient On (Oxygen Delivery Method): nasal cannula    O2 Flow (L/min): 2    I&O's Detail    13 Apr 2025 07:01  -  14 Apr 2025 07:00  --------------------------------------------------------  IN:    Oral Fluid: 620 mL  Total IN: 620 mL    OUT:    Voided (mL): 975 mL  Total OUT: 975 mL    Total NET: -355 mL      Physical Exam:  GENERAL: Lying in bed, well appearing, speaking in full sentence, in NAD  HEART: S1S2 RRR  PULMONARY: CTABL, normal respiratory effort  ABDOMEN: + Bowel sounds present, soft, NDNT  EXTREMITIES:  Warm, well -perfused, no pedal edema, distal pulses present  NEUROLOGICAL:AOx1(person)    TELEMETERIC: Wenckebach with 2 short episodes of PAT at around 3am, and 4am                            14.3   8.43  )-----------( 206      ( 14 Apr 2025 03:53 )             42.6       04-14    139  |  107  |  13  ----------------------------<  77  3.9   |  21[L]  |  1.09    Ca    8.7      14 Apr 2025 03:53  Phos  3.1     04-14  Mg     2.00     04-14      CARDIAC MARKERS ( 13 Apr 2025 21:37 )  x     / x     / x     / x     / 4.0 ng/mL      < from: TTE W or WO Ultrasound Enhancing Agent (04.12.25 @ 14:06) >      1. Technically difficult image quality.   2. Left ventricular cavity is small. Left ventricular systolic function is normal with an ejection fraction of 75 % by Barbosa's method of disks.   3. Normal right ventricular cavity size and normal right ventricular systolic function. Tricuspid annular plane systolic excursion (TAPSE) is 2.0 cm (normal >=1.7 cm).   4. No significant valvular disease.   5. Left atrium is normal in size.   6. No pericardial effusion seen.    < end of copied text >            
PROGRESS NOTE:     CONTACT INFO:  Vianey Kraft DO  available on teams    Patient is a 84y old  Male who presents with a chief complaint of Agitation, Behavioral disturbance (12 Apr 2025 11:56)      SUBJECTIVE / OVERNIGHT EVENTS:  Overnight patient with brief ep of NSVT, HR briefly down to 36, Ron    ADDITIONAL REVIEW OF SYSTEMS:    MEDICATIONS  (STANDING):  amLODIPine   Tablet 5 milliGRAM(s) Oral daily  artificial  tears Solution 1 Drop(s) Both EYES two times a day  cholecalciferol 1000 Unit(s) Oral daily  divalproex  milliGRAM(s) Oral <User Schedule>  donepezil 10 milliGRAM(s) Oral daily  enoxaparin Injectable 40 milliGRAM(s) SubCutaneous every 24 hours  melatonin 6 milliGRAM(s) Oral at bedtime  QUEtiapine 100 milliGRAM(s) Oral at bedtime    MEDICATIONS  (PRN):  acetaminophen     Tablet .. 650 milliGRAM(s) Oral every 6 hours PRN Temp greater or equal to 38C (100.4F), Mild Pain (1 - 3)  albuterol    90 MICROgram(s) HFA Inhaler 2 Puff(s) Inhalation every 6 hours PRN Shortness of Breath and/or Wheezing  OLANZapine 1.25 milliGRAM(s) Oral every 6 hours PRN agitation      CAPILLARY BLOOD GLUCOSE        I&O's Summary    12 Apr 2025 07:01  -  13 Apr 2025 07:00  --------------------------------------------------------  IN: 1350 mL / OUT: 2325 mL / NET: -975 mL    13 Apr 2025 07:01  -  13 Apr 2025 11:46  --------------------------------------------------------  IN: 200 mL / OUT: 0 mL / NET: 200 mL        PHYSICAL EXAM:  Vital Signs Last 24 Hrs  T(C): 36.9 (13 Apr 2025 09:13), Max: 37.1 (12 Apr 2025 23:50)  T(F): 98.4 (13 Apr 2025 09:13), Max: 98.7 (12 Apr 2025 23:50)  HR: 77 (13 Apr 2025 09:13) (36 - 77)  BP: 108/74 (13 Apr 2025 09:13) (108/74 - 140/80)  BP(mean): --  RR: 17 (13 Apr 2025 09:13) (16 - 18)  SpO2: 100% (13 Apr 2025 09:13) (96% - 100%)    Parameters below as of 13 Apr 2025 09:13  Patient On (Oxygen Delivery Method): room air        CONSTITUTIONAL: NAD, well-developed  RESPIRATORY: Normal respiratory effort; lungs are clear to auscultation bilaterally  CARDIOVASCULAR: Regular rate and rhythm, normal S1 and S2, no murmur/rub/gallop; No lower extremity edema; Peripheral pulses are 2+ bilaterally  ABDOMEN: Nontender to palpation, normoactive bowel sounds, no rebound/guarding; No hepatosplenomegaly  MUSCLOSKELETAL: no clubbing or cyanosis of digits; no joint swelling or tenderness to palpation  PSYCH: A+O to person, place, and time; affect appropriate    LABS:                        14.2   6.54  )-----------( 193      ( 13 Apr 2025 06:22 )             43.4     04-13    140  |  107  |  16  ----------------------------<  65[L]  4.1   |  23  |  1.12    Ca    8.6      13 Apr 2025 06:22  Phos  3.4     04-13  Mg     2.10     04-13    TPro  6.0  /  Alb  3.2[L]  /  TBili  0.6  /  DBili  x   /  AST  22  /  ALT  14  /  AlkPhos  69  04-12          Urinalysis Basic - ( 13 Apr 2025 06:22 )    Color: x / Appearance: x / SG: x / pH: x  Gluc: 65 mg/dL / Ketone: x  / Bili: x / Urobili: x   Blood: x / Protein: x / Nitrite: x   Leuk Esterase: x / RBC: x / WBC x   Sq Epi: x / Non Sq Epi: x / Bacteria: x        Urinalysis with Rflx Culture (collected 11 Apr 2025 15:37)        RADIOLOGY & ADDITIONAL TESTS:  Results Reviewed:   Imaging Personally Reviewed:  Electrocardiogram Personally Reviewed:    COORDINATION OF CARE:  Care Discussed with Consultants/Other Providers [Y/N]:  Prior or Outpatient Records Reviewed [Y/N]:  
PROGRESS NOTE:     CONTACT INFO:  Vianey Kraft DO  available on teams    Patient is a 84y old  Male who presents with a chief complaint of Agitation, Behavioral disturbance (11 Apr 2025 18:16)      SUBJECTIVE / OVERNIGHT EVENTS:  No acute events overnight. Patient seen and evaluated at bedside. Denies any complaints. HR stable in 60's. Patient AAOX2, at baseline.     ADDITIONAL REVIEW OF SYSTEMS:    MEDICATIONS  (STANDING):  amLODIPine   Tablet 5 milliGRAM(s) Oral daily  artificial  tears Solution 1 Drop(s) Both EYES two times a day  cholecalciferol 1000 Unit(s) Oral daily  divalproex  milliGRAM(s) Oral <User Schedule>  donepezil 10 milliGRAM(s) Oral daily  enoxaparin Injectable 40 milliGRAM(s) SubCutaneous every 24 hours  melatonin 6 milliGRAM(s) Oral at bedtime  QUEtiapine 100 milliGRAM(s) Oral at bedtime    MEDICATIONS  (PRN):  acetaminophen     Tablet .. 650 milliGRAM(s) Oral every 6 hours PRN Temp greater or equal to 38C (100.4F), Mild Pain (1 - 3)  albuterol    90 MICROgram(s) HFA Inhaler 2 Puff(s) Inhalation every 6 hours PRN Shortness of Breath and/or Wheezing  OLANZapine 1.25 milliGRAM(s) Oral every 6 hours PRN agitation      CAPILLARY BLOOD GLUCOSE        I&O's Summary      PHYSICAL EXAM:  Vital Signs Last 24 Hrs  T(C): 36.4 (12 Apr 2025 05:40), Max: 37.1 (11 Apr 2025 23:30)  T(F): 97.5 (12 Apr 2025 05:40), Max: 98.7 (11 Apr 2025 23:30)  HR: 69 (12 Apr 2025 05:40) (51 - 69)  BP: 111/74 (12 Apr 2025 05:40) (111/74 - 168/95)  BP(mean): --  RR: 17 (12 Apr 2025 05:40) (12 - 18)  SpO2: 97% (12 Apr 2025 05:40) (97% - 98%)    Parameters below as of 12 Apr 2025 05:40  Patient On (Oxygen Delivery Method): room air        CONSTITUTIONAL: NAD, well-developed  RESPIRATORY: Normal respiratory effort; lungs are clear to auscultation bilaterally  CARDIOVASCULAR: Regular rate and rhythm, normal S1 and S2  ABDOMEN: Nontender to palpation, soft  MUSCLOSKELETAL: no KENDRA  PSYCH: A+O x2 (does not know date), baseline    LABS:                        13.4   6.56  )-----------( 180      ( 12 Apr 2025 04:50 )             40.5     04-12    140  |  108[H]  |  15  ----------------------------<  79  4.0   |  23  |  1.13    Ca    8.6      12 Apr 2025 04:50  Phos  3.0     04-11  Mg     2.30     04-11    TPro  6.0  /  Alb  3.2[L]  /  TBili  0.6  /  DBili  x   /  AST  22  /  ALT  14  /  AlkPhos  69  04-12          Urinalysis Basic - ( 12 Apr 2025 04:50 )    Color: x / Appearance: x / SG: x / pH: x  Gluc: 79 mg/dL / Ketone: x  / Bili: x / Urobili: x   Blood: x / Protein: x / Nitrite: x   Leuk Esterase: x / RBC: x / WBC x   Sq Epi: x / Non Sq Epi: x / Bacteria: x        Urinalysis with Rflx Culture (collected 11 Apr 2025 15:37)        RADIOLOGY & ADDITIONAL TESTS:  Results Reviewed:   Imaging Personally Reviewed:  Electrocardiogram Personally Reviewed:    COORDINATION OF CARE:  Care Discussed with Consultants/Other Providers [Y/N]:  Prior or Outpatient Records Reviewed [Y/N]:  
Patient is a 84y old  Male who presents with a chief complaint of Agitation, Behavioral disturbance (14 Apr 2025 14:49)    SUBJECTIVE / OVERNIGHT EVENTS: No acute events. Did not require any PRNs past 24 hrs. Calm. Aide at bedside.     MEDICATIONS  (STANDING):  amLODIPine   Tablet 5 milliGRAM(s) Oral daily  artificial  tears Solution 1 Drop(s) Both EYES two times a day  cholecalciferol 1000 Unit(s) Oral daily  divalproex  milliGRAM(s) Oral <User Schedule>  donepezil 10 milliGRAM(s) Oral daily  enoxaparin Injectable 40 milliGRAM(s) SubCutaneous every 24 hours  famotidine    Tablet 40 milliGRAM(s) Oral two times a day  melatonin 6 milliGRAM(s) Oral at bedtime  QUEtiapine 100 milliGRAM(s) Oral at bedtime    MEDICATIONS  (PRN):  acetaminophen     Tablet .. 650 milliGRAM(s) Oral every 6 hours PRN Temp greater or equal to 38C (100.4F), Mild Pain (1 - 3)  albuterol    90 MICROgram(s) HFA Inhaler 2 Puff(s) Inhalation every 6 hours PRN Shortness of Breath and/or Wheezing  bisacodyl 5 milliGRAM(s) Oral every 12 hours PRN Constipation  OLANZapine 1.25 milliGRAM(s) Oral every 6 hours PRN agitation  polyethylene glycol 3350 17 Gram(s) Oral daily PRN Constipation      CAPILLARY BLOOD GLUCOSE        I&O's Summary    14 Apr 2025 07:01  -  15 Apr 2025 07:00  --------------------------------------------------------  IN: 200 mL / OUT: 1221 mL / NET: -1021 mL        PHYSICAL EXAM:  Vital Signs Last 24 Hrs  T(C): 36.7 (15 Apr 2025 08:55), Max: 36.7 (15 Apr 2025 08:55)  T(F): 98 (15 Apr 2025 08:55), Max: 98 (15 Apr 2025 08:55)  HR: 68 (15 Apr 2025 08:55) (62 - 79)  BP: 127/74 (15 Apr 2025 08:55) (112/87 - 146/87)  BP(mean): --  RR: 18 (15 Apr 2025 08:55) (17 - 18)  SpO2: 100% (15 Apr 2025 08:55) (97% - 100%)    Parameters below as of 15 Apr 2025 08:55  Patient On (Oxygen Delivery Method): room air    CONSTITUTIONAL: NAD, laying in bed  ENMT: Moist oral mucosa  RESPIRATORY: Normal respiratory effort; lungs are clear to auscultation bilaterally  CARDIOVASCULAR: Regular rate and rhythm, normal S1 and S2, No lower extremity edema  ABDOMEN: Nontender to palpation, normoactive bowel sounds, no rebound/guarding  PSYCH: calm    LABS:                        14.1   7.02  )-----------( 215      ( 15 Apr 2025 04:50 )             42.3     04-15    140  |  105  |  15  ----------------------------<  83  4.2   |  23  |  1.14    Ca    8.6      15 Apr 2025 04:50  Phos  3.3     04-15  Mg     2.20     04-15    CARDIAC MARKERS ( 13 Apr 2025 21:37 )  x     / x     / x     / x     / 4.0 ng/mL    Urinalysis Basic - ( 15 Apr 2025 04:50 )    Color: x / Appearance: x / SG: x / pH: x  Gluc: 83 mg/dL / Ketone: x  / Bili: x / Urobili: x   Blood: x / Protein: x / Nitrite: x   Leuk Esterase: x / RBC: x / WBC x   Sq Epi: x / Non Sq Epi: x / Bacteria: x    RADIOLOGY & ADDITIONAL TESTS: Reviewed    COORDINATION OF CARE:  Care Discussed with Consultants/Other Providers [Y- Medicine ACP, CM, SW, RN]
Patient is a 84y old  Male who presents with a chief complaint of Agitation, Behavioral disturbance (14 Apr 2025 12:21)    SUBJECTIVE / OVERNIGHT EVENTS: Agitation overnight requiring 2 PRNs. This morning, aide at bedside, patient calm, answering simple questions, denies discomfort.     MEDICATIONS  (STANDING):  amLODIPine   Tablet 5 milliGRAM(s) Oral daily  artificial  tears Solution 1 Drop(s) Both EYES two times a day  cholecalciferol 1000 Unit(s) Oral daily  divalproex  milliGRAM(s) Oral <User Schedule>  donepezil 10 milliGRAM(s) Oral daily  enoxaparin Injectable 40 milliGRAM(s) SubCutaneous every 24 hours  famotidine    Tablet 40 milliGRAM(s) Oral two times a day  melatonin 6 milliGRAM(s) Oral at bedtime  QUEtiapine 100 milliGRAM(s) Oral at bedtime    MEDICATIONS  (PRN):  acetaminophen     Tablet .. 650 milliGRAM(s) Oral every 6 hours PRN Temp greater or equal to 38C (100.4F), Mild Pain (1 - 3)  albuterol    90 MICROgram(s) HFA Inhaler 2 Puff(s) Inhalation every 6 hours PRN Shortness of Breath and/or Wheezing  bisacodyl 5 milliGRAM(s) Oral every 12 hours PRN Constipation  OLANZapine 1.25 milliGRAM(s) Oral every 6 hours PRN agitation  polyethylene glycol 3350 17 Gram(s) Oral daily PRN Constipation      CAPILLARY BLOOD GLUCOSE        I&O's Summary    13 Apr 2025 07:01  -  14 Apr 2025 07:00  --------------------------------------------------------  IN: 620 mL / OUT: 975 mL / NET: -355 mL    14 Apr 2025 07:01  -  14 Apr 2025 14:49  --------------------------------------------------------  IN: 0 mL / OUT: 621 mL / NET: -621 mL        PHYSICAL EXAM:  Vital Signs Last 24 Hrs  T(C): 36.3 (14 Apr 2025 13:42), Max: 37.2 (13 Apr 2025 17:13)  T(F): 97.4 (14 Apr 2025 13:42), Max: 98.9 (13 Apr 2025 17:13)  HR: 79 (14 Apr 2025 13:42) (64 - 81)  BP: 112/87 (14 Apr 2025 13:42) (112/87 - 158/90)  BP(mean): --  RR: 18 (14 Apr 2025 13:42) (17 - 20)  SpO2: 99% (14 Apr 2025 13:42) (99% - 100%)    Parameters below as of 14 Apr 2025 13:42  Patient On (Oxygen Delivery Method): room air    CONSTITUTIONAL: NAD, laying in bed  EYES: conjunctiva and sclera clear  ENMT: Moist oral mucosa  RESPIRATORY: Normal respiratory effort; lungs are clear to auscultation bilaterally  CARDIOVASCULAR: Regular rate and rhythm, normal S1 and S2, No lower extremity edema  ABDOMEN: Nontender to palpation, normoactive bowel sounds, no rebound/guarding  PSYCH: calm    LABS:                        14.3   8.43  )-----------( 206      ( 14 Apr 2025 03:53 )             42.6     04-14    139  |  107  |  13  ----------------------------<  77  3.9   |  21[L]  |  1.09    Ca    8.7      14 Apr 2025 03:53  Phos  3.1     04-14  Mg     2.00     04-14        CARDIAC MARKERS ( 13 Apr 2025 21:37 )  x     / x     / x     / x     / 4.0 ng/mL      Urinalysis Basic - ( 14 Apr 2025 03:53 )    Color: x / Appearance: x / SG: x / pH: x  Gluc: 77 mg/dL / Ketone: x  / Bili: x / Urobili: x   Blood: x / Protein: x / Nitrite: x   Leuk Esterase: x / RBC: x / WBC x   Sq Epi: x / Non Sq Epi: x / Bacteria: x        Urinalysis with Rflx Culture (collected 11 Apr 2025 15:37)    RADIOLOGY & ADDITIONAL TESTS: Reviewed  COORDINATION OF CARE:  Care Discussed with Consultants/Other Providers [Y- Medicine ACP, Psychiatry attending, ERA, SW, RN]

## 2025-04-15 NOTE — PROGRESS NOTE ADULT - PROBLEM SELECTOR PLAN 1
- reports of increasing agitation since 6 PM on 4/10, pt verbally and physically abusive at times with aide. s/p haldol 2.5mg x2 in the ED  - CXR negative, UA negative, covid/flu/rsv negative (expand to full rvp).   - PT eval   - safety watch for now. Currently AAOX2  - c/w home regimen: depakote 250mg TID, seroquel 100mg qhs, and aricept 10mg daily for now  - Discussed with son, stating in addition to agitation, has some increased suspiciousness and paranoia toward aide, who he previously trusted  - Psych consult appreciated, d/w attending today  - b12, folate, WNL
- reports of increasing agitation since 6 PM on 4/10, pt verbally and physically abusive at times with aide. s/p haldol 2.5mg x2 in the ED  - CXR negative, UA negative, covid/flu/rsv negative (expand to full rvp).   - PT eval   - safety watch for now. Currently AAOX2, back to baseline now per aide at bedside this AM  - c/w home regimen: depakote 250mg TID, seroquel 100mg qhs, and aricept 10mg daily for now  - psych consulted; recs appreciated  - b12, folate, WNL
- reports of increasing agitation since 6 PM on 4/10, pt verbally and physically abusive at times with aide. s/p haldol 2.5mg x2 in the ED  - CXR negative, UA negative, covid/flu/rsv negative (expand to full rvp).   - PT eval   - safety watch for now. Currently AAOX2, back to baseline now per aide at bedside this AM  - c/w home regimen: depakote 250mg TID, seroquel 100mg qhs, and aricept 10mg daily for now  - psych consulted; recs appreciated  - b12, folate, WNL
Reports of increasing agitation since 6 PM on 4/10, pt verbally and physically abusive at times with aide. s/p haldol 2.5mg x2 in the ED. CXR negative, UA negative, covid/flu/rsv negative (expand to full rvp).   - Appreciate Psychiatry eval, patient now seems improved, calm, no further paranoia, Psychiatry team suspecting possible delirium/sundowning.   - C/w home regimen: depakote 250mg TID, seroquel 100mg qhs, and aricept 10mg daily   - PT eval recommending outpatient PT  - b12, folate, WNL  - outpatient Psychiatry follow up

## 2025-04-15 NOTE — BH CONSULTATION LIAISON PROGRESS NOTE - CURRENT MEDICATION
MEDICATIONS  (STANDING):  amLODIPine   Tablet 5 milliGRAM(s) Oral daily  artificial  tears Solution 1 Drop(s) Both EYES two times a day  cholecalciferol 1000 Unit(s) Oral daily  divalproex  milliGRAM(s) Oral <User Schedule>  donepezil 10 milliGRAM(s) Oral daily  enoxaparin Injectable 40 milliGRAM(s) SubCutaneous every 24 hours  famotidine    Tablet 40 milliGRAM(s) Oral two times a day  melatonin 6 milliGRAM(s) Oral at bedtime  QUEtiapine 100 milliGRAM(s) Oral at bedtime    MEDICATIONS  (PRN):  acetaminophen     Tablet .. 650 milliGRAM(s) Oral every 6 hours PRN Temp greater or equal to 38C (100.4F), Mild Pain (1 - 3)  albuterol    90 MICROgram(s) HFA Inhaler 2 Puff(s) Inhalation every 6 hours PRN Shortness of Breath and/or Wheezing  bisacodyl 5 milliGRAM(s) Oral every 12 hours PRN Constipation  OLANZapine 1.25 milliGRAM(s) Oral every 6 hours PRN agitation  polyethylene glycol 3350 17 Gram(s) Oral daily PRN Constipation  
MEDICATIONS  (STANDING):  amLODIPine   Tablet 5 milliGRAM(s) Oral daily  artificial  tears Solution 1 Drop(s) Both EYES two times a day  cholecalciferol 1000 Unit(s) Oral daily  divalproex  milliGRAM(s) Oral <User Schedule>  donepezil 10 milliGRAM(s) Oral daily  enoxaparin Injectable 40 milliGRAM(s) SubCutaneous every 24 hours  famotidine    Tablet 40 milliGRAM(s) Oral two times a day  melatonin 6 milliGRAM(s) Oral at bedtime  QUEtiapine 100 milliGRAM(s) Oral at bedtime    MEDICATIONS  (PRN):  acetaminophen     Tablet .. 650 milliGRAM(s) Oral every 6 hours PRN Temp greater or equal to 38C (100.4F), Mild Pain (1 - 3)  albuterol    90 MICROgram(s) HFA Inhaler 2 Puff(s) Inhalation every 6 hours PRN Shortness of Breath and/or Wheezing  bisacodyl 5 milliGRAM(s) Oral every 12 hours PRN Constipation  OLANZapine 1.25 milliGRAM(s) Oral every 6 hours PRN agitation  polyethylene glycol 3350 17 Gram(s) Oral daily PRN Constipation

## 2025-04-15 NOTE — DISCHARGE NOTE NURSING/CASE MANAGEMENT/SOCIAL WORK - PATIENT PORTAL LINK FT
You can access the FollowMyHealth Patient Portal offered by [facility] by registering at the following website: [email or web address]. By joining [employer]’s FollowMyHealth portal, you will also be able to view your health information using other applications (apps) compatible with our system.

## 2025-04-15 NOTE — PROGRESS NOTE ADULT - PROBLEM SELECTOR PLAN 2
- Noted with sinus bradycardia to 30s-40s. Tele with ?2nd degree HB type 1 vs type 2. Hstrop wnl. EKG reviewed.  - EP consulted; recs appreciated  - check baseline TTE  - avoid AVNB agents (including BB eye drops)  - tele monitoring  - TSh WNL
- Noted with sinus bradycardia to 30s-40s. 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  - EP consulted, input appreciated, TTE reviewed, EF intact, no significant valvular disease  - No EP intervention advised at this time  - TSH wnl
- Noted with sinus bradycardia to 30s-40s. 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  - EP consulted, input appreciated, TTE reviewed, EF intact, no significant valvular disease  - No EP intervention advised at this time  - TSH wnl
- Noted with sinus bradycardia to 30s-40s. Tele with ?2nd degree HB type 1 vs type 2. Hstrop wnl. EKG reviewed. Asymptomatic   - EP consulted; recs appreciated. Per their assessment, no need for PPM at this time, patient asymptomatic   -TTE reviewed  - avoid AVNB agents (including BB eye drops)  - tele monitoring  - TSh WNL  -Also had brief ep of NSVT overnight. lytes stable this AM. will f/u with cards fur further recs

## 2025-04-15 NOTE — BH CONSULTATION LIAISON PROGRESS NOTE - NSBHFUPINTERVALHXFT_PSY_A_CORE
Patient seen and evaluated, staff consulted, chart, labs, meds reviewed. No acute events over this interval. Pt sedated on exam, more responsive to aide at bedside. Briefly opens eyes and is AO to self only. Continues to be compliant with all meds. Aide providing collateral information pt with two days of confusion last week on Thursday and Friday, with two episodes where he appeared afraid of her and told her "you're going to kill me." Pt now denies SI, HI, AVH.

## 2025-04-15 NOTE — DISCHARGE NOTE PROVIDER - NSDCFUADDAPPT_GEN_ALL_CORE_FT
Follow up with your primary care physician for further monitoring in 1-2 weeks. Please call to arrange appointment.     Follow up with Psychiatry - please call to make an appointment with Dr. Kalli Mahan at [facility] [phone]    Additional referrals:   [facility] Outpatient services  For acute crisis: [facility]  [phone] [place] [address], [address] Floor, [place] [address]  [phone]  [email or web address]    [facility] - [phone]

## 2025-04-15 NOTE — PROVIDER CONTACT NOTE (OTHER) - SITUATION
HR simon 36, unsustained. current HR 60s on tele monitor.
patient c/o difficulty breathing and chest pain 5/10
6 beats v tach on tele monitor. Patient asymptomatic, no signs of distress.

## 2025-04-15 NOTE — DISCHARGE NOTE PROVIDER - NSDCCPCAREPLAN_GEN_ALL_CORE_FT
PRINCIPAL DISCHARGE DIAGNOSIS  Diagnosis: Agitation  Assessment and Plan of Treatment: You were seen by psychology. Confusion may have been due to delerium vs sundowning. Behavior has improved. No changed were made to your medication regimen however you will need close follow up with [facility] Dr. Mahan.      SECONDARY DISCHARGE DIAGNOSES  Diagnosis: Sinus bradycardia  Assessment and Plan of Treatment: You were seen by electrophysiology who determine you do not need a pacemaker.    Diagnosis: History of dementia  Assessment and Plan of Treatment: Continue depakote, seroquel, and aricept as prescibed.    Diagnosis: HTN (hypertension)  Assessment and Plan of Treatment: COntinue amlodipine, monitor Blood pressure.     PRINCIPAL DISCHARGE DIAGNOSIS  Diagnosis: Agitation  Assessment and Plan of Treatment: You were seen by psychology. Confusion may have been due to delirium vs sundowning. Behavior has improved. No changed were made to your medication regimen since it has been working well for you for quite some time, however you will need close follow up with Geriatric Psychiatry Dr. Mahan who you have been seeing.      SECONDARY DISCHARGE DIAGNOSES  Diagnosis: History of dementia  Assessment and Plan of Treatment: Continue depakote, seroquel, and aricept as prescibed.    Diagnosis: HTN (hypertension)  Assessment and Plan of Treatment: COntinue amlodipine, monitor Blood pressure.    Diagnosis: Sinus bradycardia  Assessment and Plan of Treatment: You were found to have a slow heart rate at times but you were seen by Cardiology (electrophysiology) who determined you do not need a pacemaker or any additional workup at this time.

## 2025-04-15 NOTE — BH CONSULTATION LIAISON PROGRESS NOTE - NSBHATTESTCOMMENTATTENDFT_PSY_A_CORE
Chart reviewed, seen/evaluated with trainee, agree with above assessment/plan. Patient AAOX1, confused/inattentive, somewhat lethargic, overall polite.coopertaive, denies avh, denies si and hi, Care coordinated with aide at bedside, pt. son over the phone, and medical attending. Plan as above, Will follow if remains at [facility] .  Chart reviewed, seen/evaluated with trainee, agree with above assessment/plan. Patient AAOX1, confused/inattentive, somewhat lethargic, overall polite/cooperative, denies avh, denies si and hi, Care coordinated with aide at bedside, pt.'s son over the phone, and medical attending. Plan as above, Will follow if remains at [facility] .

## 2025-04-15 NOTE — BH CONSULTATION LIAISON PROGRESS NOTE - NSBHCHARTREVIEWLAB_PSY_A_CORE FT
LABS:                        14.1   7.02  )-----------( 215      ( 15 Apr 2025 04:50 )             42.3     04-15    140  |  105  |  15  ----------------------------<  83  4.2   |  23  |  1.14    Ca    8.6      15 Apr 2025 04:50  Phos  3.3     04-15  Mg     2.20     04-15      
                      13.4   6.56  )-----------( 180      ( 12 Apr 2025 04:50 )             40.5   04-12    140  |  108[H]  |  15  ----------------------------<  79  4.0   |  23  |  1.13    Ca    8.6      12 Apr 2025 04:50  Phos  3.0     04-11  Mg     2.30     04-11    TPro  6.0  /  Alb  3.2[L]  /  TBili  0.6  /  DBili  x   /  AST  22  /  ALT  14  /  AlkPhos  69  04-12

## 2025-04-15 NOTE — CHART NOTE - NSCHARTNOTEFT_GEN_A_CORE
Spoke with patient's son, Regino Jalloh, who provided detailed collateral information. States that pt has had dementia with intermittent paranoid episodes since 2021, although he was not diagnosed with dementia until 2022. Reports that pt has been on medication regimen of seroquel 100 mg qHS and depakote 250 mg TID since April 2024 and has done well for the past year. Reports that this episode began last Thursday, when pt called the son to report that he believed his aide would kill him, and was not able to be redirected. Denies any other known episodes of paranoia for the past year. Informed son that pt is no longer endorsing these thoughts, interacts normally with his aide at bedside. Provided psychoeducation to son that episode of confusion may have been due to delirium vs sundowning, and team is hesitant to increase psychotropic regimen given sedated appearance, son expressed understanding. Son states pt follows with [facility] Outpatient nicci program w/ Dr. Mahan. Called Dr. Mahan and left .

## 2025-04-15 NOTE — PROGRESS NOTE ADULT - PROBLEM SELECTOR PLAN 4
chronic  - c/w home amlodipine 5mg daily

## 2025-04-15 NOTE — DISCHARGE NOTE NURSING/CASE MANAGEMENT/SOCIAL WORK - FINANCIAL ASSISTANCE
[facility] provides services at a reduced cost to those who are determined to be eligible through [facility]’s financial assistance program. Information regarding [facility]’s financial assistance program can be found by going to [email or web address] or by calling [phone].

## 2025-04-15 NOTE — PROGRESS NOTE ADULT - ASSESSMENT
83 y/o M with PMH of dementia (AAOx2 at baseline), Hx of HTN, Hx of cataracts s/p cataract surgery on 1/20/24, Xerotic dermatitis, hx of agitation and psychosis on prior admissions who presents via ambulance from [facility] to [facility] for further eval and management for agitation. Also noted to be bradycardia to 30s-40s. EP and psych consulted. 
84 year old man with a PMHx of dementia (A&Ox2 baseline), cataracts s/p cataract surgery (on 1/20/24), HTN, h/o agitation and psychosis presenting from assisted living with agitation/AMS.EP consulted for bradycardia.EKG and tele show second degree type 1 block (Wenckebach), rates mostly 50s-60s, occasionally high 40s. Patient has chronic dizziness, but that's at a rate of 60 and BP 140s/60s. No EP requirement needed at this time. EP will sign off.     Plan:  - Continuous telemetric monitoring  - Monitor electrolytes and replete K to 4 and Mg to 2  - TTE showed LV function was EF 75% and no significant valvular disease. Report as above  - Appreciated PSYCH recs  - No pacing requirement needed at this time  - Continue care per primary team    Duglas Sahu PA-C  Patient to be staff with attending. Please await attending addendum 
Mr. Jalloh is an 83 y/o M with PMH of dementia (AAOx2 at baseline), Hx of HTN, Hx of cataracts s/p cataract surgery on 1/20/24, Xerotic dermatitis, hx of agitation and psychosis on prior admissions who presents via ambulance from [facility] to [facility] for further eval and management for agitation, occurring since 6 PM on 4/10 and with continued episodes of on 4/11 in AM as well. Also noted to be bradycardia to 30s-40s. EP and psych consulted. Admitted for further w/u and evaluation. 
Mr. Jalloh is an 83 y/o M with PMH of dementia (AAOx2 at baseline), Hx of HTN, Hx of cataracts s/p cataract surgery on 1/20/24, Xerotic dermatitis, hx of agitation and psychosis on prior admissions who presents via ambulance from [facility] to [facility] for further eval and management for agitation, occurring since 6 PM on 4/10 and with continued episodes of on 4/11 in AM as well. Also noted to be bradycardia to 30s-40s. EP and psych consulted. Admitted for further w/u and evaluation. 
85 y/o M with PMH of dementia (AAOx2 at baseline), Hx of HTN, Hx of cataracts s/p cataract surgery on 1/20/24, Xerotic dermatitis, hx of agitation and psychosis on prior admissions who presents via ambulance from [facility] to [facility] for further eval and management for agitation. Also noted to be bradycardia to 30s-40s. EP and psych consulted.

## 2025-04-15 NOTE — PROVIDER CONTACT NOTE (OTHER) - ASSESSMENT
6 beats v tach on tele monitor. Patient asymptomatic, no signs of distress.
T 98.7. P 62. /80. R 16. O2 96%. patient asymptomatic.
T 97.4. P 68. /77. R 20. O2 100% RA.

## 2025-04-15 NOTE — PROGRESS NOTE ADULT - PROBLEM SELECTOR PLAN 3
- hx of dementia with behavioral disturbance  - home regimen: depakote 250mg TID, seroquel 100mg qhs, and aricept 10mg daily  - c/w home regimen  - psych recs appreciated
- hx of dementia with behavioral disturbance  - home regimen: depakote 250mg TID, seroquel 100mg qhs, and aricept 10mg daily  - c/w home regimen  - psych recs appreciated given ongoing behavioral issues/agitation   - metabolic w/u as above   - 1:1 / safety watch for now for safety concerns as per aide, tries to fight/get out of bed when agitated.  -started on Zyprexa 1.25 mg Q6 PRN for agitation per psych recs

## 2025-04-15 NOTE — DISCHARGE NOTE PROVIDER - CARE PROVIDER_API CALL
Marilou Mahan  Psychiatry  [address] [place] [address], [facility] [place] [address]  Phone: [phone]  Fax: [phone]  Follow Up Time:

## 2025-04-15 NOTE — BH CONSULTATION LIAISON PROGRESS NOTE - NSBHCHARTREVIEWVS_PSY_A_CORE FT
Vital Signs Last 24 Hrs  T(C): 36.7 (15 Apr 2025 08:55), Max: 36.7 (15 Apr 2025 08:55)  T(F): 98 (15 Apr 2025 08:55), Max: 98 (15 Apr 2025 08:55)  HR: 68 (15 Apr 2025 08:55) (62 - 79)  BP: 127/74 (15 Apr 2025 08:55) (112/87 - 146/87)  BP(mean): --  RR: 18 (15 Apr 2025 08:55) (17 - 18)  SpO2: 100% (15 Apr 2025 08:55) (97% - 100%)    Parameters below as of 15 Apr 2025 08:55  Patient On (Oxygen Delivery Method): room air    
Vital Signs Last 24 Hrs  T(C): 36.7 (14 Apr 2025 05:15), Max: 37.2 (13 Apr 2025 17:13)  T(F): 98.1 (14 Apr 2025 05:15), Max: 98.9 (13 Apr 2025 17:13)  HR: 81 (14 Apr 2025 05:15) (64 - 81)  BP: 158/90 (14 Apr 2025 05:15) (138/79 - 158/90)  BP(mean): --  RR: 18 (14 Apr 2025 05:15) (17 - 20)  SpO2: 100% (14 Apr 2025 05:15) (100% - 100%)    Parameters below as of 14 Apr 2025 05:15  Patient On (Oxygen Delivery Method): nasal cannula  O2 Flow (L/min): 2

## 2025-04-15 NOTE — BH CONSULTATION LIAISON PROGRESS NOTE - NSBHASSESSMENTFT_PSY_ALL_CORE
Patient is a 85 y/o male with a PmHx of Dementia (AOX2 baseline), Cataracts (s/p cataract surgery on 1/20/24), HTN, Xerotic Dermatitis. Patient living at LTCF the [facility]. Patient with Aide at bedside. Known h/o agitation/insomnia and change in behavior last admission to [facility] in Feb 2024, p/w agitation at LTC for 2 days. Spoke with aide at bedside - as per aide who is with patient daily, patient has been doing very well on current medication regime. States he has been without agitations since last admission up until Wednesday. States patient has been eating well, sleeping at night, having regular BMs, no reports of pain. NO obvious trigger. Psychiatry called for medication management of agitation.    04/12: Patient was seen and assessed at bedside. Patient is awake, alert, oriented to name and place, unable to tell provider year or date. Patient is calm, cooperative and respectful to provider, saying please and thank you often. Unable to recall events leading to admission. Patient feels safe here and feels well cared for. Has no SI or HI. reports mood is good. Sleeping well, good appetite. No rigidity felt on exam. Denies psychosis    04/14: Required 2 PRNs overnight.  Seen this AM with aide. she reports pt. appears at baseline this AM. Pt calm and cooperative, resting with eyes closed. Oriented to self only. No SI, HI, AH, VH. No rigidity on exam.   04/15: Inattentive and somewhat lethargic on interview, eyes mostly remain closed. AOx1. No PRNs required over this interval. Will reach out to son for collateral regarding concern for paranoid behavior.     Plan:   - observation to be determined by primary team, no SI or HI, has hx of agitation  - Delirium w/u and medical stabilization as you are (f/u labs ( b12 WNL, folate WNL, tsh WNL), bladder scan)  - CONTINUE Home meds  -- Depakote 250mg TID  --- VPA level corrected with albumin 3.2 is 62.4  -- Seroquel 100mg qhs   -Monitor LFTs, platelets, ammonia level and VPA level  - PRN for agitation: Zyprexa 1.25mg q6hrs  - Hold antipsychotics if qtc >500  - Dispo: no psychiatric contraindications for dc - CL will follow and monitor need for PRNs as well as behavior Patient is a 85 y/o male with a PmHx of Dementia (AOX2 baseline), Cataracts (s/p cataract surgery on 1/20/24), HTN, Xerotic Dermatitis. Patient living at LTCF the [facility]. Patient with Aide at bedside. Known h/o agitation/insomnia and change in behavior last admission to [facility] in Feb 2024, p/w agitation at LTC for 2 days. Spoke with aide at bedside - as per aide who is with patient daily, patient has been doing very well on current medication regime. States he has been without agitations since last admission up until Wednesday. States patient has been eating well, sleeping at night, having regular BMs, no reports of pain. NO obvious trigger. Psychiatry called for medication management of agitation.    04/12: Patient was seen and assessed at bedside. Patient is awake, alert, oriented to name and place, unable to tell provider year or date. Patient is calm, cooperative and respectful to provider, saying please and thank you often. Unable to recall events leading to admission. Patient feels safe here and feels well cared for. Has no SI or HI. reports mood is good. Sleeping well, good appetite. No rigidity felt on exam. Denies psychosis    04/14: Required 2 PRNs overnight.  Seen this AM with aide. she reports pt. appears at baseline this AM. Pt calm and cooperative, resting with eyes closed. Oriented to self only. No SI, HI, AH, VH. No rigidity on exam.   04/15: Inattentive and somewhat lethargic on interview, eyes mostly remain closed. AOx1. No PRNs required over this interval. collateral obtained from son   (see Chart note)    Plan:   - observation to be determined by primary team, no SI or HI, has hx of agitation  - Delirium w/u and medical stabilization as you are (f/u labs ( b12 WNL, folate WNL, tsh WNL), bladder scan)  - CONTINUE Home meds  -- Depakote 250mg TID  --- VPA level corrected with albumin 3.2 is 62.4  -- Seroquel 100mg qhs   -Monitor LFTs, platelets, ammonia level and VPA level  - PRN for agitation: Zyprexa 1.25mg q6hrs  - Hold antipsychotics if qtc >500  -Care coordinated with son today, Called Dr. Mahan and left .  - Dispo: No indication for inpatient psych. at this time, no psychiatric contraindications for dc - CL will follow and monitor need for PRNs as well as behavior

## 2025-04-15 NOTE — PROVIDER CONTACT NOTE (OTHER) - ACTION/TREATMENT ORDERED:
provider at bedside to evaluate. EKG. STAT cardiac enzyme labs. chest and abdominal x ray. famotidine IV push and miralax PO.
no new orders given at this time
ACP Tamar Bermudez made aware. Patient safety maintained.

## 2025-04-22 PROCEDURE — 99215 OFFICE O/P EST HI 40 MIN: CPT

## 2025-05-15 ENCOUNTER — INPATIENT (INPATIENT)
Facility: HOSPITAL | Age: 85
LOS: 7 days | Discharge: NURSING, REHAB OR LONG-TERM CARE | DRG: 177 | End: 2025-05-23
Attending: STUDENT IN AN ORGANIZED HEALTH CARE EDUCATION/TRAINING PROGRAM | Admitting: STUDENT IN AN ORGANIZED HEALTH CARE EDUCATION/TRAINING PROGRAM
Payer: MEDICARE

## 2025-05-15 VITALS
DIASTOLIC BLOOD PRESSURE: 71 MMHG | HEART RATE: 76 BPM | SYSTOLIC BLOOD PRESSURE: 128 MMHG | WEIGHT: 188.05 LBS | TEMPERATURE: 97 F | OXYGEN SATURATION: 88 % | RESPIRATION RATE: 24 BRPM

## 2025-05-15 DIAGNOSIS — Z29.9 ENCOUNTER FOR PROPHYLACTIC MEASURES, UNSPECIFIED: ICD-10-CM

## 2025-05-15 DIAGNOSIS — J18.9 PNEUMONIA, UNSPECIFIED ORGANISM: ICD-10-CM

## 2025-05-15 DIAGNOSIS — N17.9 ACUTE KIDNEY FAILURE, UNSPECIFIED: ICD-10-CM

## 2025-05-15 DIAGNOSIS — F03.90 UNSPECIFIED DEMENTIA, UNSPECIFIED SEVERITY, WITHOUT BEHAVIORAL DISTURBANCE, PSYCHOTIC DISTURBANCE, MOOD DISTURBANCE, AND ANXIETY: ICD-10-CM

## 2025-05-15 DIAGNOSIS — K52.9 NONINFECTIVE GASTROENTERITIS AND COLITIS, UNSPECIFIED: ICD-10-CM

## 2025-05-15 DIAGNOSIS — I10 ESSENTIAL (PRIMARY) HYPERTENSION: ICD-10-CM

## 2025-05-15 DIAGNOSIS — J96.01 ACUTE RESPIRATORY FAILURE WITH HYPOXIA: ICD-10-CM

## 2025-05-15 DIAGNOSIS — J69.0 PNEUMONITIS DUE TO INHALATION OF FOOD AND VOMIT: ICD-10-CM

## 2025-05-15 DIAGNOSIS — R79.89 OTHER SPECIFIED ABNORMAL FINDINGS OF BLOOD CHEMISTRY: ICD-10-CM

## 2025-05-15 DIAGNOSIS — Z98.49 CATARACT EXTRACTION STATUS, UNSPECIFIED EYE: Chronic | ICD-10-CM

## 2025-05-15 LAB
ALBUMIN SERPL ELPH-MCNC: 2.9 G/DL — LOW (ref 3.5–5)
ALBUMIN SERPL ELPH-MCNC: 3 G/DL — LOW (ref 3.5–5)
ALP SERPL-CCNC: 59 U/L — SIGNIFICANT CHANGE UP (ref 40–120)
ALP SERPL-CCNC: 76 U/L — SIGNIFICANT CHANGE UP (ref 40–120)
ALT FLD-CCNC: 17 U/L DA — SIGNIFICANT CHANGE UP (ref 10–60)
ALT FLD-CCNC: 17 U/L DA — SIGNIFICANT CHANGE UP (ref 10–60)
ANION GAP SERPL CALC-SCNC: 9 MMOL/L — SIGNIFICANT CHANGE UP (ref 5–17)
ANION GAP SERPL CALC-SCNC: 9 MMOL/L — SIGNIFICANT CHANGE UP (ref 5–17)
ANISOCYTOSIS BLD QL: SLIGHT — SIGNIFICANT CHANGE UP
APPEARANCE UR: CLEAR — SIGNIFICANT CHANGE UP
AST SERPL-CCNC: 16 U/L — SIGNIFICANT CHANGE UP (ref 10–40)
AST SERPL-CCNC: 21 U/L — SIGNIFICANT CHANGE UP (ref 10–40)
BACTERIA # UR AUTO: ABNORMAL /HPF
BASOPHILS # BLD AUTO: 0.02 K/UL — SIGNIFICANT CHANGE UP (ref 0–0.2)
BASOPHILS # BLD AUTO: 0.03 K/UL — SIGNIFICANT CHANGE UP (ref 0–0.2)
BASOPHILS NFR BLD AUTO: 0.2 % — SIGNIFICANT CHANGE UP (ref 0–2)
BASOPHILS NFR BLD AUTO: 0.3 % — SIGNIFICANT CHANGE UP (ref 0–2)
BILIRUB SERPL-MCNC: 0.3 MG/DL — SIGNIFICANT CHANGE UP (ref 0.2–1.2)
BILIRUB SERPL-MCNC: 0.4 MG/DL — SIGNIFICANT CHANGE UP (ref 0.2–1.2)
BILIRUB UR-MCNC: NEGATIVE — SIGNIFICANT CHANGE UP
BUN SERPL-MCNC: 15 MG/DL — SIGNIFICANT CHANGE UP (ref 7–18)
BUN SERPL-MCNC: 17 MG/DL — SIGNIFICANT CHANGE UP (ref 7–18)
CALCIUM SERPL-MCNC: 8.4 MG/DL — SIGNIFICANT CHANGE UP (ref 8.4–10.5)
CALCIUM SERPL-MCNC: 8.5 MG/DL — SIGNIFICANT CHANGE UP (ref 8.4–10.5)
CHLORIDE SERPL-SCNC: 107 MMOL/L — SIGNIFICANT CHANGE UP (ref 96–108)
CHLORIDE SERPL-SCNC: 108 MMOL/L — SIGNIFICANT CHANGE UP (ref 96–108)
CO2 SERPL-SCNC: 22 MMOL/L — SIGNIFICANT CHANGE UP (ref 22–31)
CO2 SERPL-SCNC: 24 MMOL/L — SIGNIFICANT CHANGE UP (ref 22–31)
COLOR SPEC: SIGNIFICANT CHANGE UP
CREAT SERPL-MCNC: 1.4 MG/DL — HIGH (ref 0.5–1.3)
CREAT SERPL-MCNC: 1.42 MG/DL — HIGH (ref 0.5–1.3)
DIFF PNL FLD: NEGATIVE — SIGNIFICANT CHANGE UP
EGFR: 49 ML/MIN/1.73M2 — LOW
EGFR: 49 ML/MIN/1.73M2 — LOW
EGFR: 50 ML/MIN/1.73M2 — LOW
EGFR: 50 ML/MIN/1.73M2 — LOW
EOSINOPHIL # BLD AUTO: 0 K/UL — SIGNIFICANT CHANGE UP (ref 0–0.5)
EOSINOPHIL # BLD AUTO: 0.03 K/UL — SIGNIFICANT CHANGE UP (ref 0–0.5)
EOSINOPHIL NFR BLD AUTO: 0 % — SIGNIFICANT CHANGE UP (ref 0–6)
EOSINOPHIL NFR BLD AUTO: 0.3 % — SIGNIFICANT CHANGE UP (ref 0–6)
EPI CELLS # UR: PRESENT
GLUCOSE BLDC GLUCOMTR-MCNC: 141 MG/DL — HIGH (ref 70–99)
GLUCOSE SERPL-MCNC: 167 MG/DL — HIGH (ref 70–99)
GLUCOSE SERPL-MCNC: 173 MG/DL — HIGH (ref 70–99)
GLUCOSE UR QL: NEGATIVE MG/DL — SIGNIFICANT CHANGE UP
HCT VFR BLD CALC: 42.1 % — SIGNIFICANT CHANGE UP (ref 39–50)
HCT VFR BLD CALC: 45 % — SIGNIFICANT CHANGE UP (ref 39–50)
HCV AB S/CO SERPL IA: 0.21 S/CO — SIGNIFICANT CHANGE UP (ref 0–0.79)
HCV AB SERPL-IMP: SIGNIFICANT CHANGE UP
HGB BLD-MCNC: 13.9 G/DL — SIGNIFICANT CHANGE UP (ref 13–17)
HGB BLD-MCNC: 14.6 G/DL — SIGNIFICANT CHANGE UP (ref 13–17)
HIV 1 & 2 AB SERPL IA.RAPID: SIGNIFICANT CHANGE UP
IMM GRANULOCYTES NFR BLD AUTO: 0.3 % — SIGNIFICANT CHANGE UP (ref 0–0.9)
IMM GRANULOCYTES NFR BLD AUTO: 0.4 % — SIGNIFICANT CHANGE UP (ref 0–0.9)
KETONES UR QL: ABNORMAL MG/DL
LACTATE SERPL-SCNC: 2.1 MMOL/L — HIGH (ref 0.7–2)
LACTATE SERPL-SCNC: 2.7 MMOL/L — HIGH (ref 0.7–2)
LACTATE SERPL-SCNC: 2.9 MMOL/L — HIGH (ref 0.7–2)
LACTATE SERPL-SCNC: 4.1 MMOL/L — CRITICAL HIGH (ref 0.7–2)
LACTATE SERPL-SCNC: 4.7 MMOL/L — CRITICAL HIGH (ref 0.7–2)
LEUKOCYTE ESTERASE UR-ACNC: NEGATIVE — SIGNIFICANT CHANGE UP
LYMPHOCYTES # BLD AUTO: 0.4 K/UL — LOW (ref 1–3.3)
LYMPHOCYTES # BLD AUTO: 2.36 K/UL — SIGNIFICANT CHANGE UP (ref 1–3.3)
LYMPHOCYTES # BLD AUTO: 22.7 % — SIGNIFICANT CHANGE UP (ref 13–44)
LYMPHOCYTES # BLD AUTO: 4.1 % — LOW (ref 13–44)
MAGNESIUM SERPL-MCNC: 1.9 MG/DL — SIGNIFICANT CHANGE UP (ref 1.6–2.6)
MANUAL SMEAR VERIFICATION: SIGNIFICANT CHANGE UP
MCHC RBC-ENTMCNC: 29.6 PG — SIGNIFICANT CHANGE UP (ref 27–34)
MCHC RBC-ENTMCNC: 30.6 PG — SIGNIFICANT CHANGE UP (ref 27–34)
MCHC RBC-ENTMCNC: 32.4 G/DL — SIGNIFICANT CHANGE UP (ref 32–36)
MCHC RBC-ENTMCNC: 33 G/DL — SIGNIFICANT CHANGE UP (ref 32–36)
MCV RBC AUTO: 91.3 FL — SIGNIFICANT CHANGE UP (ref 80–100)
MCV RBC AUTO: 92.7 FL — SIGNIFICANT CHANGE UP (ref 80–100)
MONOCYTES # BLD AUTO: 0.56 K/UL — SIGNIFICANT CHANGE UP (ref 0–0.9)
MONOCYTES # BLD AUTO: 0.57 K/UL — SIGNIFICANT CHANGE UP (ref 0–0.9)
MONOCYTES NFR BLD AUTO: 5.4 % — SIGNIFICANT CHANGE UP (ref 2–14)
MONOCYTES NFR BLD AUTO: 5.9 % — SIGNIFICANT CHANGE UP (ref 2–14)
NEUTROPHILS # BLD AUTO: 7.38 K/UL — SIGNIFICANT CHANGE UP (ref 1.8–7.4)
NEUTROPHILS # BLD AUTO: 8.61 K/UL — HIGH (ref 1.8–7.4)
NEUTROPHILS NFR BLD AUTO: 71 % — SIGNIFICANT CHANGE UP (ref 43–77)
NEUTROPHILS NFR BLD AUTO: 89.4 % — HIGH (ref 43–77)
NITRITE UR-MCNC: NEGATIVE — SIGNIFICANT CHANGE UP
NRBC BLD AUTO-RTO: 0 /100 WBCS — SIGNIFICANT CHANGE UP (ref 0–0)
NRBC BLD AUTO-RTO: 0 /100 WBCS — SIGNIFICANT CHANGE UP (ref 0–0)
NT-PROBNP SERPL-SCNC: 635 PG/ML — HIGH (ref 0–450)
PH UR: 5.5 — SIGNIFICANT CHANGE UP (ref 5–8)
PHOSPHATE SERPL-MCNC: 1.9 MG/DL — LOW (ref 2.5–4.5)
PLAT MORPH BLD: NORMAL — SIGNIFICANT CHANGE UP
PLATELET # BLD AUTO: 169 K/UL — SIGNIFICANT CHANGE UP (ref 150–400)
PLATELET # BLD AUTO: 209 K/UL — SIGNIFICANT CHANGE UP (ref 150–400)
POTASSIUM SERPL-MCNC: 3.5 MMOL/L — SIGNIFICANT CHANGE UP (ref 3.5–5.3)
POTASSIUM SERPL-MCNC: 4.7 MMOL/L — SIGNIFICANT CHANGE UP (ref 3.5–5.3)
POTASSIUM SERPL-SCNC: 3.5 MMOL/L — SIGNIFICANT CHANGE UP (ref 3.5–5.3)
POTASSIUM SERPL-SCNC: 4.7 MMOL/L — SIGNIFICANT CHANGE UP (ref 3.5–5.3)
PROT SERPL-MCNC: 6.8 G/DL — SIGNIFICANT CHANGE UP (ref 6–8.3)
PROT SERPL-MCNC: 6.9 G/DL — SIGNIFICANT CHANGE UP (ref 6–8.3)
PROT UR-MCNC: 30 MG/DL
RBC # BLD: 4.54 M/UL — SIGNIFICANT CHANGE UP (ref 4.2–5.8)
RBC # BLD: 4.93 M/UL — SIGNIFICANT CHANGE UP (ref 4.2–5.8)
RBC # FLD: 13.5 % — SIGNIFICANT CHANGE UP (ref 10.3–14.5)
RBC # FLD: 13.8 % — SIGNIFICANT CHANGE UP (ref 10.3–14.5)
RBC BLD AUTO: ABNORMAL
RBC CASTS # UR COMP ASSIST: 0 /HPF — SIGNIFICANT CHANGE UP (ref 0–4)
SODIUM SERPL-SCNC: 139 MMOL/L — SIGNIFICANT CHANGE UP (ref 135–145)
SODIUM SERPL-SCNC: 140 MMOL/L — SIGNIFICANT CHANGE UP (ref 135–145)
SP GR SPEC: 1.02 — SIGNIFICANT CHANGE UP (ref 1–1.03)
TROPONIN I, HIGH SENSITIVITY RESULT: 16.7 NG/L — SIGNIFICANT CHANGE UP
UROBILINOGEN FLD QL: 1 MG/DL — SIGNIFICANT CHANGE UP (ref 0.2–1)
WBC # BLD: 10.39 K/UL — SIGNIFICANT CHANGE UP (ref 3.8–10.5)
WBC # BLD: 9.64 K/UL — SIGNIFICANT CHANGE UP (ref 3.8–10.5)
WBC # FLD AUTO: 10.39 K/UL — SIGNIFICANT CHANGE UP (ref 3.8–10.5)
WBC # FLD AUTO: 9.64 K/UL — SIGNIFICANT CHANGE UP (ref 3.8–10.5)
WBC UR QL: 0 /HPF — SIGNIFICANT CHANGE UP (ref 0–5)

## 2025-05-15 PROCEDURE — 74176 CT ABD & PELVIS W/O CONTRAST: CPT | Mod: 26

## 2025-05-15 PROCEDURE — 99285 EMERGENCY DEPT VISIT HI MDM: CPT

## 2025-05-15 PROCEDURE — 71045 X-RAY EXAM CHEST 1 VIEW: CPT | Mod: 26

## 2025-05-15 PROCEDURE — 99223 1ST HOSP IP/OBS HIGH 75: CPT | Mod: GC

## 2025-05-15 PROCEDURE — 70450 CT HEAD/BRAIN W/O DYE: CPT | Mod: 26

## 2025-05-15 PROCEDURE — 71250 CT THORAX DX C-: CPT | Mod: 26

## 2025-05-15 PROCEDURE — 93010 ELECTROCARDIOGRAM REPORT: CPT | Mod: 76

## 2025-05-15 RX ORDER — METRONIDAZOLE 250 MG
500 TABLET ORAL EVERY 12 HOURS
Refills: 0 | Status: DISCONTINUED | OUTPATIENT
Start: 2025-05-15 | End: 2025-05-18

## 2025-05-15 RX ORDER — DONEPEZIL HYDROCHLORIDE 5 MG/1
1 TABLET ORAL
Refills: 0 | DISCHARGE

## 2025-05-15 RX ORDER — METHYLPREDNISOLONE ACETATE 80 MG/ML
60 INJECTION, SUSPENSION INTRA-ARTICULAR; INTRALESIONAL; INTRAMUSCULAR; SOFT TISSUE ONCE
Refills: 0 | Status: COMPLETED | OUTPATIENT
Start: 2025-05-15 | End: 2025-05-15

## 2025-05-15 RX ORDER — MELATONIN 5 MG
5 TABLET ORAL AT BEDTIME
Refills: 0 | Status: DISCONTINUED | OUTPATIENT
Start: 2025-05-15 | End: 2025-05-23

## 2025-05-15 RX ORDER — METRONIDAZOLE 250 MG
TABLET ORAL
Refills: 0 | Status: DISCONTINUED | OUTPATIENT
Start: 2025-05-15 | End: 2025-05-18

## 2025-05-15 RX ORDER — QUETIAPINE FUMARATE 25 MG/1
100 TABLET ORAL
Refills: 0 | Status: DISCONTINUED | OUTPATIENT
Start: 2025-05-15 | End: 2025-05-19

## 2025-05-15 RX ORDER — METRONIDAZOLE 250 MG
500 TABLET ORAL ONCE
Refills: 0 | Status: COMPLETED | OUTPATIENT
Start: 2025-05-15 | End: 2025-05-15

## 2025-05-15 RX ORDER — ONDANSETRON HCL/PF 4 MG/2 ML
4 VIAL (ML) INJECTION EVERY 8 HOURS
Refills: 0 | Status: DISCONTINUED | OUTPATIENT
Start: 2025-05-15 | End: 2025-05-23

## 2025-05-15 RX ORDER — POTASSIUM PHOSPHATE, MONOBASIC POTASSIUM PHOSPHATE, DIBASIC INJECTION, 236; 224 MG/ML; MG/ML
15 SOLUTION, CONCENTRATE INTRAVENOUS ONCE
Refills: 0 | Status: COMPLETED | OUTPATIENT
Start: 2025-05-15 | End: 2025-05-15

## 2025-05-15 RX ORDER — MELATONIN 5 MG
2 TABLET ORAL
Refills: 0 | DISCHARGE

## 2025-05-15 RX ORDER — AMLODIPINE BESYLATE 10 MG/1
5 TABLET ORAL DAILY
Refills: 0 | Status: DISCONTINUED | OUTPATIENT
Start: 2025-05-15 | End: 2025-05-23

## 2025-05-15 RX ORDER — CEFTRIAXONE 500 MG/1
1000 INJECTION, POWDER, FOR SOLUTION INTRAMUSCULAR; INTRAVENOUS EVERY 24 HOURS
Refills: 0 | Status: COMPLETED | OUTPATIENT
Start: 2025-05-16 | End: 2025-05-20

## 2025-05-15 RX ORDER — AZITHROMYCIN 250 MG
500 CAPSULE ORAL ONCE
Refills: 0 | Status: COMPLETED | OUTPATIENT
Start: 2025-05-15 | End: 2025-05-15

## 2025-05-15 RX ORDER — AMLODIPINE BESYLATE 10 MG/1
1 TABLET ORAL
Refills: 0 | DISCHARGE

## 2025-05-15 RX ORDER — POLYETHYLENE GLYCOL 3350 17 G/17G
17 POWDER, FOR SOLUTION ORAL
Refills: 0 | DISCHARGE

## 2025-05-15 RX ORDER — HEPARIN SODIUM 1000 [USP'U]/ML
5000 INJECTION INTRAVENOUS; SUBCUTANEOUS EVERY 8 HOURS
Refills: 0 | Status: DISCONTINUED | OUTPATIENT
Start: 2025-05-15 | End: 2025-05-23

## 2025-05-15 RX ORDER — DONEPEZIL HYDROCHLORIDE 5 MG/1
10 TABLET ORAL AT BEDTIME
Refills: 0 | Status: DISCONTINUED | OUTPATIENT
Start: 2025-05-15 | End: 2025-05-23

## 2025-05-15 RX ORDER — QUETIAPINE FUMARATE 25 MG/1
100 TABLET ORAL DAILY
Refills: 0 | Status: DISCONTINUED | OUTPATIENT
Start: 2025-05-15 | End: 2025-05-15

## 2025-05-15 RX ORDER — IPRATROPIUM BROMIDE AND ALBUTEROL SULFATE .5; 2.5 MG/3ML; MG/3ML
3 SOLUTION RESPIRATORY (INHALATION) ONCE
Refills: 0 | Status: COMPLETED | OUTPATIENT
Start: 2025-05-15 | End: 2025-05-15

## 2025-05-15 RX ORDER — ACETAMINOPHEN 500 MG/5ML
2 LIQUID (ML) ORAL
Refills: 0 | DISCHARGE

## 2025-05-15 RX ORDER — CEFTRIAXONE 500 MG/1
1000 INJECTION, POWDER, FOR SOLUTION INTRAMUSCULAR; INTRAVENOUS ONCE
Refills: 0 | Status: COMPLETED | OUTPATIENT
Start: 2025-05-15 | End: 2025-05-15

## 2025-05-15 RX ORDER — BISACODYL 5 MG
1 TABLET, DELAYED RELEASE (ENTERIC COATED) ORAL
Refills: 0 | DISCHARGE

## 2025-05-15 RX ADMIN — Medication 250 MILLIGRAM(S): at 07:40

## 2025-05-15 RX ADMIN — Medication 100 MILLIGRAM(S): at 20:28

## 2025-05-15 RX ADMIN — HEPARIN SODIUM 5000 UNIT(S): 1000 INJECTION INTRAVENOUS; SUBCUTANEOUS at 21:06

## 2025-05-15 RX ADMIN — AMLODIPINE BESYLATE 5 MILLIGRAM(S): 10 TABLET ORAL at 15:15

## 2025-05-15 RX ADMIN — POTASSIUM PHOSPHATE, MONOBASIC POTASSIUM PHOSPHATE, DIBASIC INJECTION, 62.5 MILLIMOLE(S): 236; 224 SOLUTION, CONCENTRATE INTRAVENOUS at 22:53

## 2025-05-15 RX ADMIN — Medication 250 MILLIGRAM(S): at 20:29

## 2025-05-15 RX ADMIN — QUETIAPINE FUMARATE 100 MILLIGRAM(S): 25 TABLET ORAL at 20:30

## 2025-05-15 RX ADMIN — HEPARIN SODIUM 5000 UNIT(S): 1000 INJECTION INTRAVENOUS; SUBCUTANEOUS at 15:14

## 2025-05-15 RX ADMIN — Medication 100 MILLIGRAM(S): at 10:49

## 2025-05-15 RX ADMIN — CEFTRIAXONE 100 MILLIGRAM(S): 500 INJECTION, POWDER, FOR SOLUTION INTRAMUSCULAR; INTRAVENOUS at 06:43

## 2025-05-15 RX ADMIN — Medication 100 MILLILITER(S): at 13:53

## 2025-05-15 RX ADMIN — DONEPEZIL HYDROCHLORIDE 10 MILLIGRAM(S): 5 TABLET ORAL at 21:06

## 2025-05-15 RX ADMIN — Medication 2600 MILLILITER(S): at 06:42

## 2025-05-15 RX ADMIN — Medication 1000 UNIT(S): at 13:53

## 2025-05-15 RX ADMIN — Medication 5 MILLIGRAM(S): at 21:06

## 2025-05-15 RX ADMIN — Medication 250 MILLIGRAM(S): at 13:52

## 2025-05-15 RX ADMIN — IPRATROPIUM BROMIDE AND ALBUTEROL SULFATE 3 MILLILITER(S): .5; 2.5 SOLUTION RESPIRATORY (INHALATION) at 01:52

## 2025-05-15 RX ADMIN — METHYLPREDNISOLONE ACETATE 60 MILLIGRAM(S): 80 INJECTION, SUSPENSION INTRA-ARTICULAR; INTRALESIONAL; INTRAMUSCULAR; SOFT TISSUE at 01:52

## 2025-05-15 NOTE — H&P ADULT - PROBLEM SELECTOR PLAN 5
p/w Cr (1.14 on 04/25)  kait prerenal iso dehydration  -c/w IVF  -monitor Scr p/w Cr (1.14 on 04/25)  likely prerenal iso dehydration  -c/w IVF  -monitor Scr

## 2025-05-15 NOTE — H&P ADULT - ATTENDING COMMENTS
84 year old male, from [facility] assisted living, history of dementia with behavioral disturbances, HTN, who presented with an episode of diarrhea, followed by choking while on the toilet. EMS came and patient brought to hospital. Due to patient's dementia, unable to provide history, there was aid at bedside who helped with history. Since discharge from [facility] 1 month ago, his behavioral changes havent improved.  He eats regular solid food with dentures, however patient's son Regino notes that patient has had episodes of dysphagia in the past and is how dementia was diagnosed  In the ED, patient was noted hypoxic, CT chest showing bilateral ground glass opacities, CT abdomen revealing colitis.    Vital Signs Last 24 Hrs  T(C): 36.8 (15 May 2025 16:06), Max: 37.2 (15 May 2025 13:25)  T(F): 98.2 (15 May 2025 16:06), Max: 99 (15 May 2025 13:25)  HR: 89 (15 May 2025 16:06) (76 - 94)  BP: 162/77 (15 May 2025 16:06) (128/71 - 169/90)  BP(mean): 99 (15 May 2025 05:13) (99 - 99)  RR: 19 (15 May 2025 16:06) (18 - 24)  SpO2: 97% (15 May 2025 16:06) (88% - 98%)    Parameters below as of 15 May 2025 16:06  Patient On (Oxygen Delivery Method): nasal cannula    Physical: elderly gentleman, heart regular, lungs with crackles, abd mildly tender to palpation, soft, bowel sounds present no lower ext edema. aaox1-2. Oxygen saturation 88% on room air    84 year old male, history of dementia with behavioral disturbance, HTN, who presents with diarrhea and a choking episode, found with hypoxia likely due to aspiration in the setting of colitis.    #Acute hypoxic Respiratory Failure  #Aspiration pneumonia vs pneumonitis  #Acute colitis  #Diarrhea  #HTN  #lactate elevation  #Acute kidney injury      Continue nasal cannula 2lpm  Suspect his vomiting episode led to pneumonitis and pneumonia causing hypoxia, wean off o2 as tolerated  Start ceftriaxone for aspiration, metronidazole started in addition for colitis  Likely colitis leading to nausea vomiting leading to aspiration  NPO for bowel rest, advance diet as tolerated.  Can check stool studies if diarrhea not resolved, do not continue outpatient miralax  Continue isotonic IV fluids, trend creatinine,  Repeat lactate until normalizes, no evidence of acidosis  If behavior uncontrolled with home meds, consider IM olanzapine.  Plan was discussed with patient's son, Regino, Explained plan of care. We re-discussed goals of care, per patient's wishes, he does not want resuscitation only if he has a reversible disease. I explained that dementia is a irreversible disease. patient to remain full code trial of resuscitation

## 2025-05-15 NOTE — ED ADULT NURSE NOTE - NSFALLUNIVINTERV_ED_ALL_ED
Bed/Stretcher in lowest position, wheels locked, appropriate side rails in place/Call bell, personal items and telephone in reach/Instruct patient to call for assistance before getting out of bed/chair/stretcher/Non-slip footwear applied when patient is off stretcher/[place] to call system/Physically safe environment - no spills, clutter or unnecessary equipment/Purposeful proactive rounding/Room/bathroom lighting operational, light cord in reach

## 2025-05-15 NOTE — PATIENT PROFILE ADULT - FALL HARM RISK - HARM RISK INTERVENTIONS
Assistance OOB with selected safe patient handling equipment/Communicate Risk of Fall with Harm to all staff/Monitor for mental status changes/Move patient closer to nurses' station/Reinforce activity limits and safety measures with patient and family/Reorient to person, place and time as needed/Tailored Fall Risk Interventions/Toileting schedule using arm’s reach rule for commode and bathroom/Use of alarms - bed, chair and/or voice tab/Visual Cue: Yellow wristband and red socks/Bed in lowest position, wheels locked, appropriate side rails in place/Call bell, personal items and telephone in reach/Instruct patient to call for assistance before getting out of bed or chair/Non-slip footwear when patient is out of bed/[place] to call system/Physically safe environment - no spills, clutter or unnecessary equipment/Purposeful Proactive Rounding/Room/bathroom lighting operational, light cord in reach

## 2025-05-15 NOTE — H&P ADULT - PROBLEM SELECTOR PLAN 3
p/w 1 days of watery stool, nausea and vomiting  CTAP: Mild wall thickening of the cecum and ascending colon could reflect underdistention versus colitis.    -started Ceftriaxone and flagyl  -send for GI PCR, stool Cx, ova and parasites if more loose stool p/w 1 days of watery stool, nausea and vomiting  CTAP: Mild wall thickening of the cecum and ascending colon could reflect underdistention versus colitis.  -started Ceftriaxone and flagyl  -send for GI PCR, stool Cx, ova and parasites if more loose stool

## 2025-05-15 NOTE — H&P ADULT - TIME BILLING
Review of assisted living records, discussion with aid, collateral history from son, review of labs and imaging, ordering of further testing, formulation of plan, medical and medication management, documentation of encounter

## 2025-05-15 NOTE — ED PROVIDER NOTE - PHYSICAL EXAMINATION
General: Well appearing, no acute distress, appears stated age  HEENT: normocephalic, atraumatic   Respiratory: increased work of breathing, rhonchi  MSK: no swelling or tenderness of lower extremities, moving all extremities spontaneously   Skin: warm, dry  Neuro: A&Ox1, cranial nerves II-XII intact, 5/5 strength in all extremities, no sensory deficits, normal gait   ABD: soft, nontender, nondistended, no guarding, no rebound, no CVA tenderness

## 2025-05-15 NOTE — H&P ADULT - PROBLEM SELECTOR PLAN 1
p/w sob, cough and a choking spell  CT Chest shows Trace secretions in the trachea. Bilateral groundglass nodular opacities with superimposed areas of nodular consolidation. Mild bibasilar atelectasis.  AHRF 2/2 aspiration pneumonia    -Start  rocephin 1g qd  -f/u strep ag, legionella, procalcitonin, mycoplasma igm  -NPO except meds  -aspiration precaution  -f/u Speech and swallow eval  -O2 supplementation, wean off as tolerated p/w sob, cough and a choking spell  CT Chest shows Trace secretions in the trachea. Bilateral groundglass nodular opacities with superimposed areas of nodular consolidation. Mild bibasilar atelectasis.  AHRF 2/2 aspiration pneumonia    -Started  rocephin 1g qd  -f/u strep ag, legionella, procalcitonin, mycoplasma igm  -NPO except meds  -aspiration precaution  -f/u Speech and swallow eval  -O2 supplementation, wean off as tolerated

## 2025-05-15 NOTE — ED ADULT TRIAGE NOTE - CCCP TRG CHIEF CMPLNT
[FreeTextEntry1] : Interval History: Pt returns today s/p SCS trial. He had an excellent trial. He reports 70% relief. Was able to function better. Wishes to proceed to SCS implant.   HPI: 79 yom presents w/ severe back pain. He had surgery in May with Dr. Lombardi. He has severe pain radiating down his bilateral lower extremities. Quality of life is impaired. There has been a severe exacerbation of the patient's chronic pain. He is on Plavix.   Interventions: KEYSHAWN hypoxia/nausea, vomiting, diarrhea

## 2025-05-15 NOTE — ED ADULT NURSE REASSESSMENT NOTE - NS ED NURSE REASSESS COMMENT FT1
RN contacted [facility] as patient takes meds crushed as per aide at bedside. Depakote DR should not be crushed as per pharmacy needs to be changed to syrup or IV. Dr. Correa notified.

## 2025-05-15 NOTE — ED ADULT NURSE NOTE - OBJECTIVE STATEMENT
pt biba with c/o n/v/d. upon assessment pt denies abd pain and n/v/d. pt has hx of dementia. upon arrival pt satting 88% on room air, pt placed on NRB.

## 2025-05-15 NOTE — H&P ADULT - NSHPPHYSICALEXAM_GEN_ALL_CORE
T(C): 36.7 (05-15-25 @ 07:38), Max: 36.7 (05-15-25 @ 07:38)  HR: 86 (05-15-25 @ 07:38) (76 - 86)  BP: 169/90 (05-15-25 @ 07:38) (128/71 - 169/90)  RR: 18 (05-15-25 @ 07:38) (18 - 24)  SpO2: 98% (05-15-25 @ 07:38) (88% - 98%)      GENERAL: NAD, speaks in full sentences, no signs of respiratory distress  HEAD:  Atraumatic, Normocephalic  EYES: EOMI, PERRLA, conjunctiva and sclera clear  NECK: Supple, No JVD  CHEST/LUNG: Clear to auscultation bilaterally; No wheeze; No crackles; No accessory muscles used  HEART: Regular rate and rhythm; No murmurs;   ABDOMEN: Soft, Nontender, Nondistended; Bowel sounds present; No guarding  EXTREMITIES:  2+ Peripheral Pulses, No cyanosis or edema  PSYCH: AAOx3  NEUROLOGY: non-focal  SKIN: No rashes or lesions T(C): 36.7 (05-15-25 @ 07:38), Max: 36.7 (05-15-25 @ 07:38)  HR: 86 (05-15-25 @ 07:38) (76 - 86)  BP: 169/90 (05-15-25 @ 07:38) (128/71 - 169/90)  RR: 18 (05-15-25 @ 07:38) (18 - 24)  SpO2: 98% (05-15-25 @ 07:38) (88% - 98%)      GENERAL: NAD, speaks in full sentences, no signs of respiratory distress  HEAD:  Atraumatic, Normocephalic  EYES: EOMI, PERRLA, conjunctiva and sclera clear  NECK: Supple, No JVD  CHEST/LUNG: Bl crackles present; No wheeze  HEART: Regular rate and rhythm; No murmurs;   ABDOMEN: Soft, Nontender, Nondistended; Bowel sounds present; No guarding  EXTREMITIES:  2+ Peripheral Pulses, No cyanosis or edema  PSYCH: AAOx2  NEUROLOGY: No motor or sensory deficit  SKIN: No rashes or lesions

## 2025-05-15 NOTE — H&P ADULT - ASSESSMENT
84[facility], ambulates with walker, PMH of Dementia with behavioural issues, HTN, presented to ED with multiple episodes of loose bm since yesterday with associated nausea and vomiting, Has a choking episode yesterday, Patient hypoxic to 90% on RA, Ct showing colitis and Bilateral groundglass nodular opacities with superimposed areas of nodular consolidation. Admitted for AHRF 2/2 ?aspiration pneumonia and colitis.  84y/M, from [facility] assisted [facility], ambulates with walker, PMH of Dementia with behavioural issues, HTN, presented to ED with multiple episodes of loose bm since yesterday with associated nausea and vomiting, Has a choking episode yesterday, Patient hypoxic to 90% on RA, Ct showing colitis and Bilateral groundglass nodular opacities with superimposed areas of nodular consolidation. Admitted for AHRF 2/2 aspiration pneumonia due to colitis.

## 2025-05-15 NOTE — ED PROVIDER NOTE - CLINICAL SUMMARY MEDICAL DECISION MAKING FREE TEXT BOX
Patient with hypoxia, troponin proBNP COVID flu all negative CT with pneumonia.  PE less likely given not tachycardic

## 2025-05-15 NOTE — H&P ADULT - PROBLEM SELECTOR PLAN 7
has h/o dementia with behavioural issues on donepezil, Depakote and Seroquel at home  c/w home meds  may give Zyprez 2.5mg im has h/o dementia with behavioural issues on donepezil, Depakote and Seroquel at home  c/w home meds  may give Zyprexa 2.5mg im prn for severe agitation  monitor qtc

## 2025-05-15 NOTE — H&P ADULT - HISTORY OF PRESENT ILLNESS
84y/M, from [facility] assisted living, ambulates with walker, PMH of Dementia with behavioural issues, HTN, presented to ED with multiple episodes of loose bm since yesterday with associated nausea and vomiting, 84y/M, from [facility] assisted living, ambulates with walker, PMH of Dementia with behavioural issues, HTN, presented to ED with multiple episodes of loose bm since yesterday with associated nausea and vomiting, As per HHA by bedside, patient had acute onset watery bowel movement, multiple episodes, no blood in stool. He had an episode of nausea and vomiting, patient had a choking episode too. Denies any fever and chills, chest pain, SOB, palpitation.   Patient was admitted from 04/11-04/15 to [facility] for agitation.   84y/M, from [facility] assisted living, ambulates with walker, PMH of Dementia with behavioural issues, HTN, presented to ED with multiple episodes of loose bm since yesterday with associated nausea and vomiting, As per HHA by bedside, patient had acute onset watery bowel movement, multiple episodes, no blood in stool. He had an episode of nausea and vomiting, patient had a choking episode too. Denies any fever and chills, chest pain, SOB, palpitation.   Patient was admitted from 04/11-04/15 to [facility] for agitation. As per HHA, there has been no improvement in his agitation, he sundowns.

## 2025-05-15 NOTE — ED PROVIDER NOTE - OBJECTIVE STATEMENT
83 yo male with dementia brought in by his aid because of vomiting and diarrhea.  Pt was found to be tachypneic with hypoxia to 88%Pt denies cough but is not truly able to participate in his history

## 2025-05-16 LAB
ALBUMIN SERPL ELPH-MCNC: 2.6 G/DL — LOW (ref 3.5–5)
ALP SERPL-CCNC: 50 U/L — SIGNIFICANT CHANGE UP (ref 40–120)
ALT FLD-CCNC: 19 U/L DA — SIGNIFICANT CHANGE UP (ref 10–60)
ANION GAP SERPL CALC-SCNC: 6 MMOL/L — SIGNIFICANT CHANGE UP (ref 5–17)
AST SERPL-CCNC: 26 U/L — SIGNIFICANT CHANGE UP (ref 10–40)
BASOPHILS # BLD AUTO: 0.02 K/UL — SIGNIFICANT CHANGE UP (ref 0–0.2)
BASOPHILS NFR BLD AUTO: 0.2 % — SIGNIFICANT CHANGE UP (ref 0–2)
BILIRUB SERPL-MCNC: 0.5 MG/DL — SIGNIFICANT CHANGE UP (ref 0.2–1.2)
BUN SERPL-MCNC: 21 MG/DL — HIGH (ref 7–18)
CALCIUM SERPL-MCNC: 8.1 MG/DL — LOW (ref 8.4–10.5)
CHLORIDE SERPL-SCNC: 110 MMOL/L — HIGH (ref 96–108)
CO2 SERPL-SCNC: 26 MMOL/L — SIGNIFICANT CHANGE UP (ref 22–31)
CREAT SERPL-MCNC: 1.13 MG/DL — SIGNIFICANT CHANGE UP (ref 0.5–1.3)
EGFR: 65 ML/MIN/1.73M2 — SIGNIFICANT CHANGE UP
EGFR: 65 ML/MIN/1.73M2 — SIGNIFICANT CHANGE UP
EOSINOPHIL # BLD AUTO: 0 K/UL — SIGNIFICANT CHANGE UP (ref 0–0.5)
EOSINOPHIL NFR BLD AUTO: 0 % — SIGNIFICANT CHANGE UP (ref 0–6)
GLUCOSE BLDC GLUCOMTR-MCNC: 102 MG/DL — HIGH (ref 70–99)
GLUCOSE BLDC GLUCOMTR-MCNC: 104 MG/DL — HIGH (ref 70–99)
GLUCOSE BLDC GLUCOMTR-MCNC: 105 MG/DL — HIGH (ref 70–99)
GLUCOSE SERPL-MCNC: 103 MG/DL — HIGH (ref 70–99)
HCT VFR BLD CALC: 37.6 % — LOW (ref 39–50)
HGB BLD-MCNC: 12.3 G/DL — LOW (ref 13–17)
IMM GRANULOCYTES NFR BLD AUTO: 0.3 % — SIGNIFICANT CHANGE UP (ref 0–0.9)
LACTATE SERPL-SCNC: 1.5 MMOL/L — SIGNIFICANT CHANGE UP (ref 0.7–2)
LYMPHOCYTES # BLD AUTO: 1.19 K/UL — SIGNIFICANT CHANGE UP (ref 1–3.3)
LYMPHOCYTES # BLD AUTO: 10.5 % — LOW (ref 13–44)
MAGNESIUM SERPL-MCNC: 2.1 MG/DL — SIGNIFICANT CHANGE UP (ref 1.6–2.6)
MCHC RBC-ENTMCNC: 29.8 PG — SIGNIFICANT CHANGE UP (ref 27–34)
MCHC RBC-ENTMCNC: 32.7 G/DL — SIGNIFICANT CHANGE UP (ref 32–36)
MCV RBC AUTO: 91 FL — SIGNIFICANT CHANGE UP (ref 80–100)
MONOCYTES # BLD AUTO: 0.76 K/UL — SIGNIFICANT CHANGE UP (ref 0–0.9)
MONOCYTES NFR BLD AUTO: 6.7 % — SIGNIFICANT CHANGE UP (ref 2–14)
MRSA PCR RESULT.: SIGNIFICANT CHANGE UP
NEUTROPHILS # BLD AUTO: 9.37 K/UL — HIGH (ref 1.8–7.4)
NEUTROPHILS NFR BLD AUTO: 82.3 % — HIGH (ref 43–77)
NRBC BLD AUTO-RTO: 0 /100 WBCS — SIGNIFICANT CHANGE UP (ref 0–0)
PHOSPHATE SERPL-MCNC: 2.9 MG/DL — SIGNIFICANT CHANGE UP (ref 2.5–4.5)
PLATELET # BLD AUTO: 158 K/UL — SIGNIFICANT CHANGE UP (ref 150–400)
POTASSIUM SERPL-MCNC: 4.5 MMOL/L — SIGNIFICANT CHANGE UP (ref 3.5–5.3)
POTASSIUM SERPL-SCNC: 4.5 MMOL/L — SIGNIFICANT CHANGE UP (ref 3.5–5.3)
PROCALCITONIN SERPL-MCNC: 4.47 NG/ML — HIGH (ref 0.02–0.1)
PROT SERPL-MCNC: 5.9 G/DL — LOW (ref 6–8.3)
RBC # BLD: 4.13 M/UL — LOW (ref 4.2–5.8)
RBC # FLD: 14.2 % — SIGNIFICANT CHANGE UP (ref 10.3–14.5)
S AUREUS DNA NOSE QL NAA+PROBE: SIGNIFICANT CHANGE UP
SODIUM SERPL-SCNC: 142 MMOL/L — SIGNIFICANT CHANGE UP (ref 135–145)
WBC # BLD: 11.37 K/UL — HIGH (ref 3.8–10.5)
WBC # FLD AUTO: 11.37 K/UL — HIGH (ref 3.8–10.5)

## 2025-05-16 PROCEDURE — 99233 SBSQ HOSP IP/OBS HIGH 50: CPT

## 2025-05-16 RX ORDER — IPRATROPIUM BROMIDE AND ALBUTEROL SULFATE .5; 2.5 MG/3ML; MG/3ML
3 SOLUTION RESPIRATORY (INHALATION) ONCE
Refills: 0 | Status: COMPLETED | OUTPATIENT
Start: 2025-05-16 | End: 2025-05-16

## 2025-05-16 RX ADMIN — DONEPEZIL HYDROCHLORIDE 10 MILLIGRAM(S): 5 TABLET ORAL at 21:11

## 2025-05-16 RX ADMIN — HEPARIN SODIUM 5000 UNIT(S): 1000 INJECTION INTRAVENOUS; SUBCUTANEOUS at 15:46

## 2025-05-16 RX ADMIN — HEPARIN SODIUM 5000 UNIT(S): 1000 INJECTION INTRAVENOUS; SUBCUTANEOUS at 05:34

## 2025-05-16 RX ADMIN — HEPARIN SODIUM 5000 UNIT(S): 1000 INJECTION INTRAVENOUS; SUBCUTANEOUS at 21:11

## 2025-05-16 RX ADMIN — IPRATROPIUM BROMIDE AND ALBUTEROL SULFATE 3 MILLILITER(S): .5; 2.5 SOLUTION RESPIRATORY (INHALATION) at 16:04

## 2025-05-16 RX ADMIN — Medication 5 MILLIGRAM(S): at 21:10

## 2025-05-16 RX ADMIN — Medication 100 MILLIGRAM(S): at 05:33

## 2025-05-16 RX ADMIN — QUETIAPINE FUMARATE 100 MILLIGRAM(S): 25 TABLET ORAL at 20:53

## 2025-05-16 RX ADMIN — CEFTRIAXONE 100 MILLIGRAM(S): 500 INJECTION, POWDER, FOR SOLUTION INTRAMUSCULAR; INTRAVENOUS at 06:50

## 2025-05-16 RX ADMIN — Medication 1000 UNIT(S): at 15:47

## 2025-05-16 RX ADMIN — Medication 250 MILLIGRAM(S): at 17:37

## 2025-05-16 RX ADMIN — Medication 250 MILLIGRAM(S): at 09:15

## 2025-05-16 RX ADMIN — Medication 250 MILLIGRAM(S): at 14:49

## 2025-05-16 RX ADMIN — AMLODIPINE BESYLATE 5 MILLIGRAM(S): 10 TABLET ORAL at 05:34

## 2025-05-16 RX ADMIN — Medication 100 MILLILITER(S): at 06:50

## 2025-05-16 RX ADMIN — Medication 100 MILLIGRAM(S): at 17:38

## 2025-05-16 NOTE — PROGRESS NOTE ADULT - PROBLEM SELECTOR PLAN 3
p/w 1 days of watery stool, nausea and vomiting  CTAP: Mild wall thickening of the cecum and ascending colon could reflect underdistention versus colitis.  -started Ceftriaxone and flagyl  -send for GI PCR, stool Cx, ova and parasites if more loose stool p/w 1 days of watery stool, nausea and vomiting  CTAP: Mild wall thickening of the cecum and ascending colon could reflect underdistention versus colitis.  -cont Ceftriaxone and flagyl  -send for GI PCR, stool Cx, ova and parasites if more loose stool

## 2025-05-16 NOTE — PHARMACOTHERAPY INTERVENTION NOTE - INTERVENTION TYPE MISC
other...

## 2025-05-16 NOTE — SWALLOW BEDSIDE ASSESSMENT ADULT - NS ASR SWALLOW FINDINGS DISCUS
Discussed results and recommendations with DAGO Azevedo & communicated results & recommendations to CELIA Esposito via TEAMS./Physician/Nursing

## 2025-05-16 NOTE — SWALLOW BEDSIDE ASSESSMENT ADULT - SWALLOW EVAL: DIAGNOSIS
Pt p/w s&s of oropharyngeal dysphagia c/b reduced oral grading, impaired bolus formation and mastication, slow A-P transport, multiple swallows, and delayed swallow trigger. Overt s&s of aspiration/penetration observed on PO trials of mildly thick liquids and minced & moist solids AEB wet, gurgly vocal quality post PO intake.

## 2025-05-16 NOTE — PROGRESS NOTE ADULT - PROBLEM SELECTOR PLAN 4
p/w lactate 4.1>2.9  did not meet sepsis criteria  ?2/2 dehydration  -c/w IVF   -monitor lactate resolved

## 2025-05-16 NOTE — SWALLOW BEDSIDE ASSESSMENT ADULT - PHARYNGEAL PHASE
Delayed pharyngeal swallow/Wet vocal quality post oral intake/Multiple swallows Delayed pharyngeal swallow Delayed pharyngeal swallow/Multiple swallows

## 2025-05-16 NOTE — PROGRESS NOTE ADULT - ASSESSMENT
84[facility], [facility], ambulates with walker, PMH of Dementia with behavioural issues, HTN, presented to ED with multiple episodes of loose bm with associated nausea and vomiting, and choking episode. Patient hypoxic to 90% on RA on arrival. Ct showing colitis and Bilateral groundglass nodular opacities with superimposed areas of nodular consolidation. Admitted for AHRF 2/2 aspiration pneumonia due to colitis.

## 2025-05-16 NOTE — SWALLOW BEDSIDE ASSESSMENT ADULT - SLP PERTINENT HISTORY OF CURRENT PROBLEM
84y/M, from [facility], ambulates with walker, PMH of Dementia with behavioural issues, HTN, presented to ED with multiple episodes of loose bm with associated nausea and vomiting, and choking episode. Patient hypoxic to 90% on RA on arrival. As per progress note, Ct showing colitis and Bilateral groundglass nodular opacities with superimposed areas of nodular consolidation. Admitted for AHRF 2/2 aspiration pneumonia due to colitis.

## 2025-05-16 NOTE — PROGRESS NOTE ADULT - SUBJECTIVE AND OBJECTIVE BOX
Patient is a 84y old  Male who presents with a chief complaint of AHRF 2/2 ?aspiration pneumonia and colitis (15 May 2025 10:03)      INTERVAL HPI/OVERNIGHT EVENTS: NO acute events         REVIEW OF SYSTEMS:  CONSTITUTIONAL: No fever, chills  ENMT:  No difficulty hearing, no change in vision  NECK: No pain or stiffness  RESPIRATORY: No cough, SOB  CARDIOVASCULAR: No chest pain, palpitations  GASTROINTESTINAL: No abdominal pain. No nausea, vomiting, or diarrhea  GENITOURINARY: No dysuria  NEUROLOGICAL: No HA  SKIN: No itching, burning, rashes, or lesions   LYMPH NODES: No enlarged glands  ENDOCRINE: No heat or cold intolerance; No hair loss  MUSCULOSKELETAL: No joint pain or swelling; No muscle, back, or extremity pain  PSYCHIATRIC: No depression, anxiety  HEME/LYMPH: No easy bruising, or bleeding gums    T(C): 36.7 (05-16-25 @ 05:10), Max: 37.2 (05-15-25 @ 13:25)  HR: 76 (05-16-25 @ 05:10) (76 - 94)  BP: 141/71 (05-16-25 @ 05:10) (139/42 - 165/80)  RR: 18 (05-16-25 @ 05:10) (18 - 19)  SpO2: 99% (05-16-25 @ 05:10) (95% - 99%)  Wt(kg): --Vital Signs Last 24 Hrs  T(C): 36.7 (16 May 2025 05:10), Max: 37.2 (15 May 2025 13:25)  T(F): 98.1 (16 May 2025 05:10), Max: 99 (15 May 2025 13:25)  HR: 76 (16 May 2025 05:10) (76 - 94)  BP: 141/71 (16 May 2025 05:10) (139/42 - 165/80)  BP(mean): 86 (16 May 2025 05:10) (86 - 86)  RR: 18 (16 May 2025 05:10) (18 - 19)  SpO2: 99% (16 May 2025 05:10) (95% - 99%)    Parameters below as of 16 May 2025 05:10  Patient On (Oxygen Delivery Method): nasal cannula  O2 Flow (L/min): 2      PHYSICAL EXAM:  GENERAL: NAD  EYES: clear conjunctiva; EOMI  ENMT: Moist mucous membranes  NECK: Supple, No JVD, Normal thyroid  CHEST/LUNG: Clear to auscultation bilaterally; No rales, rhonchi, wheezing, or rubs  HEART: S1, S2, Regular rate and rhythm  ABDOMEN: Soft, Nontender, Nondistended; Bowel sounds present  NEURO: Alert & Oriented X3  EXTREMITIES: No LE edema, no calf tenderness  LYMPH: No lymphadenopathy noted  SKIN: No rashes or lesions    Consultant(s) Notes Reviewed:  [x ] YES  [ ] NO  Care Discussed with Consultants/Other Providers [ x] YES  [ ] NO    LABS:                        12.3   11.37 )-----------( 158      ( 16 May 2025 05:26 )             37.6     05-16    142  |  110[H]  |  21[H]  ----------------------------<  103[H]  4.5   |  26  |  1.13    Ca    8.1[L]      16 May 2025 05:26  Phos  2.9     05-16  Mg     2.1     05-16    TPro  5.9[L]  /  Alb  2.6[L]  /  TBili  0.5  /  DBili  x   /  AST  26  /  ALT  19  /  AlkPhos  50  05-16      CAPILLARY BLOOD GLUCOSE      POCT Blood Glucose.: 141 mg/dL (15 May 2025 10:43)        Urinalysis Basic - ( 16 May 2025 05:26 )    Color: x / Appearance: x / SG: x / pH: x  Gluc: 103 mg/dL / Ketone: x  / Bili: x / Urobili: x   Blood: x / Protein: x / Nitrite: x   Leuk Esterase: x / RBC: x / WBC x   Sq Epi: x / Non Sq Epi: x / Bacteria: x        RADIOLOGY & ADDITIONAL TESTS:    Imaging Personally Reviewed:  [ ] YES  [ ] NO     Patient is a 84y old  Male who presents with a chief complaint of AHRF 2/2 ?aspiration pneumonia and colitis (15 May 2025 10:03)      INTERVAL HPI/OVERNIGHT EVENTS: NO acute events, passed beside dysphagia screening. will f/u official speech and swallow         REVIEW OF SYSTEMS:  CONSTITUTIONAL: No fever, chills  ENMT:  No difficulty hearing, no change in vision  NECK: No pain or stiffness  RESPIRATORY: No cough, SOB  CARDIOVASCULAR: No chest pain, palpitations  GASTROINTESTINAL: No abdominal pain. No nausea, vomiting, or diarrhea  GENITOURINARY: No dysuria  NEUROLOGICAL: No HA  SKIN: No itching, burning, rashes, or lesions   LYMPH NODES: No enlarged glands  ENDOCRINE: No heat or cold intolerance; No hair loss  MUSCULOSKELETAL: No joint pain or swelling; No muscle, back, or extremity pain  PSYCHIATRIC: No depression, anxiety  HEME/LYMPH: No easy bruising, or bleeding gums    T(C): 36.7 (05-16-25 @ 05:10), Max: 37.2 (05-15-25 @ 13:25)  HR: 76 (05-16-25 @ 05:10) (76 - 94)  BP: 141/71 (05-16-25 @ 05:10) (139/42 - 165/80)  RR: 18 (05-16-25 @ 05:10) (18 - 19)  SpO2: 99% (05-16-25 @ 05:10) (95% - 99%)  Wt(kg): --Vital Signs Last 24 Hrs  T(C): 36.7 (16 May 2025 05:10), Max: 37.2 (15 May 2025 13:25)  T(F): 98.1 (16 May 2025 05:10), Max: 99 (15 May 2025 13:25)  HR: 76 (16 May 2025 05:10) (76 - 94)  BP: 141/71 (16 May 2025 05:10) (139/42 - 165/80)  BP(mean): 86 (16 May 2025 05:10) (86 - 86)  RR: 18 (16 May 2025 05:10) (18 - 19)  SpO2: 99% (16 May 2025 05:10) (95% - 99%)    Parameters below as of 16 May 2025 05:10  Patient On (Oxygen Delivery Method): nasal cannula  O2 Flow (L/min): 2    MEDICATIONS  (STANDING):  albuterol/ipratropium for Nebulization. 3 milliLiter(s) Nebulizer once  amLODIPine   Tablet 5 milliGRAM(s) Oral daily  cefTRIAXone   IVPB 1000 milliGRAM(s) IV Intermittent every 24 hours  cholecalciferol 1000 Unit(s) Oral daily  donepezil 10 milliGRAM(s) Oral at bedtime  heparin   Injectable 5000 Unit(s) SubCutaneous every 8 hours  melatonin 5 milliGRAM(s) Oral at bedtime  metroNIDAZOLE  IVPB      metroNIDAZOLE  IVPB 500 milliGRAM(s) IV Intermittent every 12 hours  QUEtiapine 100 milliGRAM(s) Oral <User Schedule>  sodium chloride 0.9%. 1000 milliLiter(s) (100 mL/Hr) IV Continuous <Continuous>  valproic  acid Syrup 250 milliGRAM(s) Oral <User Schedule>    MEDICATIONS  (PRN):  ondansetron Injectable 4 milliGRAM(s) IV Push every 8 hours PRN Nausea and/or Vomiting    PHYSICAL EXAM:  GENERAL: NAD  EYES: clear conjunctiva; EOMI  ENMT: Moist mucous membranes  NECK: Supple, No JVD, Normal thyroid  CHEST/LUNG: Crackles at bases on auscultation bilaterally  HEART: S1, S2, Regular rate and rhythm  ABDOMEN: Soft, Nontender, Nondistended; Bowel sounds present  NEURO: Alert & awake   EXTREMITIES: No LE edema, no calf tenderness  LYMPH: No lymphadenopathy noted  SKIN: No rashes or lesions    Consultant(s) Notes Reviewed:  [x ] YES  [ ] NO  Care Discussed with Consultants/Other Providers [ x] YES  [ ] NO    LABS:                        12.3   11.37 )-----------( 158      ( 16 May 2025 05:26 )             37.6     05-16    142  |  110[H]  |  21[H]  ----------------------------<  103[H]  4.5   |  26  |  1.13    Ca    8.1[L]      16 May 2025 05:26  Phos  2.9     05-16  Mg     2.1     05-16    TPro  5.9[L]  /  Alb  2.6[L]  /  TBili  0.5  /  DBili  x   /  AST  26  /  ALT  19  /  AlkPhos  50  05-16      CAPILLARY BLOOD GLUCOSE      POCT Blood Glucose.: 141 mg/dL (15 May 2025 10:43)    Urinalysis Basic - ( 16 May 2025 05:26 )    Color: x / Appearance: x / SG: x / pH: x  Gluc: 103 mg/dL / Ketone: x  / Bili: x / Urobili: x   Blood: x / Protein: x / Nitrite: x   Leuk Esterase: x / RBC: x / WBC x   Sq Epi: x / Non Sq Epi: x / Bacteria: x        RADIOLOGY & ADDITIONAL TESTS:    Imaging Personally Reviewed:  [ x] YES  [ ] NO    < from: CT Abdomen and Pelvis No Cont (05.15.25 @ 06:00) >    ACC: [id number] EXAM:  CT ABDOMEN AND PELVIS   ORDERED BY: WENDI SHANNON     ACC: [id number] EXAM:  CT CHEST   ORDERED BY: WENDI SHANNON     PROCEDURE DATE:  05/15/2025          INTERPRETATION:  CLINICAL INFORMATION: Hypoxia, nausea and diarrhea.    COMPARISON: None.    CONTRAST/COMPLICATIONS:  IV Contrast: None  Oral Contrast: None      PROCEDURE:  CT of the Chest, Abdomen and Pelvis was performed.  Sagittal and coronal reformats were performed.    FINDINGS:  Evaluation of solid organs and vascular structures is limited without   intravenous contrast. Exam is degraded by motion artifact.    CHEST:  LUNGS AND LARGE AIRWAYS: Evaluation degraded by motion artifact. Trace   secretions in the trachea. Bilateral groundglass nodular opacities with   superimposed areas of nodular consolidation. Mild bibasilar atelectasis.  PLEURA: No pleural effusion.  VESSELS: Aortic calcifications.  HEART: Heart size is normal. Trace pericardial effusion.  MEDIASTINUM AND LAY: No lymphadenopathy.  CHEST WALL AND LOWER NECK: 3 mm hypodense right thyroid nodule.    ABDOMEN AND PELVIS:  LIVER: Within normal limits.  BILE DUCTS: Normal caliber.  GALLBLADDER: Limited evaluation due to motion artifact.  SPLEEN: Grossly within normal limits. Limited evaluationof the superior   aspect due to motion artifact.  PANCREAS: Within normal limits.  ADRENALS: Within normal limits.  KIDNEYS/URETERS: No renal stones or hydronephrosis.    BLADDER: Mild wall thickening but underdistended.  REPRODUCTIVE ORGANS: Enlarged prostate.    BOWEL: No bowel obstruction. Appendix is normal. Mild wall thickening of   the cecum and ascending colon.  PERITONEUM/RETROPERITONEUM: Within normal limits.  VESSELS: Atherosclerotic changes.  LYMPH NODES: No lymphadenopathy.  ABDOMINALWALL: Small fat-containing umbilical hernia. Small bilateral   fat-containing inguinal hernias.  BONES: Degenerative changes.    IMPRESSION:  Exam limited by noncontrast technique and motion artifact.    Bilateral groundglass nodular opacities with superimposed areas of   nodular consolidation, likely infectious in etiology. Follow-up CT after   treatment is recommended to ensure resolution.    Mild wall thickening of the cecum and ascending colon could reflect   underdistention versus colitis. Clinical correlation recommended.      --- End of Report ---            RISHABH STACK MD; Attending Radiologist  This document has been electronically signed. May 15 2025  6:17AM    < end of copied text >

## 2025-05-16 NOTE — SWALLOW BEDSIDE ASSESSMENT ADULT - COMMENTS
Overt s&s of aspiration/penetration observed on PO trials of mildly thick liquids AEB wet, gurgly vocal quality post PO intake. HOB elevated to 90°; AA+Ox1, pt inconsistently followed directives. Pt's eyes remained closed for majority of the exam; however, consistently responded to queries and verbal cues. PO trials included: ice, puree, minced & moist, mildly thick liquids, & moderately thick liquids. Overt s&s of aspiration/penetration observed on PO trials of minced & moist solids AEB wet, gurgly vocal quality post PO intake.

## 2025-05-16 NOTE — PROGRESS NOTE ADULT - PROBLEM SELECTOR PLAN 7
has h/o dementia with behavioural issues on donepezil, Depakote and Seroquel at home  c/w home meds  may give Zyprexa 2.5mg im prn for severe agitation  monitor qtc

## 2025-05-16 NOTE — PROGRESS NOTE ADULT - PROBLEM SELECTOR PLAN 1
p/w sob, cough and a choking spell  CT Chest shows Trace secretions in the trachea. Bilateral groundglass nodular opacities with superimposed areas of nodular consolidation. Mild bibasilar atelectasis.  - 2/2 aspiration pneumonia  - f/u strep ag, legionella, procalcitonin, mycoplasma igm  -aspiration precaution  -f/u Speech and swallow eval  -O2 supplementation, wean off as tolerated- weaned to 2L today p/w sob, cough and a choking spell  CT Chest shows Trace secretions in the trachea. Bilateral groundglass nodular opacities with superimposed areas of nodular consolidation. Mild bibasilar atelectasis.  - 2/2 aspiration pneumonia  - f/u strep ag, legionella, procalcitonin, mycoplasma igm  -aspiration precaution  -f/u Speech and swallow eval  -O2 supplementation, wean off as tolerated- weaned to 2L today- saturating well

## 2025-05-16 NOTE — SWALLOW BEDSIDE ASSESSMENT ADULT - SWALLOW EVAL: CURRENT DIET
NPO, with non-oral nutrition/hydration/medications; recently upgraded to Minced & Moist solids w/ mildly thick liquids

## 2025-05-16 NOTE — SWALLOW BEDSIDE ASSESSMENT ADULT - MODE OF PRESENTATION
x3 tspn; x2 cup sip/cup/spoon/fed by clinician x3 tspn/spoon/fed by clinician x2 tspn; x2 cup sip/cup/spoon/fed by clinician x1 ice chip/spoon/fed by clinician

## 2025-05-17 LAB
ANION GAP SERPL CALC-SCNC: 10 MMOL/L — SIGNIFICANT CHANGE UP (ref 5–17)
BUN SERPL-MCNC: 19 MG/DL — HIGH (ref 7–18)
CALCIUM SERPL-MCNC: 8.5 MG/DL — SIGNIFICANT CHANGE UP (ref 8.4–10.5)
CHLORIDE SERPL-SCNC: 108 MMOL/L — SIGNIFICANT CHANGE UP (ref 96–108)
CO2 SERPL-SCNC: 22 MMOL/L — SIGNIFICANT CHANGE UP (ref 22–31)
CREAT SERPL-MCNC: 1.01 MG/DL — SIGNIFICANT CHANGE UP (ref 0.5–1.3)
EGFR: 74 ML/MIN/1.73M2 — SIGNIFICANT CHANGE UP
EGFR: 74 ML/MIN/1.73M2 — SIGNIFICANT CHANGE UP
GLUCOSE SERPL-MCNC: 104 MG/DL — HIGH (ref 70–99)
HCT VFR BLD CALC: 39 % — SIGNIFICANT CHANGE UP (ref 39–50)
HGB BLD-MCNC: 12.7 G/DL — LOW (ref 13–17)
M PNEUMO IGM SER-ACNC: 1.69 INDEX — HIGH (ref 0–0.9)
MCHC RBC-ENTMCNC: 30 PG — SIGNIFICANT CHANGE UP (ref 27–34)
MCHC RBC-ENTMCNC: 32.6 G/DL — SIGNIFICANT CHANGE UP (ref 32–36)
MCV RBC AUTO: 92.2 FL — SIGNIFICANT CHANGE UP (ref 80–100)
MYCOPLASMA AG SPEC QL: POSITIVE
NRBC BLD AUTO-RTO: 0 /100 WBCS — SIGNIFICANT CHANGE UP (ref 0–0)
PLATELET # BLD AUTO: 115 K/UL — LOW (ref 150–400)
POTASSIUM SERPL-MCNC: 3.9 MMOL/L — SIGNIFICANT CHANGE UP (ref 3.5–5.3)
POTASSIUM SERPL-SCNC: 3.9 MMOL/L — SIGNIFICANT CHANGE UP (ref 3.5–5.3)
RBC # BLD: 4.23 M/UL — SIGNIFICANT CHANGE UP (ref 4.2–5.8)
RBC # FLD: 14.1 % — SIGNIFICANT CHANGE UP (ref 10.3–14.5)
SODIUM SERPL-SCNC: 140 MMOL/L — SIGNIFICANT CHANGE UP (ref 135–145)
WBC # BLD: 12.18 K/UL — HIGH (ref 3.8–10.5)
WBC # FLD AUTO: 12.18 K/UL — HIGH (ref 3.8–10.5)

## 2025-05-17 PROCEDURE — 99233 SBSQ HOSP IP/OBS HIGH 50: CPT

## 2025-05-17 PROCEDURE — 99222 1ST HOSP IP/OBS MODERATE 55: CPT

## 2025-05-17 RX ORDER — IPRATROPIUM BROMIDE AND ALBUTEROL SULFATE .5; 2.5 MG/3ML; MG/3ML
3 SOLUTION RESPIRATORY (INHALATION) EVERY 6 HOURS
Refills: 0 | Status: DISCONTINUED | OUTPATIENT
Start: 2025-05-17 | End: 2025-05-17

## 2025-05-17 RX ORDER — MEMANTINE HYDROCHLORIDE 21 MG/1
5 CAPSULE, EXTENDED RELEASE ORAL DAILY
Refills: 0 | Status: DISCONTINUED | OUTPATIENT
Start: 2025-05-17 | End: 2025-05-23

## 2025-05-17 RX ORDER — IPRATROPIUM BROMIDE AND ALBUTEROL SULFATE .5; 2.5 MG/3ML; MG/3ML
3 SOLUTION RESPIRATORY (INHALATION) EVERY 6 HOURS
Refills: 0 | Status: COMPLETED | OUTPATIENT
Start: 2025-05-17 | End: 2025-05-19

## 2025-05-17 RX ADMIN — HEPARIN SODIUM 5000 UNIT(S): 1000 INJECTION INTRAVENOUS; SUBCUTANEOUS at 21:10

## 2025-05-17 RX ADMIN — AMLODIPINE BESYLATE 5 MILLIGRAM(S): 10 TABLET ORAL at 05:19

## 2025-05-17 RX ADMIN — MEMANTINE HYDROCHLORIDE 5 MILLIGRAM(S): 21 CAPSULE, EXTENDED RELEASE ORAL at 17:47

## 2025-05-17 RX ADMIN — QUETIAPINE FUMARATE 100 MILLIGRAM(S): 25 TABLET ORAL at 20:53

## 2025-05-17 RX ADMIN — HEPARIN SODIUM 5000 UNIT(S): 1000 INJECTION INTRAVENOUS; SUBCUTANEOUS at 13:14

## 2025-05-17 RX ADMIN — Medication 1000 UNIT(S): at 12:07

## 2025-05-17 RX ADMIN — IPRATROPIUM BROMIDE AND ALBUTEROL SULFATE 3 MILLILITER(S): .5; 2.5 SOLUTION RESPIRATORY (INHALATION) at 20:02

## 2025-05-17 RX ADMIN — Medication 100 MILLIGRAM(S): at 05:19

## 2025-05-17 RX ADMIN — Medication 250 MILLIGRAM(S): at 08:13

## 2025-05-17 RX ADMIN — Medication 250 MILLIGRAM(S): at 13:12

## 2025-05-17 RX ADMIN — Medication 5 MILLIGRAM(S): at 21:09

## 2025-05-17 RX ADMIN — DONEPEZIL HYDROCHLORIDE 10 MILLIGRAM(S): 5 TABLET ORAL at 21:09

## 2025-05-17 RX ADMIN — Medication 100 MILLIGRAM(S): at 17:46

## 2025-05-17 RX ADMIN — CEFTRIAXONE 100 MILLIGRAM(S): 500 INJECTION, POWDER, FOR SOLUTION INTRAMUSCULAR; INTRAVENOUS at 05:19

## 2025-05-17 RX ADMIN — Medication 250 MILLIGRAM(S): at 17:47

## 2025-05-17 RX ADMIN — HEPARIN SODIUM 5000 UNIT(S): 1000 INJECTION INTRAVENOUS; SUBCUTANEOUS at 05:19

## 2025-05-17 RX ADMIN — IPRATROPIUM BROMIDE AND ALBUTEROL SULFATE 3 MILLILITER(S): .5; 2.5 SOLUTION RESPIRATORY (INHALATION) at 18:21

## 2025-05-17 NOTE — CONSULT NOTE ADULT - SUBJECTIVE AND OBJECTIVE BOX
Consult to: Discuss complex medical decision making related to goals of care    [facility] Geriatric and Palliative Consult Service:  Twila Stephenson DO: cell ([phone])  Ryan Maria MD: cell ([phone])  Lloyd Ko NP: cell ([phone])   Jessica Fleischer-Black MD:  cell ([phone])  Manpreet Landaverde LMSW: cell ([phone])     Can contact any Palliative Team member via Microsoft Teams for consults and questions      HPI:  84y/M, from [facility] assisted living, ambulates with walker, PMH of Dementia with behavioural issues, HTN, presented to ED with multiple episodes of loose bm since yesterday with associated nausea and vomiting, As per HHA by bedside, patient had acute onset watery bowel movement, multiple episodes, no blood in stool. He had an episode of nausea and vomiting, patient had a choking episode too. Denies any fever and chills, chest pain, SOB, palpitation.   Patient was admitted from 04/11-04/15 to [facility] for agitation. As per HHA, there has been no improvement in his agitation, he sundowns.  (15 May 2025 10:03)      PAST MEDICAL & SURGICAL HISTORY:  Dementia      Hypertension      Xerotic eczema      Cataract      S/P cataract surgery          SOCIAL HISTORY:    Admitted from:  home  (with HHA)           assisted living          [facility]   [ none ] Substance abuse, [ none ] Tobacco hx, [ none ] Alcohol hx, [ none ] Home Opioid Hx    FAMILY HISTORY:   unable to obtain from patient due to poor mentation, family unable to give information, see H&P for history  Baseline ADLs (prior to admission):    Anabaptist:  Jain    Allergies    No Known Allergies    Intolerances      Present Symptoms: Mild, Moderate, Severe  Pain:             Location -                               Aggravating factors -             Quality -             Radiation -             Timing-             Severity (0-10 scale):             Minimal acceptable level (0-10 scale):  Fatigue:  denies  Nausea:  denies  Lack of Appetite:  denies  SOB: denies  Depression:  denies  Anxiety:  denies  Constipation:  denies     Review of Systems:  All other systems reviewed and are negative OR Unable to obtain due to poor mentation      CPOT:    https://ccs-st.org/resources/Documents/Sedation%20Analgesia%20Delirium%20in%20CC/CCS%20STH%20Guideline%20template%20CPOT.pdf  PAIN AD Score:   [email or web address]/cog/[email or web address].pdf (press ctrl +  left click to view)      MEDICATIONS  (STANDING):  albuterol/ipratropium for Nebulization 3 milliLiter(s) Nebulizer every 6 hours  amLODIPine   Tablet 5 milliGRAM(s) Oral daily  cefTRIAXone   IVPB 1000 milliGRAM(s) IV Intermittent every 24 hours  cholecalciferol 1000 Unit(s) Oral daily  donepezil 10 milliGRAM(s) Oral at bedtime  heparin   Injectable 5000 Unit(s) SubCutaneous every 8 hours  melatonin 5 milliGRAM(s) Oral at bedtime  memantine 5 milliGRAM(s) Oral daily  metroNIDAZOLE  IVPB      metroNIDAZOLE  IVPB 500 milliGRAM(s) IV Intermittent every 12 hours  QUEtiapine 100 milliGRAM(s) Oral <User Schedule>  sodium chloride 0.9%. 1000 milliLiter(s) (100 mL/Hr) IV Continuous <Continuous>  valproic  acid Syrup 250 milliGRAM(s) Oral <User Schedule>    MEDICATIONS  (PRN):  ondansetron Injectable 4 milliGRAM(s) IV Push every 8 hours PRN Nausea and/or Vomiting      PHYSICAL EXAM:  Vital Signs Last 24 Hrs  T(C): 37.2 (17 May 2025 20:03), Max: 37.2 (17 May 2025 20:03)  T(F): 98.9 (17 May 2025 20:03), Max: 98.9 (17 May 2025 20:03)  HR: 91 (17 May 2025 22:05) (85 - 108)  BP: 154/89 (17 May 2025 20:03) (141/101 - 168/95)  BP(mean): 105 (17 May 2025 20:03) (89 - 119)  RR: 22 (17 May 2025 20:03) (16 - 22)  SpO2: 97% (17 May 2025 22:05) (90% - 100%)    Parameters below as of 17 May 2025 22:05  Patient On (Oxygen Delivery Method): nasal cannula, 1.5        General: alert  oriented x ____    lethargic distressed cachexia  nonverbal  unarousable verbal    Palliative Performance Scale/Karnofsky Score:  http://npcrc.org/files/news/palliative_performance_scale_ppsv2.pdf    HEENT:  EOMI anicteric, pharynx clear, MM moist  Lungs: tachypnea/labored breathing, clear to auscultation, no congestion noted  CV: RRR, tachycardic, normal S1 and S2, no murmur  GI: soft non distended non tender   + normal bowel sounds  : incontinent  oliguria/anuria  cunningham  Musculoskeletal: weakness x4  in all extremities, ambulatory with assistance   mostly/fully bedbound/wheelchair bound, no edema noted  Skin: no abnormal skin lesions or DU noted, poor skin turgor  Neuro: no deficits, mild cognitive impairment dsyphagia/dysarthria paresis  Oral intake ability: unable/only mouth care, minimal moderate full capability    LABS:                        12.7   12.18 )-----------( 115      ( 17 May 2025 05:35 )             39.0     05-17    140  |  108  |  19[H]  ----------------------------<  104[H]  3.9   |  22  |  1.01    Ca    8.5      17 May 2025 05:35  Phos  2.9     05-16  Mg     2.1     05-16    TPro  5.9[L]  /  Alb  2.6[L]  /  TBili  0.5  /  DBili  x   /  AST  26  /  ALT  19  /  AlkPhos  50  05-16    Urinalysis Basic - ( 17 May 2025 05:35 )    Color: x / Appearance: x / SG: x / pH: x  Gluc: 104 mg/dL / Ketone: x  / Bili: x / Urobili: x   Blood: x / Protein: x / Nitrite: x   Leuk Esterase: x / RBC: x / WBC x   Sq Epi: x / Non Sq Epi: x / Bacteria: x        RADIOLOGY & ADDITIONAL STUDIES:     Consult to: Discuss complex medical decision making related to goals of care    [facility] Geriatric and Palliative Consult Service:  Twila Stephenson DO: cell ([phone])  Ryan Maria MD: cell ([phone])  Lloyd Ko NP: cell ([phone])   Jessica Fleischer-Black MD:  cell ([phone])  Manpreet Landaverde SW: cell ([phone])     Can contact any Palliative Team member via Microsoft Teams for consults and questions      HPI:  84[facility] ambulates with walker, PMHx of Dementia with behavioural issues, HTN, presented to ED with multiple episodes of loose bm since yesterday with associated nausea and vomiting, As per HHA by bedside, patient had acute onset watery bowel movement, multiple episodes, no blood in stool. He had an episode of nausea and vomiting, patient had a choking episode too. Denies any fever and chills, chest pain, SOB, palpitation.   Patient was admitted from 04/11-04/15 to [facility] for agitation. As per HHA, there has been no improvement in his agitation, he sundowns.  (15 May 2025 10:03)    In ER, CXR with nonspecific opacities noted left mid and lower lung zone. CT chest/abd/pelvis showed bilateral groundglass nodular opacities with superimposed areas of nodular consolidation, likely infectious in etiology, and mild wall thickening of the cecum and ascending colon which could reflect underdistention versus colitis.  Patient was admitted for AHRF 2/2 to aspiration pneumonia due to colitis, FOSTER, and lactic acidosis in the setting of advanced dementia.  Started on IV ceftriaxone and Flagyl, and IV fluids.  Blood cultures have remained NGTD.  Evaluated by SLP on 5/16, found to have signs/symptoms of oropharyngeal dysphagia, with signs/sx of overt aspiration and penetration observed on PO trials of mildly thick liquids and minced & moist solids AEB wet, gurgly vocal quality; recommended for pureed diet with moderately thickened liquids (was not on modified diet at [facility]).  Palliative Care consultation requested for complex medical decision making and additional GOC discussion.    Old chart briefly reviewed--patient with 3 prior admissions to [facility] since Feb 2024, most recently from 4/11 to 4/15/25; during at least one of these admissions, inpatient leo psych admission was considered, but patient's behavior improved and he ultimately was discharged back to [facility] setting.      Patient examined this afternoon with private HHA Nessa at bedside.  Alert and oriented x 1, able to respond to simple questions.  Denies pain currently, still with mild SOB and cough (+ audibly wheezing).  Appears anxious but otherwise calm.  Denies nausea or vomiting.  No other acute complaints.       PAST MEDICAL & SURGICAL HISTORY:  Dementia      Hypertension      Xerotic eczema      Cataract      S/P cataract surgery          SOCIAL HISTORY:    Admitted from:  [facility]   (assisted living)         has private live-in 24 hr HHA  [ none ] Substance abuse, [ none ] Tobacco hx, [ none ] Alcohol hx, [ none ] Home Opioid Hx    FAMILY HISTORY:  Unable to obtain from patient due to poor mentation, family unable to give information, see H&P for history  Baseline ADLs (prior to admission):  Ambulatory with walker, still continent, requires moderate assistance with ADLs (bathing/dressing).  Still goes down to dining room for meals, participates in group activities w/assistance of HHA.    Anabaptism:  [employer]oration    Allergies    No Known Allergies    Intolerances      Present Symptoms: Mild, Moderate, Severe  Pain:  Denies presently  Fatigue:  denies  Nausea:  denies  SOB:  mild  Depression:  denies  Anxiety:  mild   Constipation:  denies     Review of Systems:  All other systems reviewed and are negative       MEDICATIONS  (STANDING):  albuterol/ipratropium for Nebulization 3 milliLiter(s) Nebulizer every 6 hours  amLODIPine   Tablet 5 milliGRAM(s) Oral daily  cefTRIAXone   IVPB 1000 milliGRAM(s) IV Intermittent every 24 hours  cholecalciferol 1000 Unit(s) Oral daily  donepezil 10 milliGRAM(s) Oral at bedtime  heparin   Injectable 5000 Unit(s) SubCutaneous every 8 hours  melatonin 5 milliGRAM(s) Oral at bedtime  memantine 5 milliGRAM(s) Oral daily  metroNIDAZOLE  IVPB      metroNIDAZOLE  IVPB 500 milliGRAM(s) IV Intermittent every 12 hours  QUEtiapine 100 milliGRAM(s) Oral <User Schedule>  sodium chloride 0.9%. 1000 milliLiter(s) (100 mL/Hr) IV Continuous <Continuous>  valproic  acid Syrup 250 milliGRAM(s) Oral <User Schedule>    MEDICATIONS  (PRN):  ondansetron Injectable 4 milliGRAM(s) IV Push every 8 hours PRN Nausea and/or Vomiting      PHYSICAL EXAM:  Vital Signs Last 24 Hrs  T(C): 37.2 (17 May 2025 20:03), Max: 37.2 (17 May 2025 20:03)  T(F): 98.9 (17 May 2025 20:03), Max: 98.9 (17 May 2025 20:03)  HR: 91 (17 May 2025 22:05) (85 - 108)  BP: 154/89 (17 May 2025 20:03) (141/101 - 168/95)  BP(mean): 105 (17 May 2025 20:03) (89 - 119)  RR: 22 (17 May 2025 20:03) (16 - 22)  SpO2: 97% (17 May 2025 22:05) (90% - 100%)    Parameters below as of 17 May 2025 22:05  Patient On (Oxygen Delivery Method): nasal cannula, 1.5        General: alert  oriented x __1__    calm but anxious, + mild temporal wasting, in NAD    Palliative Performance Scale/Karnofsky Score:  40%  http://npcrc.org/files/news/palliative_performance_scale_ppsv2.pdf    HEENT:  EOMI anicteric, pharynx clear, MM moist  Lungs: mildly tachypneic with transmitted wheezing/upper airway congestion throughout, respirations unlabored  CV: RRR, normal S1 and S2, no murmur  GI: soft non distended non tender   + normal bowel sounds  : urinating  Musculoskeletal: weakness x4  in all extremities, ambulatory with walker, no edema noted  Skin: no abnormal skin lesions or DU noted, + decreased skin turgor  Neuro: no focal deficits, + severe cognitive impairment   + dysphagia  Oral intake ability:  moderate  capability, on pureed diet with moderately thickened liquids      LABS:                        12.7   12.18 )-----------( 115      ( 17 May 2025 05:35 )             39.0     05-17    140  |  108  |  19[H]  ----------------------------<  104[H]  3.9   |  22  |  1.01    Ca    8.5      17 May 2025 05:35  Phos  2.9     05-16  Mg     2.1     05-16    TPro  5.9[L]  /  Alb  2.6[L]  /  TBili  0.5  /  DBili  x   /  AST  26  /  ALT  19  /  AlkPhos  50  05-16    Urinalysis Basic - ( 17 May 2025 05:35 )    Color: x / Appearance: x / SG: x / pH: x  Gluc: 104 mg/dL / Ketone: x  / Bili: x / Urobili: x   Blood: x / Protein: x / Nitrite: x   Leuk Esterase: x / RBC: x / WBC x   Sq Epi: x / Non Sq Epi: x / Bacteria: x        RADIOLOGY & ADDITIONAL STUDIES:      ACC: [id number] EXAM:  XR CHEST PORTABLE URGENT 1V   ORDERED BY: WENDI SHANNON     PROCEDURE DATE:  05/15/2025          INTERPRETATION:  CLINICAL STATEMENT: Chest pain.    TECHNIQUE: AP view of the chest.    COMPARISON: None available.    FINDINGS/  IMPRESSION:  Nonspecific opacities noted left mid and lower lung zone. Follow-up   recommended.    No pleural effusion.    Heart size within normal limits.    --- End of Report ---      ACC: [id number] EXAM:  CT BRAIN   ORDERED BY: WENDI SHANNON     PROCEDURE DATE:  05/15/2025          INTERPRETATION:  CLINICAL INFORMATION: ams    COMPARISON: None.    CONTRAST:  IV Contrast: None.    TECHNIQUE:  Serial axial images were obtained from the skull base to the   vertex using multi-slice helical technique. Sagittal and coronal   reformats were obtained.    FINDINGS:    VENTRICLES AND SULCI: Age appropriate involutional changes.  INTRA-AXIAL: No mass effect, acute hemorrhage, or midline shift.  There   are periventricular and subcortical white matter hypodensities,   consistent with microvascular type changes. No acute vascular territory   infarct.  EXTRA-AXIAL: No mass or fluid collection. Basal cisterns are normal in   appearance.    VISUALIZED SINUSES:  Clear.  TYMPANOMASTOID CAVITIES:  Clear.  VISUALIZED ORBITS: Bilateral lens replacement.  CALVARIUM: Intact.    MISCELLANEOUS: None.      IMPRESSION:  No acute intracranial pathology.    --- End of Report ---      ACC: [id number] EXAM:  CT ABDOMEN AND PELVIS   ORDERED BY: WENDI SHANNON     ACC: [id number] EXAM:  CT CHEST   ORDERED BY: WENDI SHANNON     PROCEDURE DATE:  05/15/2025          INTERPRETATION:  CLINICAL INFORMATION: Hypoxia, nausea and diarrhea.    COMPARISON: None.    CONTRAST/COMPLICATIONS:  IV Contrast: None  Oral Contrast: None      PROCEDURE:  CT of the Chest, Abdomen and Pelvis was performed.  Sagittal and coronal reformats were performed.    FINDINGS:  Evaluation of solid organs and vascular structures is limited without   intravenous contrast. Exam is degraded by motion artifact.    CHEST:  LUNGS AND LARGE AIRWAYS: Evaluation degraded by motion artifact. Trace   secretions in the trachea. Bilateral groundglass nodular opacities with   superimposed areas of nodular consolidation. Mild bibasilar atelectasis.  PLEURA: No pleural effusion.  VESSELS: Aortic calcifications.  HEART: Heart size is normal. Trace pericardial effusion.  MEDIASTINUM AND LAY: No lymphadenopathy.  CHEST WALL AND LOWER NECK: 3 mm hypodense right thyroid nodule.    ABDOMEN AND PELVIS:  LIVER: Within normal limits.  BILE DUCTS: Normal caliber.  GALLBLADDER: Limited evaluation due to motion artifact.  SPLEEN: Grossly within normal limits. Limited evaluationof the superior   aspect due to motion artifact.  PANCREAS: Within normal limits.  ADRENALS: Within normal limits.  KIDNEYS/URETERS: No renal stones or hydronephrosis.    BLADDER: Mild wall thickening but underdistended.  REPRODUCTIVE ORGANS: Enlarged prostate.    BOWEL: No bowel obstruction. Appendix is normal. Mild wall thickening of   the cecum and ascending colon.  PERITONEUM/RETROPERITONEUM: Within normal limits.  VESSELS: Atherosclerotic changes.  LYMPH NODES: No lymphadenopathy.  ABDOMINALWALL: Small fat-containing umbilical hernia. Small bilateral   fat-containing inguinal hernias.  BONES: Degenerative changes.    IMPRESSION:  Exam limited by noncontrast technique and motion artifact.    Bilateral groundglass nodular opacities with superimposed areas of   nodular consolidation, likely infectious in etiology. Follow-up CT after   treatment is recommended to ensure resolution.    Mild wall thickening of the cecum and ascending colon could reflect   underdistention versus colitis. Clinical correlation recommended.      --- End of Report ---     Consult to: Discuss complex medical decision making related to goals of care    [facility] Geriatric and Palliative Consult Service:  Twila Stephenson DO: cell ([phone])  Ryan Maria MD: cell ([phone])  Lloyd Ko NP: cell ([phone])   Jessica Fleischer-Black MD:  cell ([phone])  Manpreet Landaverde SW: cell ([phone])     Can contact any Palliative Team member via Microsoft Teams for consults and questions      HPI:  84[facility] ambulates with walker, PMHx of Dementia with behavioural issues, HTN, presented to ED with multiple episodes of loose bm since yesterday with associated nausea and vomiting, As per HHA by bedside, patient had acute onset watery bowel movement, multiple episodes, no blood in stool. He had an episode of nausea and vomiting, patient had a choking episode too. Denies any fever and chills, chest pain, SOB, palpitation.   Patient was admitted from 04/11-04/15 to [facility] for agitation. As per HHA, there has been no improvement in his agitation, he sundowns.  (15 May 2025 10:03)    In ER, CXR with nonspecific opacities noted left mid and lower lung zone. CT chest/abd/pelvis showed bilateral groundglass nodular opacities with superimposed areas of nodular consolidation, likely infectious in etiology, and mild wall thickening of the cecum and ascending colon which could reflect underdistention versus colitis.  Patient was admitted for AHRF 2/2 to aspiration pneumonia due to colitis, FOSTER, and lactic acidosis in the setting of advanced dementia.  Started on IV ceftriaxone and Flagyl, and IV fluids.  Blood cultures have remained NGTD.  Evaluated by SLP on 5/16, found to have signs/symptoms of oropharyngeal dysphagia, with signs/sx of overt aspiration and penetration observed on PO trials of mildly thick liquids and minced & moist solids AEB wet, gurgly vocal quality; recommended for pureed diet with moderately thickened liquids (was not on modified diet at [facility]).  Palliative Care consultation requested for complex medical decision making and additional GOC discussion.    Old chart briefly reviewed--patient with 3 prior admissions to [facility] since Feb 2024, most recently from 4/11 to 4/15/25; during at least one of these admissions, inpatient leo psych admission was considered, but patient's behavior improved and he ultimately was discharged back to [facility] setting.      Patient examined this afternoon with private HHA Nessa at bedside.  Alert and oriented x 1, able to respond to simple questions.  Denies pain currently, still with mild SOB and cough (+ audibly wheezing).  Appears anxious but otherwise calm.  Denies nausea or vomiting.  No other acute complaints.       PAST MEDICAL & SURGICAL HISTORY:  Dementia      Hypertension      Xerotic eczema      Cataract      S/P cataract surgery          SOCIAL HISTORY:    Admitted from:  [facility]   (assisted living)         has private live-in 24 hr HHA  [ none ] Substance abuse, [ none ] Tobacco hx, [ none ] Alcohol hx, [ none ] Home Opioid Hx    FAMILY HISTORY:  Unable to obtain from patient due to poor mentation, family unable to give information, see H&P for history  Baseline ADLs (prior to admission):  Ambulatory with walker, still continent, requires moderate assistance with ADLs (bathing/dressing).  Still goes down to dining room for meals, participates in group activities w/assistance of HHA.    Shinto:  [employer]    Allergies    No Known Allergies    Intolerances      Present Symptoms: Mild, Moderate, Severe  Pain:  Denies presently  Fatigue:  denies  Nausea:  denies  SOB:  mild  Depression:  denies  Anxiety:  mild   Constipation:  denies     Review of Systems:  All other systems reviewed and are negative       MEDICATIONS  (STANDING):  albuterol/ipratropium for Nebulization 3 milliLiter(s) Nebulizer every 6 hours  amLODIPine   Tablet 5 milliGRAM(s) Oral daily  cefTRIAXone   IVPB 1000 milliGRAM(s) IV Intermittent every 24 hours  cholecalciferol 1000 Unit(s) Oral daily  donepezil 10 milliGRAM(s) Oral at bedtime  heparin   Injectable 5000 Unit(s) SubCutaneous every 8 hours  melatonin 5 milliGRAM(s) Oral at bedtime  memantine 5 milliGRAM(s) Oral daily  metroNIDAZOLE  IVPB      metroNIDAZOLE  IVPB 500 milliGRAM(s) IV Intermittent every 12 hours  QUEtiapine 100 milliGRAM(s) Oral <User Schedule>  sodium chloride 0.9%. 1000 milliLiter(s) (100 mL/Hr) IV Continuous <Continuous>  valproic  acid Syrup 250 milliGRAM(s) Oral <User Schedule>    MEDICATIONS  (PRN):  ondansetron Injectable 4 milliGRAM(s) IV Push every 8 hours PRN Nausea and/or Vomiting      PHYSICAL EXAM:  Vital Signs Last 24 Hrs  T(C): 37.2 (17 May 2025 20:03), Max: 37.2 (17 May 2025 20:03)  T(F): 98.9 (17 May 2025 20:03), Max: 98.9 (17 May 2025 20:03)  HR: 91 (17 May 2025 22:05) (85 - 108)  BP: 154/89 (17 May 2025 20:03) (141/101 - 168/95)  BP(mean): 105 (17 May 2025 20:03) (89 - 119)  RR: 22 (17 May 2025 20:03) (16 - 22)  SpO2: 97% (17 May 2025 22:05) (90% - 100%)    Parameters below as of 17 May 2025 22:05  Patient On (Oxygen Delivery Method): nasal cannula, 1.5        General: alert  oriented x __1__    calm but anxious, + mild temporal wasting, in NAD    Palliative Performance Scale/Karnofsky Score:  40%  http://npcrc.org/files/news/palliative_performance_scale_ppsv2.pdf    HEENT:  EOMI anicteric, pharynx clear, MM moist  Lungs: mildly tachypneic with transmitted wheezing/upper airway congestion throughout, respirations unlabored  CV: RRR, normal S1 and S2, no murmur  GI: soft non distended non tender   + normal bowel sounds  : urinating  Musculoskeletal: weakness x4  in all extremities, ambulatory with walker, no edema noted  Skin: no abnormal skin lesions or DU noted, + decreased skin turgor  Neuro: no focal deficits, + severe cognitive impairment   + dysphagia  Oral intake ability:  moderate  capability, on pureed diet with moderately thickened liquids      LABS:                        12.7   12.18 )-----------( 115      ( 17 May 2025 05:35 )             39.0     05-17    140  |  108  |  19[H]  ----------------------------<  104[H]  3.9   |  22  |  1.01    Ca    8.5      17 May 2025 05:35  Phos  2.9     05-16  Mg     2.1     05-16    TPro  5.9[L]  /  Alb  2.6[L]  /  TBili  0.5  /  DBili  x   /  AST  26  /  ALT  19  /  AlkPhos  50  05-16    Urinalysis Basic - ( 17 May 2025 05:35 )    Color: x / Appearance: x / SG: x / pH: x  Gluc: 104 mg/dL / Ketone: x  / Bili: x / Urobili: x   Blood: x / Protein: x / Nitrite: x   Leuk Esterase: x / RBC: x / WBC x   Sq Epi: x / Non Sq Epi: x / Bacteria: x        RADIOLOGY & ADDITIONAL STUDIES:      ACC: [id number] EXAM:  XR CHEST PORTABLE URGENT 1V   ORDERED BY: WENDI SHANNON     PROCEDURE DATE:  05/15/2025          INTERPRETATION:  CLINICAL STATEMENT: Chest pain.    TECHNIQUE: AP view of the chest.    COMPARISON: None available.    FINDINGS/  IMPRESSION:  Nonspecific opacities noted left mid and lower lung zone. Follow-up   recommended.    No pleural effusion.    Heart size within normal limits.    --- End of Report ---      ACC: [id number] EXAM:  CT BRAIN   ORDERED BY: WENDI SHANNON     PROCEDURE DATE:  05/15/2025          INTERPRETATION:  CLINICAL INFORMATION: ams    COMPARISON: None.    CONTRAST:  IV Contrast: None.    TECHNIQUE:  Serial axial images were obtained from the skull base to the   vertex using multi-slice helical technique. Sagittal and coronal   reformats were obtained.    FINDINGS:    VENTRICLES AND SULCI: Age appropriate involutional changes.  INTRA-AXIAL: No mass effect, acute hemorrhage, or midline shift.  There   are periventricular and subcortical white matter hypodensities,   consistent with microvascular type changes. No acute vascular territory   infarct.  EXTRA-AXIAL: No mass or fluid collection. Basal cisterns are normal in   appearance.    VISUALIZED SINUSES:  Clear.  TYMPANOMASTOID CAVITIES:  Clear.  VISUALIZED ORBITS: Bilateral lens replacement.  CALVARIUM: Intact.    MISCELLANEOUS: None.      IMPRESSION:  No acute intracranial pathology.    --- End of Report ---      ACC: [id number] EXAM:  CT ABDOMEN AND PELVIS   ORDERED BY: WENDI SHANNON     ACC: [id number] EXAM:  CT CHEST   ORDERED BY: WENDI SHANNON     PROCEDURE DATE:  05/15/2025          INTERPRETATION:  CLINICAL INFORMATION: Hypoxia, nausea and diarrhea.    COMPARISON: None.    CONTRAST/COMPLICATIONS:  IV Contrast: None  Oral Contrast: None      PROCEDURE:  CT of the Chest, Abdomen and Pelvis was performed.  Sagittal and coronal reformats were performed.    FINDINGS:  Evaluation of solid organs and vascular structures is limited without   intravenous contrast. Exam is degraded by motion artifact.    CHEST:  LUNGS AND LARGE AIRWAYS: Evaluation degraded by motion artifact. Trace   secretions in the trachea. Bilateral groundglass nodular opacities with   superimposed areas of nodular consolidation. Mild bibasilar atelectasis.  PLEURA: No pleural effusion.  VESSELS: Aortic calcifications.  HEART: Heart size is normal. Trace pericardial effusion.  MEDIASTINUM AND LAY: No lymphadenopathy.  CHEST WALL AND LOWER NECK: 3 mm hypodense right thyroid nodule.    ABDOMEN AND PELVIS:  LIVER: Within normal limits.  BILE DUCTS: Normal caliber.  GALLBLADDER: Limited evaluation due to motion artifact.  SPLEEN: Grossly within normal limits. Limited evaluationof the superior   aspect due to motion artifact.  PANCREAS: Within normal limits.  ADRENALS: Within normal limits.  KIDNEYS/URETERS: No renal stones or hydronephrosis.    BLADDER: Mild wall thickening but underdistended.  REPRODUCTIVE ORGANS: Enlarged prostate.    BOWEL: No bowel obstruction. Appendix is normal. Mild wall thickening of   the cecum and ascending colon.  PERITONEUM/RETROPERITONEUM: Within normal limits.  VESSELS: Atherosclerotic changes.  LYMPH NODES: No lymphadenopathy.  ABDOMINAL WALL: Small fat-containing umbilical hernia. Small bilateral   fat-containing inguinal hernias.  BONES: Degenerative changes.    IMPRESSION:  Exam limited by noncontrast technique and motion artifact.    Bilateral groundglass nodular opacities with superimposed areas of   nodular consolidation, likely infectious in etiology. Follow-up CT after   treatment is recommended to ensure resolution.    Mild wall thickening of the cecum and ascending colon could reflect   underdistention versus colitis. Clinical correlation recommended.      --- End of Report ---

## 2025-05-17 NOTE — CONSULT NOTE ADULT - TIME BILLING
Chart review (including review of imaging and test results), examination of patient, discussion with family via telephone, collaboration with primary medical team, and documentation in the medical record.

## 2025-05-17 NOTE — CONSULT NOTE ADULT - PROBLEM SELECTOR RECOMMENDATION 6
In the setting of progressive dementia, patient already requiring moderate assistance with ADLs at baseline.  At significant risk of deconditioning during this hospitalization, as well as at increased risk of ongoing aspiration, recurrent infections, worsening skin failure/breakdown, and repeat hospital admissions.      Recommend:  Please obtain format PT consult  OOB to chair/bedside PT as tolerated  Frequent turning and repositioning while in bed

## 2025-05-17 NOTE — CONSULT NOTE ADULT - PROBLEM SELECTOR RECOMMENDATION 5
Clinical evidence indicates that the patient has Moderate protein calorie malnutrition/ 2nd degree  In context of Chronic Illness (>1 month)--advanced dementia with behavioral disturbance, as evidenced by:    Energy/Food intake <75% of estimated energy requirement >7 days  Weight loss: Mild  (lbs lost recently)  Body Fat loss: Mild   + mild temporal wasting  Muscle mass loss: Mild   albumin 2.6, at high risk for skin failure     Strength: weakened Mild     Recommend:   c/w pureed  diet with thickened liquids, strict aspiration precautions, keep head of bed at least 45 degrees during feeding  Consider nutritional supplements as tolerated and/or formal nutrition consult

## 2025-05-17 NOTE — PROGRESS NOTE ADULT - PROBLEM SELECTOR PLAN 1
Setting of aspiration pneumonitis and subsequent pneumonia  procal elevated, wbc mildly elevated  Improving, on nasal cannula 2lpm, saturating 99%, discussed with RN remove oxygen  Seen by speech and swallow- purree with mod thick liquids  Discussed with palliative Care, will be consulted

## 2025-05-17 NOTE — PROGRESS NOTE ADULT - PROBLEM SELECTOR PLAN 7
has h/o dementia with behavioural issues on donepezil, Depakote and Seroquel at home  c/w home meds  may give Zyprexa 2.5mg im prn for severe agitation  monitor qtc    Patient missed an appointment with neurology due to hospitalization. Outpatient neurologist was planning on starting memantine, will start with low dose memantine 5mg now, followup outpatient neuro    Discussed with Valeriy Jalloh today, dementia is an irreversible disease, and all medications are aimed at slowing progression, will not reverse.  His swallow difficulties is due to advancing dementia. Palliative care consulted.

## 2025-05-17 NOTE — CONSULT NOTE ADULT - PROBLEM SELECTOR RECOMMENDATION 3
As above  Continue IV ceftriaxone and metronidazole  Remainder of management as per primary medical team

## 2025-05-17 NOTE — CONSULT NOTE ADULT - FAMILY/CHILD(REN)
Pt was unable to have her RFA at [place] location d/t her BMI. [place] [facility] [place] able and willing to do that. We will notify patient when we get OR time at that location.    vanessa Jalloh  [phone]

## 2025-05-17 NOTE — PROGRESS NOTE ADULT - ASSESSMENT
84y/M, from [facility], ambulates with walker, PMH of Dementia with behavioural issues, HTN, presented to ED with multiple episodes of loose bm with associated nausea and vomiting, and choking episode. Patient hypoxic to 90% on RA on arrival. Ct showing colitis and Bilateral groundglass nodular opacities with superimposed areas of nodular consolidation. Admitted for AHRF 2/2 aspiration pneumonia due to colitis.

## 2025-05-17 NOTE — CONSULT NOTE ADULT - PROBLEM SELECTOR RECOMMENDATION 2
In the setting of aspiration pneumonia vs pneumonitis  Continue IV antibiotics per primary team  Started on Duonebs Q6 ATC  SLP eval appreciated--downgraded to pureed diet

## 2025-05-17 NOTE — CONSULT NOTE ADULT - CONVERSATION DETAILS
Called son late this afternoon in attempt to discuss prognosis, ACP, goals of care, and treatment preferences.  Regino stated he is busy with his family at this time, requested that our conversation be rescheduled to Monday morning 5/19.

## 2025-05-17 NOTE — CONSULT NOTE ADULT - PROBLEM SELECTOR RECOMMENDATION 9
History of advanced dementia with behavioral issues, with multiple hospitalizations for agitation in the past.  By clinical assessment, patient is early to mid FAST stage 6, but now admitted with aspiration pneumonia and progressive dysphagia.  Patient is at substantially increased risk of further morbidity, recurrent hospitalizations, and decline towards death over the next 12 months.  Patient is low threshold for hospice in the very near future.  He is also at increased risk of needing higher level of care (i.e NH placement)--although return to assisted living with 24 hour HHA in place would be the most ideal outcome.    See GOC above--son unable to speak today, full GOC discussion deferred to Mon 5/19 (son would benefit from additional anticipatory guidance regarding prognosis and expected disease trajectory)    Continue home donepezil, valproic acid, and quetiapine  Memantine added by primary team at son's request, though I am doubtful it will have any efficacy  For additional breakthrough agitation, would recommend PRN doses of Seroquel or IM Zyprexa if needed  Continue supportive care

## 2025-05-17 NOTE — PROGRESS NOTE ADULT - SUBJECTIVE AND OBJECTIVE BOX
INTERVAL HPI/OVERNIGHT EVENTS:   Discussed with private aid at bedside, no diarrhea, patient at baseline mental status.  Seen by swallow therapy, recommending puree with moderately thick, he was veing given regular diet and thin liquids at [facility].      REVIEW OF SYSTEMS:  Unable to assess    Vital Signs Last 24 Hrs  T(C): 36.5 (17 May 2025 08:10), Max: 37.4 (16 May 2025 20:20)  T(F): 97.7 (17 May 2025 08:10), Max: 99.4 (16 May 2025 20:20)  HR: 85 (17 May 2025 12:14) (85 - 91)  BP: 141/101 (17 May 2025 08:10) (141/101 - 168/95)  BP(mean): 114 (17 May 2025 08:10) (114 - 119)  RR: 17 (17 May 2025 12:14) (16 - 18)  SpO2: 99% (17 May 2025 12:14) (96% - 99%)    Parameters below as of 17 May 2025 12:14  Patient On (Oxygen Delivery Method): nasal cannula  O2 Flow (L/min): 2      PHYSICAL EXAMINATION:  opens eyes, lungs without wheezing today, mild rhonchi, heart regular, abd soft nontender, no lower ext edema, AAOx0, on nasal cannula 2lpm 99%                        12.7   12.18 )-----------( 115      ( 17 May 2025 05:35 )             39.0     05-17    140  |  108  |  19[H]  ----------------------------<  104[H]  3.9   |  22  |  1.01    Ca    8.5      17 May 2025 05:35  Phos  2.9     05-16  Mg     2.1     05-16    TPro  5.9[L]  /  Alb  2.6[L]  /  TBili  0.5  /  DBili  x   /  AST  26  /  ALT  19  /  AlkPhos  50  05-16    LIVER FUNCTIONS - ( 16 May 2025 05:26 )  Alb: 2.6 g/dL / Pro: 5.9 g/dL / ALK PHOS: 50 U/L / ALT: 19 U/L DA / AST: 26 U/L / GGT: x                   CAPILLARY BLOOD GLUCOSE      RADIOLOGY & ADDITIONAL TESTS:

## 2025-05-17 NOTE — CONSULT NOTE ADULT - PROBLEM SELECTOR RECOMMENDATION 4
CT findings appreciated  Continue IV ceftriaxone and Flagyl    Remainder of management as per primary medical team

## 2025-05-18 DIAGNOSIS — R53.81 OTHER MALAISE: ICD-10-CM

## 2025-05-18 DIAGNOSIS — E44.0 MODERATE PROTEIN-CALORIE MALNUTRITION: ICD-10-CM

## 2025-05-18 DIAGNOSIS — Z51.5 ENCOUNTER FOR PALLIATIVE CARE: ICD-10-CM

## 2025-05-18 DIAGNOSIS — F03.C0 UNSPECIFIED DEMENTIA, SEVERE, WITHOUT BEHAVIORAL DISTURBANCE, PSYCHOTIC DISTURBANCE, MOOD DISTURBANCE, AND ANXIETY: ICD-10-CM

## 2025-05-18 LAB
ANION GAP SERPL CALC-SCNC: 8 MMOL/L — SIGNIFICANT CHANGE UP (ref 5–17)
BUN SERPL-MCNC: 21 MG/DL — HIGH (ref 7–18)
CALCIUM SERPL-MCNC: 8.5 MG/DL — SIGNIFICANT CHANGE UP (ref 8.4–10.5)
CHLORIDE SERPL-SCNC: 109 MMOL/L — HIGH (ref 96–108)
CO2 SERPL-SCNC: 25 MMOL/L — SIGNIFICANT CHANGE UP (ref 22–31)
CREAT SERPL-MCNC: 0.97 MG/DL — SIGNIFICANT CHANGE UP (ref 0.5–1.3)
EGFR: 78 ML/MIN/1.73M2 — SIGNIFICANT CHANGE UP
EGFR: 78 ML/MIN/1.73M2 — SIGNIFICANT CHANGE UP
GLUCOSE SERPL-MCNC: 128 MG/DL — HIGH (ref 70–99)
HCT VFR BLD CALC: 37.3 % — LOW (ref 39–50)
HGB BLD-MCNC: 12.1 G/DL — LOW (ref 13–17)
MCHC RBC-ENTMCNC: 30 PG — SIGNIFICANT CHANGE UP (ref 27–34)
MCHC RBC-ENTMCNC: 32.4 G/DL — SIGNIFICANT CHANGE UP (ref 32–36)
MCV RBC AUTO: 92.3 FL — SIGNIFICANT CHANGE UP (ref 80–100)
NRBC BLD AUTO-RTO: 0 /100 WBCS — SIGNIFICANT CHANGE UP (ref 0–0)
PLATELET # BLD AUTO: 158 K/UL — SIGNIFICANT CHANGE UP (ref 150–400)
POTASSIUM SERPL-MCNC: 3.7 MMOL/L — SIGNIFICANT CHANGE UP (ref 3.5–5.3)
POTASSIUM SERPL-SCNC: 3.7 MMOL/L — SIGNIFICANT CHANGE UP (ref 3.5–5.3)
RBC # BLD: 4.04 M/UL — LOW (ref 4.2–5.8)
RBC # FLD: 14 % — SIGNIFICANT CHANGE UP (ref 10.3–14.5)
SODIUM SERPL-SCNC: 142 MMOL/L — SIGNIFICANT CHANGE UP (ref 135–145)
WBC # BLD: 10.3 K/UL — SIGNIFICANT CHANGE UP (ref 3.8–10.5)
WBC # FLD AUTO: 10.3 K/UL — SIGNIFICANT CHANGE UP (ref 3.8–10.5)

## 2025-05-18 PROCEDURE — 99233 SBSQ HOSP IP/OBS HIGH 50: CPT

## 2025-05-18 RX ORDER — ACETAMINOPHEN 500 MG/5ML
650 LIQUID (ML) ORAL ONCE
Refills: 0 | Status: COMPLETED | OUTPATIENT
Start: 2025-05-18 | End: 2025-05-18

## 2025-05-18 RX ORDER — METRONIDAZOLE 250 MG
500 TABLET ORAL EVERY 12 HOURS
Refills: 0 | Status: DISCONTINUED | OUTPATIENT
Start: 2025-05-19 | End: 2025-05-21

## 2025-05-18 RX ORDER — METRONIDAZOLE 250 MG
500 TABLET ORAL ONCE
Refills: 0 | Status: COMPLETED | OUTPATIENT
Start: 2025-05-18 | End: 2025-05-18

## 2025-05-18 RX ORDER — AZITHROMYCIN 250 MG
500 CAPSULE ORAL EVERY 24 HOURS
Refills: 0 | Status: DISCONTINUED | OUTPATIENT
Start: 2025-05-18 | End: 2025-05-18

## 2025-05-18 RX ORDER — METRONIDAZOLE 250 MG
TABLET ORAL
Refills: 0 | Status: DISCONTINUED | OUTPATIENT
Start: 2025-05-18 | End: 2025-05-21

## 2025-05-18 RX ORDER — METRONIDAZOLE 250 MG
500 TABLET ORAL EVERY 12 HOURS
Refills: 0 | Status: DISCONTINUED | OUTPATIENT
Start: 2025-05-18 | End: 2025-05-18

## 2025-05-18 RX ADMIN — HEPARIN SODIUM 5000 UNIT(S): 1000 INJECTION INTRAVENOUS; SUBCUTANEOUS at 13:36

## 2025-05-18 RX ADMIN — AMLODIPINE BESYLATE 5 MILLIGRAM(S): 10 TABLET ORAL at 06:20

## 2025-05-18 RX ADMIN — CEFTRIAXONE 100 MILLIGRAM(S): 500 INJECTION, POWDER, FOR SOLUTION INTRAMUSCULAR; INTRAVENOUS at 06:19

## 2025-05-18 RX ADMIN — IPRATROPIUM BROMIDE AND ALBUTEROL SULFATE 3 MILLILITER(S): .5; 2.5 SOLUTION RESPIRATORY (INHALATION) at 20:23

## 2025-05-18 RX ADMIN — Medication 100 MILLIGRAM(S): at 20:16

## 2025-05-18 RX ADMIN — MEMANTINE HYDROCHLORIDE 5 MILLIGRAM(S): 21 CAPSULE, EXTENDED RELEASE ORAL at 11:40

## 2025-05-18 RX ADMIN — IPRATROPIUM BROMIDE AND ALBUTEROL SULFATE 3 MILLILITER(S): .5; 2.5 SOLUTION RESPIRATORY (INHALATION) at 08:43

## 2025-05-18 RX ADMIN — IPRATROPIUM BROMIDE AND ALBUTEROL SULFATE 3 MILLILITER(S): .5; 2.5 SOLUTION RESPIRATORY (INHALATION) at 15:42

## 2025-05-18 RX ADMIN — DONEPEZIL HYDROCHLORIDE 10 MILLIGRAM(S): 5 TABLET ORAL at 21:00

## 2025-05-18 RX ADMIN — Medication 650 MILLIGRAM(S): at 22:48

## 2025-05-18 RX ADMIN — QUETIAPINE FUMARATE 100 MILLIGRAM(S): 25 TABLET ORAL at 20:56

## 2025-05-18 RX ADMIN — Medication 5 MILLIGRAM(S): at 21:00

## 2025-05-18 RX ADMIN — Medication 100 MILLIGRAM(S): at 06:19

## 2025-05-18 RX ADMIN — HEPARIN SODIUM 5000 UNIT(S): 1000 INJECTION INTRAVENOUS; SUBCUTANEOUS at 21:00

## 2025-05-18 RX ADMIN — Medication 250 MILLIGRAM(S): at 11:40

## 2025-05-18 RX ADMIN — HEPARIN SODIUM 5000 UNIT(S): 1000 INJECTION INTRAVENOUS; SUBCUTANEOUS at 06:20

## 2025-05-18 RX ADMIN — Medication 1000 UNIT(S): at 11:41

## 2025-05-18 RX ADMIN — Medication 250 MILLIGRAM(S): at 13:36

## 2025-05-18 RX ADMIN — Medication 250 MILLIGRAM(S): at 17:08

## 2025-05-18 RX ADMIN — Medication 250 MILLIGRAM(S): at 09:02

## 2025-05-18 NOTE — DISCHARGE NOTE PROVIDER - HOSPITAL COURSE
Pt is a 84y/M, from [facility] assisted living, ambulates with walker, PMH of Dementia with behavioural issues, HTN, presented to ED with multiple episodes of loose bm since yesterday with associated nausea and vomiting, As per HHA by bedside, patient had acute onset watery bowel movement, multiple episodes, no blood in stool. He had an episode of nausea and vomiting, patient had a choking episode too. Denies any fever and chills, chest pain, SOB, palpitation.   Patient was admitted from 04/11-04/15 to [facility] for agitation. As per HHA, there has been no improvement in his agitation, he sundowns.     Pt was hypoxic to 90% on RA and his Ct showing colitis and bilateral groundglass nodular opacities with superimposed areas of nodular consolidation.   He was admitted for AHRF 2/2 aspiration pneumonia due to colitis.     He was treated with IV Rocephin and flagyl and his antibiotic was later narrowed to Azithromycin and Rocephin.    Pt was evaluated by PT and recommended Home PT back at his [facility].    Pt is a 84y/M, from [facility] assisted living, ambulates with walker, PMH of Dementia with behavioural issues, HTN, presented to ED with multiple episodes of loose bm since yesterday with associated nausea and vomiting, As per HHA by bedside, patient had acute onset watery bowel movement, multiple episodes, no blood in stool. He had an episode of nausea and vomiting, patient had a choking episode too. Denies any fever and chills, chest pain, SOB, palpitation.   Patient was admitted from 04/11-04/15 to [facility] for agitation. As per HHA, there has been no improvement in his agitation, he sundowns.     Pt was hypoxic to 90% on RA and his Ct showing colitis and bilateral groundglass nodular opacities with superimposed areas of nodular consolidation.   He was admitted for AHRF 2/2 aspiration pneumonia due to colitis.     He was treated with IV Rocephin and flagyl.     Pt was evaluated by PT and recommended Home PT back at his long term.    Pt is a 84y/M, from [facility] assisted living, ambulates with walker, PMH of Dementia with behavioural issues, HTN, presented to ED with multiple episodes of loose bm since yesterday with associated nausea and vomiting, As per HHA by bedside, patient had acute onset watery bowel movement, multiple episodes, no blood in stool. He had an episode of nausea and vomiting, patient had a choking episode too. Denies any fever and chills, chest pain, SOB, palpitation.   Patient was admitted from 04/11-04/15 to [facility] for agitation. As per HHA, there has been no improvement in his agitation, he sundowns.     Pt was hypoxic to 90% on RA and his Ct showing colitis and bilateral groundglass nodular opacities with superimposed areas of nodular consolidation.   He was admitted for AHRF 2/2 aspiration pneumonia due to colitis.     He was treated with IV Rocephin and flagyl. Pt completed abx here.     Pt was evaluated by PT and recommended Home PT back at his [facility].  Palliative followed , Pending d/c back to [facility] with hospice.

## 2025-05-18 NOTE — PROGRESS NOTE ADULT - SUBJECTIVE AND OBJECTIVE BOX
INTERVAL HPI/OVERNIGHT EVENTS:   Patient had some wheezing yesterday evening, again became hypoxic requiring oxygen supplementation.  Suspect patient had an aspiration episode.  He is mostly fatigued today per private hire aid at bedside. he has been less hyperactive than his baseline.    REVIEW OF SYSTEMS:  unable to assess    Vital Signs Last 24 Hrs  T(C): 37.9 (18 May 2025 13:40), Max: 37.9 (18 May 2025 13:40)  T(F): 100.2 (18 May 2025 13:40), Max: 100.2 (18 May 2025 13:40)  HR: 94 (18 May 2025 13:40) (79 - 112)  BP: 164/83 (18 May 2025 13:40) (147/74 - 164/83)  BP(mean): 105 (17 May 2025 20:03) (89 - 105)  RR: 24 (18 May 2025 13:40) (18 - 24)  SpO2: 94% (18 May 2025 13:40) (90% - 100%)    Parameters below as of 18 May 2025 13:40  Patient On (Oxygen Delivery Method): nasal cannula  O2 Flow (L/min): 1      PHYSICAL EXAMINATION:  mild wob, increased tachypnea, responds to physical stimuli, heart regular, lungs with mild wheezing                          12.1   10.30 )-----------( 158      ( 18 May 2025 07:18 )             37.3     05-18    142  |  109[H]  |  21[H]  ----------------------------<  128[H]  3.7   |  25  |  0.97    Ca    8.5      18 May 2025 07:18                CAPILLARY BLOOD GLUCOSE      RADIOLOGY & ADDITIONAL TESTS:

## 2025-05-18 NOTE — PHYSICAL THERAPY INITIAL EVALUATION ADULT - PERSONAL SAFETY AND JUDGMENT, REHAB EVAL
Chief Complaint   Patient presents with   • Edema     F/V Dx: Katelynn Michael P.A.-C. Leg swelling    • Pacemaker Check/Dysfunction     F/V Dx: Automatic implantable cardiac defibrillator - [employer] - sub cutaneous       Subjective     Lashay Bal [facility] Torrey Mata NP [id number]     Confirmed by Jassi Cardenas (Td Salgado MD PhD FAC  Cardiologist [facility]    Please note that this dictation was created using voice recognition software. There may be errors I did not discover before finalizing the note.                impaired

## 2025-05-18 NOTE — PROGRESS NOTE ADULT - ASSESSMENT
84y/M, from [facility], ambulates with walker, PMH of Dementia with behavioural issues, HTN, presented to ED with multiple episodes of loose bm with associated nausea and vomiting, and choking episode. Patient hypoxic to 90% on RA on arrival. Ct showing colitis and Bilateral groundglass nodular opacities with superimposed areas of nodular consolidation. Admitted for AHRF 2/2 aspiration pneumonia due to colitis.

## 2025-05-18 NOTE — PROGRESS NOTE ADULT - PROBLEM SELECTOR PLAN 3
Appears to be improving  COntinue ceftriaxone, metronidazole discontinued  Colitis appears improving- no further diarrheal episodes Appears to be improving  COntinue ceftriaxone, after discussion with ID, continue metronidazole  Colitis appears improving- no further diarrheal episodes

## 2025-05-18 NOTE — DISCHARGE NOTE PROVIDER - ATTENDING DISCHARGE PHYSICAL EXAMINATION:
Patient was seen and examined at bedside. At his baseline mental and functional status. TATE Szymanski at bedside reports patient is eating well today. Denies any new complains     ICU Vital Signs Last 24 Hrs  T(C): 37.1 (22 May 2025 11:47), Max: 37.1 (22 May 2025 11:47)  T(F): 98.7 (22 May 2025 11:47), Max: 98.7 (22 May 2025 11:47)  HR: 79 (22 May 2025 11:47) (72 - 84)  BP: 155/78 (22 May 2025 11:47) (155/78 - 186/96)  BP(mean): 108 (22 May 2025 05:40) (108 - 129)  ABP: --  ABP(mean): --  RR: 18 (22 May 2025 11:47) (18 - 18)  SpO2: 100% (22 May 2025 11:47) (94% - 100%)    O2 Parameters below as of 22 May 2025 11:47  Patient On (Oxygen Delivery Method): room air    P/E:  NAD  AAOx1, no new focal deficit   clear to auscultation anterolaterally   S1S2 WNL  abd soft, nontender, BS present   BLLE no edema or calf tenderness     A/P:  Patient is stable to be discharged to [facility] with hospice.

## 2025-05-18 NOTE — PHYSICAL THERAPY INITIAL EVALUATION ADULT - GENERAL OBSERVATIONS, REHAB EVAL
patient received supine in bed, alert but keeps eyes closed and confused, NAD, denies pain, O2 via NC at 1L/min, saline lock intact; cooperative to PT evaluation

## 2025-05-18 NOTE — PHYSICAL THERAPY INITIAL EVALUATION ADULT - ADDITIONAL COMMENTS
HHA at bedside reports pt ambulating with RW on unit with SBA, needs minimal assist for bed mobility and sit<>stand, pt requires assistance in all ADLs. Denies falls within this year. HHA at bedside reports pt ambulating with RW on unit with SBA, needs minimal assist for bed mobility and sit<>stand, pt requires assistance in all ADLs. Denies falls within this year. Per son and HHA, pt has been receiving HPT at [facility] 3x/wk and goeas to PT gym 1x a week.

## 2025-05-18 NOTE — DISCHARGE NOTE PROVIDER - DETAILS OF MALNUTRITION DIAGNOSIS/DIAGNOSES
This patient has been assessed with a concern for Malnutrition and was treated during this hospitalization for the following Nutrition diagnosis/diagnoses:     -  05/20/2025: Moderate protein-calorie malnutrition

## 2025-05-18 NOTE — PHYSICAL THERAPY INITIAL EVALUATION ADULT - PATIENT/FAMILY AGREES WITH PLAN
son strongly prefers to return to [facility]/2 pt's cognitive issues, pt's son educated on current level of function and benefots of [facility] however, son continues to prefer d/c back to [facility]

## 2025-05-18 NOTE — PROGRESS NOTE ADULT - PROBLEM SELECTOR PLAN 1
Setting of aspiration pneumonitis and subsequent pneumonia  procal elevated, wbc mildly elevated    On 5/17, saturating well on room air. He likely had another aspiration episode and became hypoxic again. He had a soft diet as opposed to his ordered puree diet yesterday    Seen by speech and swallow- puree with mod thick liquids  Worsening dementia, recurrent aspiration episodes and hypoxia, unclear if assisted living will take patient back- palliative to discuss further with patient's son, Regino Jalloh. Setting of aspiration pneumonitis and subsequent pneumonia  procal elevated, wbc mildly elevated    On 5/17, saturating well on room air. He likely had another aspiration episode and became hypoxic again. Poor prognosis given recurrent aspiration in the setting of advancing dementia.    Seen by speech and swallow- puree with mod thick liquids  Worsening dementia, recurrent aspiration episodes and hypoxia, unclear if assisted living will take patient back- palliative to discuss further with patient's son, Regino Jalloh.

## 2025-05-18 NOTE — PROGRESS NOTE ADULT - PROBLEM SELECTOR PLAN 2
Continue with ceftriaxone  Mycoplasma positive, added azithromycin 500mg x 3 days Continue with ceftriaxone  continue metronidazole  IgM mycoplasma noted, discussed with ID, this is unlikely to be culprit, patient was improving on ceftriaxone and metronidazole, will continue

## 2025-05-18 NOTE — PROGRESS NOTE ADULT - PROBLEM SELECTOR PLAN 7
has h/o dementia with behavioural issues on donepezil, Depakote and Seroquel at home  c/w home meds  may give Zyprexa 2.5mg im prn for severe agitation  monitor qtc    Patient missed an appointment with neurology due to hospitalization. Outpatient neurologist was planning on starting memantine, will start with low dose memantine 5mg now, followup outpatient neuro    Discussed with Valeriy Jalloh today, dementia is an irreversible disease, and all medications are aimed at slowing progression, will not reverse.  His swallow difficulties is due to advancing dementia. Palliative care consulted. has h/o dementia with behavioural issues on donepezil, Depakote and Seroquel at home  c/w home meds  may give Zyprexa 2.5mg im prn for severe agitation  monitor qtc    Patient missed an appointment with neurology due to hospitalization. Outpatient neurologist was planning on starting memantine, will start with low dose memantine 5mg now, followup outpatient neuro    Discussed with Valeriy Jalloh, dementia is an irreversible disease, and all medications are aimed at slowing progression, will not reverse.  His swallow difficulties is suggestive of advanced and worsening dementia

## 2025-05-18 NOTE — DISCHARGE NOTE PROVIDER - NSDCCPCAREPLAN_GEN_ALL_CORE_FT
PRINCIPAL DISCHARGE DIAGNOSIS  Diagnosis: Pneumonia  Assessment and Plan of Treatment: Pneumonia is a lung infection that can cause a fever, cough, and trouble breathing.  Continue all antibiotics as ordered until complete.  Nutrition is important, eat small frequent meals.  Get lots of rest and drink fluids.  Call your health care provider upon arrival home from hospital and make a follow up appointment for one week.  If your cough worsens, you develop fever greater than 101', you have shaking chills, a fast heartbeat, trouble breathing and/or feel your are breathing much faster than usual, call your healthcare provider.  Make sure you wash your hands frequently.        SECONDARY DISCHARGE DIAGNOSES  Diagnosis: Acute hypoxic respiratory failure  Assessment and Plan of Treatment: You were treated for acute hypoxic respiratory failure and needed oxygen to maintain your respiratory function during the acute phase of your lung infection.  Report or monitor for any worsening symptoms of shortness of breath. Please follow up with your pulmonologist/primary care doctor as out patient.      Diagnosis: HTN (hypertension)  Assessment and Plan of Treatment: You are being treated for high blood pressure.  Continue taking your medication as prescribed to help prevent or minimize your risk of end organ damage.  Follow up with your doctor for routine blood pressure evaluation and medication regimen.  Report any symptoms of headaces with nausea or vomiting, visual changes, heart palpitation, chest pain or shortness.      Diagnosis: FOSTER (acute kidney injury)  Assessment and Plan of Treatment: In order to prevent further disease progression, continue to follow recommendations made by your primary provider/nephrologist. Continue a diet that is low in sodium and avoid foods that are concentrated in potassium and phosphorus. Continue your medications/supplementations as directed and avoid over-the-counter drugs that are harmful to kidneys, such as, Non-Steroidal Anti-Inflammatory Drugs (NSAIDs). Follow-up as outpatient to monitor your kidney function, as well as, vitamin D, Calcium, potassium, and phosphorus levels.      Diagnosis: Colitis  Assessment and Plan of Treatment: HOME CARE INSTRUCTIONS  Get plenty of rest.  Drink enough water and fluids to keep your urine clear or pale yellow.  Eat a well-balanced diet.  Call your caregiver for follow-up as recommended.  SEEK IMMEDIATE MEDICAL CARE IF:  You develop chills.  You have an oral temperature above 101.0F or higher not controlled by medicine.  You have extreme weakness, fainting, or dehydration.  You have repeated vomiting.  You develop severe belly (abdominal) pain or are passing bloody or tarry stools      Diagnosis: Dementia  Assessment and Plan of Treatment: Dementia is a condition that causes loss of memory, thought control, and judgment. Alzheimer disease is the most common cause of dementia. Other common causes are loss of blood flow or nerve damage in the brain, and long-term alcohol or drug use. Dementia cannot be cured or prevented, but treatment may slow or reduce your symptoms.  How is dementia diagnosed? Your healthcare provider will ask you or someone close to you about your symptoms. He or she will ask when your symptoms began, and if they have gotten worse with time. He or she may also ask if you have any family members with dementia.  Mental function testing checks how well you think and solve problems. You may be asked to draw a face clock. The clock may need to show a certain time of day. You may be asked what month it currently is, or the city you are in. Other tests may be used to check your attention, language skills, or ability to see how objects are spaced apart.  Memory testing will be done regularly so healthcare providers can monitor memory changes over time. Healthcare providers will test your long-term memory by asking questions about how much you remember from the past. They will also test your short-term memory by asking you to remember new facts.  Blood tests may be used to rule out any other conditions that could be causing your symptoms.   An MRI or CT scan can help healthcare providers find damage to your brain caused by dementia.   When should I or someone close to me seek immediate care?  You have signs of delirium, such as extreme confusion, and seeing or hearing things that are not there.  You become angry or violent, and cannot be calmed down.  You faint and cannot be woken.  When should I or someone close to me call my doctor?  You have a fever.  You have increased confusion, behavior, or mood changes.       PRINCIPAL DISCHARGE DIAGNOSIS  Diagnosis: Pneumonia  Assessment and Plan of Treatment: Pneumonia is a lung infection that can cause a fever, cough, and trouble breathing.  YOu completed antibiotics here  Nutrition is important, eat small frequent meals.  Get lots of rest and drink fluids.        SECONDARY DISCHARGE DIAGNOSES  Diagnosis: Colitis  Assessment and Plan of Treatment: You completed antibitics here  HOME CARE INSTRUCTIONS  Get plenty of rest.  Drink enough water and fluids to keep your urine clear or pale yellow.  Eat a well-balanced diet.  Call your caregiver for follow-up as recommended.  SEEK IMMEDIATE MEDICAL CARE IF:  You develop chills.  You have an oral temperature above 101.0F or higher not controlled by medicine.  You have extreme weakness, fainting, or dehydration.  You have repeated vomiting.  You develop severe belly (abdominal) pain or are passing bloody or tarry stools      Diagnosis: Acute hypoxic respiratory failure  Assessment and Plan of Treatment: You were treated for acute hypoxic respiratory failure and needed oxygen to maintain your respiratory function during the acute phase of your lung infection.  Report or monitor for any worsening symptoms of shortness of breath. Please follow up with your pulmonologist/primary care doctor as out patient.      Diagnosis: HTN (hypertension)  Assessment and Plan of Treatment: You are being treated for high blood pressure.  Continue taking your medication as prescribed to help prevent or minimize your risk of end organ damage.  Follow up with your doctor for routine blood pressure evaluation and medication regimen.  Report any symptoms of headaces with nausea or vomiting, visual changes, heart palpitation, chest pain or shortness.      Diagnosis: FOSTER (acute kidney injury)  Assessment and Plan of Treatment: In order to prevent further disease progression, continue to follow recommendations made by your primary provider/nephrologist. Continue a diet that is low in sodium and avoid foods that are concentrated in potassium and phosphorus. Continue your medications/supplementations as directed and avoid over-the-counter drugs that are harmful to kidneys, such as, Non-Steroidal Anti-Inflammatory Drugs (NSAIDs). Follow-up as outpatient to monitor your kidney function, as well as, vitamin D, Calcium, potassium, and phosphorus levels.      Diagnosis: Dementia  Assessment and Plan of Treatment: Dementia is a condition that causes loss of memory, thought control, and judgment. Alzheimer disease is the most common cause of dementia. Other common causes are loss of blood flow or nerve damage in the brain, and long-term alcohol or drug use. Dementia cannot be cured or prevented, but treatment may slow or reduce your symptoms.  How is dementia diagnosed? Your healthcare provider will ask you or someone close to you about your symptoms. He or she will ask when your symptoms began, and if they have gotten worse with time. He or she may also ask if you have any family members with dementia.  Mental function testing checks how well you think and solve problems. You may be asked to draw a face clock. The clock may need to show a certain time of day. You may be asked what month it currently is, or the city you are in. Other tests may be used to check your attention, language skills, or ability to see how objects are spaced apart.  Memory testing will be done regularly so healthcare providers can monitor memory changes over time. Healthcare providers will test your long-term memory by asking questions about how much you remember from the past. They will also test your short-term memory by asking you to remember new facts.  Blood tests may be used to rule out any other conditions that could be causing your symptoms.   An MRI or CT scan can help healthcare providers find damage to your brain caused by dementia.   When should I or someone close to me seek immediate care?  You have signs of delirium, such as extreme confusion, and seeing or hearing things that are not there.  You become angry or violent, and cannot be calmed down.  You faint and cannot be woken.  When should I or someone close to me call my doctor?  You have a fever.  You have increased confusion, behavior, or mood changes.       PRINCIPAL DISCHARGE DIAGNOSIS  Diagnosis: Pneumonia  Assessment and Plan of Treatment: Possibly due to recurrent aspiration. Pneumonia is a lung infection that can cause a fever, cough, and trouble breathing.  You completed antibiotics here  Nutrition is important, eat small frequent meals. You are started on Pureed diet with mildly thickened liquid after evaluation by speech and swallow evaluation.   Get lots of rest and drink fluids.        SECONDARY DISCHARGE DIAGNOSES  Diagnosis: Colitis  Assessment and Plan of Treatment: You completed antibitics here  HOME CARE INSTRUCTIONS  Get plenty of rest.  Drink enough water and fluids to keep your urine clear or pale yellow.  Eat a well-balanced diet.  Call your caregiver for follow-up as recommended.  SEEK IMMEDIATE MEDICAL CARE IF:  You develop chills.  You have an oral temperature above 101.0F or higher not controlled by medicine.  You have extreme weakness, fainting, or dehydration.  You have repeated vomiting.  You develop severe belly (abdominal) pain or are passing bloody or tarry stools      Diagnosis: Acute hypoxic respiratory failure  Assessment and Plan of Treatment: You were treated for acute hypoxic respiratory failure and needed oxygen to maintain your respiratory function during the acute phase of your lung infection.  Report or monitor for any worsening symptoms of shortness of breath. You are not required oxygen anymore.       Diagnosis: HTN (hypertension)  Assessment and Plan of Treatment: You are being treated for high blood pressure.  Continue taking your medication as prescribed to help prevent or minimize your risk of end organ damage.  Follow up with your doctor for routine blood pressure evaluation and medication regimen.  Report any symptoms of headaces with nausea or vomiting, visual changes, heart palpitation, chest pain or shortness.      Diagnosis: FOSTER (acute kidney injury)  Assessment and Plan of Treatment: In order to prevent further disease progression, continue to follow recommendations made by your primary provider/nephrologist. Continue a diet that is low in sodium and avoid foods that are concentrated in potassium and phosphorus. Continue your medications/supplementations as directed and avoid over-the-counter drugs that are harmful to kidneys, such as, Non-Steroidal Anti-Inflammatory Drugs (NSAIDs). Follow-up as outpatient to monitor your kidney function, as well as, vitamin D, Calcium, potassium, and phosphorus levels.      Diagnosis: Dementia  Assessment and Plan of Treatment: You were continued on Seroquel 50mg for behavioral disturbance. We also added Seroquel 25mg as needed for agitation.   Dementia is a condition that causes loss of memory, thought control, and judgment. Alzheimer disease is the most common cause of dementia. Other common causes are loss of blood flow or nerve damage in the brain, and long-term alcohol or drug use. Dementia cannot be cured or prevented, but treatment may slow or reduce your symptoms.  How is dementia diagnosed? Your healthcare provider will ask you or someone close to you about your symptoms. He or she will ask when your symptoms began, and if they have gotten worse with time. He or she may also ask if you have any family members with dementia.  Mental function testing checks how well you think and solve problems. You may be asked to draw a face clock. The clock may need to show a certain time of day. You may be asked what month it currently is, or the city you are in. Other tests may be used to check your attention, language skills, or ability to see how objects are spaced apart.  Memory testing will be done regularly so healthcare providers can monitor memory changes over time. Healthcare providers will test your long-term memory by asking questions about how much you remember from the past. They will also test your short-term memory by asking you to remember new facts.  Blood tests may be used to rule out any other conditions that could be causing your symptoms.   An MRI or CT scan can help healthcare providers find damage to your brain caused by dementia.   When should I or someone close to me seek immediate care?  You have signs of delirium, such as extreme confusion, and seeing or hearing things that are not there.  You become angry or violent, and cannot be calmed down.  You faint and cannot be woken.  When should I or someone close to me call my doctor?  You have a fever.  You have increased confusion, behavior, or mood changes.

## 2025-05-18 NOTE — PHYSICAL THERAPY INITIAL EVALUATION ADULT - PERTINENT HX OF CURRENT PROBLEM, REHAB EVAL
Patient is an 84y/M, with PMH of Dementia with behavioural issues, HTN, presented to ED with multiple episodes of loose bm with associated nausea and vomiting, and choking episode. Patient hypoxic to 90% on RA on arrival. Ct showing colitis and Bilateral groundglass nodular opacities with superimposed areas of nodular consolidation. Admitted for AHRF 2/2 aspiration pneumonia due to colitis.

## 2025-05-18 NOTE — DISCHARGE NOTE PROVIDER - NSDCMRMEDTOKEN_GEN_ALL_CORE_FT
acetaminophen 325 mg oral tablet: 2 tab(s) orally every 6 hours as needed for  mild pain  amLODIPine 5 mg oral tablet: 1 tab(s) orally once a day  bisacodyl 5 mg oral delayed release tablet: 1 tab(s) orally 2 times a day as needed for  constipation  cholecalciferol 25 mcg (1000 intl units) oral tablet: 1 tab(s) orally once a day  Depakote 250 mg oral delayed release tablet: 1 tab(s) orally 3 times a day  donepezil 10 mg oral tablet: 1 tab(s) orally once a day  Melatonin 3 mg oral tablet: 2 tab(s) orally once a day (at bedtime)  Polyethylene Glycol 3350: 17 gram(s) orally once a day as needed for  constipation  Refresh Digital PF ophthalmic solution: 1 drop(s) in each eye 2 times a day  Seroquel 100 mg oral tablet: 1 tab(s) orally once a day (at bedtime)   acetaminophen 325 mg oral tablet: 2 tab(s) orally every 6 hours as needed for  mild pain  amLODIPine 5 mg oral tablet: 1 tab(s) orally once a day  bisacodyl 5 mg oral delayed release tablet: 1 tab(s) orally 2 times a day as needed for  constipation  cholecalciferol 25 mcg (1000 intl units) oral tablet: 1 tab(s) orally once a day  Depakote 250 mg oral delayed release tablet: 1 tab(s) orally 3 times a day  donepezil 10 mg oral tablet: 1 tab(s) orally once a day  Melatonin 3 mg oral tablet: 2 tab(s) orally once a day (at bedtime)  Polyethylene Glycol 3350: 17 gram(s) orally once a day as needed for  constipation  Seroquel 100 mg oral tablet: 1 tab(s) orally once a day (at bedtime)   acetaminophen 325 mg oral tablet: 2 tab(s) orally every 6 hours as needed for  mild pain  amLODIPine 5 mg oral tablet: 1 tab(s) orally once a day  bisacodyl 5 mg oral delayed release tablet: 1 tab(s) orally 2 times a day as needed for  constipation  cholecalciferol 25 mcg (1000 intl units) oral tablet: 1 tab(s) orally once a day  Depakote 250 mg oral delayed release tablet: 1 tab(s) orally 3 times a day  donepezil 10 mg oral tablet: 1 tab(s) orally once a day  Melatonin 3 mg oral tablet: 2 tab(s) orally once a day (at bedtime)  Polyethylene Glycol 3350: 17 gram(s) orally once a day as needed for  constipation  QUEtiapine 25 mg oral tablet: 1 tab(s) orally every 8 hours As needed Agitation  QUEtiapine 50 mg oral tablet: 1 tab(s) orally once a day

## 2025-05-19 LAB
ANION GAP SERPL CALC-SCNC: 7 MMOL/L — SIGNIFICANT CHANGE UP (ref 5–17)
BUN SERPL-MCNC: 19 MG/DL — HIGH (ref 7–18)
CALCIUM SERPL-MCNC: 8.9 MG/DL — SIGNIFICANT CHANGE UP (ref 8.4–10.5)
CHLORIDE SERPL-SCNC: 109 MMOL/L — HIGH (ref 96–108)
CO2 SERPL-SCNC: 25 MMOL/L — SIGNIFICANT CHANGE UP (ref 22–31)
CREAT SERPL-MCNC: 0.85 MG/DL — SIGNIFICANT CHANGE UP (ref 0.5–1.3)
EGFR: 87 ML/MIN/1.73M2 — SIGNIFICANT CHANGE UP
EGFR: 87 ML/MIN/1.73M2 — SIGNIFICANT CHANGE UP
GLUCOSE SERPL-MCNC: 132 MG/DL — HIGH (ref 70–99)
HCT VFR BLD CALC: 36.6 % — LOW (ref 39–50)
HGB BLD-MCNC: 12 G/DL — LOW (ref 13–17)
MCHC RBC-ENTMCNC: 30.1 PG — SIGNIFICANT CHANGE UP (ref 27–34)
MCHC RBC-ENTMCNC: 32.8 G/DL — SIGNIFICANT CHANGE UP (ref 32–36)
MCV RBC AUTO: 91.7 FL — SIGNIFICANT CHANGE UP (ref 80–100)
NRBC BLD AUTO-RTO: 0 /100 WBCS — SIGNIFICANT CHANGE UP (ref 0–0)
PLATELET # BLD AUTO: 173 K/UL — SIGNIFICANT CHANGE UP (ref 150–400)
POTASSIUM SERPL-MCNC: 3.4 MMOL/L — LOW (ref 3.5–5.3)
POTASSIUM SERPL-SCNC: 3.4 MMOL/L — LOW (ref 3.5–5.3)
RBC # BLD: 3.99 M/UL — LOW (ref 4.2–5.8)
RBC # FLD: 14.1 % — SIGNIFICANT CHANGE UP (ref 10.3–14.5)
SODIUM SERPL-SCNC: 141 MMOL/L — SIGNIFICANT CHANGE UP (ref 135–145)
WBC # BLD: 9.27 K/UL — SIGNIFICANT CHANGE UP (ref 3.8–10.5)
WBC # FLD AUTO: 9.27 K/UL — SIGNIFICANT CHANGE UP (ref 3.8–10.5)

## 2025-05-19 PROCEDURE — 99233 SBSQ HOSP IP/OBS HIGH 50: CPT

## 2025-05-19 RX ORDER — QUETIAPINE FUMARATE 25 MG/1
50 TABLET ORAL DAILY
Refills: 0 | Status: DISCONTINUED | OUTPATIENT
Start: 2025-05-19 | End: 2025-05-23

## 2025-05-19 RX ADMIN — DONEPEZIL HYDROCHLORIDE 10 MILLIGRAM(S): 5 TABLET ORAL at 21:00

## 2025-05-19 RX ADMIN — Medication 5 MILLIGRAM(S): at 21:01

## 2025-05-19 RX ADMIN — HEPARIN SODIUM 5000 UNIT(S): 1000 INJECTION INTRAVENOUS; SUBCUTANEOUS at 05:30

## 2025-05-19 RX ADMIN — Medication 100 MILLIEQUIVALENT(S): at 12:57

## 2025-05-19 RX ADMIN — Medication 250 MILLIGRAM(S): at 15:02

## 2025-05-19 RX ADMIN — Medication 100 MILLIEQUIVALENT(S): at 15:02

## 2025-05-19 RX ADMIN — Medication 100 MILLIEQUIVALENT(S): at 10:19

## 2025-05-19 RX ADMIN — Medication 250 MILLIGRAM(S): at 09:13

## 2025-05-19 RX ADMIN — HEPARIN SODIUM 5000 UNIT(S): 1000 INJECTION INTRAVENOUS; SUBCUTANEOUS at 21:00

## 2025-05-19 RX ADMIN — IPRATROPIUM BROMIDE AND ALBUTEROL SULFATE 3 MILLILITER(S): .5; 2.5 SOLUTION RESPIRATORY (INHALATION) at 08:22

## 2025-05-19 RX ADMIN — MEMANTINE HYDROCHLORIDE 5 MILLIGRAM(S): 21 CAPSULE, EXTENDED RELEASE ORAL at 15:01

## 2025-05-19 RX ADMIN — Medication 650 MILLIGRAM(S): at 00:00

## 2025-05-19 RX ADMIN — Medication 100 MILLIGRAM(S): at 06:05

## 2025-05-19 RX ADMIN — QUETIAPINE FUMARATE 50 MILLIGRAM(S): 25 TABLET ORAL at 21:00

## 2025-05-19 RX ADMIN — IPRATROPIUM BROMIDE AND ALBUTEROL SULFATE 3 MILLILITER(S): .5; 2.5 SOLUTION RESPIRATORY (INHALATION) at 15:17

## 2025-05-19 RX ADMIN — IPRATROPIUM BROMIDE AND ALBUTEROL SULFATE 3 MILLILITER(S): .5; 2.5 SOLUTION RESPIRATORY (INHALATION) at 03:10

## 2025-05-19 RX ADMIN — Medication 100 MILLIGRAM(S): at 18:18

## 2025-05-19 RX ADMIN — Medication 1000 UNIT(S): at 13:00

## 2025-05-19 RX ADMIN — AMLODIPINE BESYLATE 5 MILLIGRAM(S): 10 TABLET ORAL at 05:30

## 2025-05-19 RX ADMIN — HEPARIN SODIUM 5000 UNIT(S): 1000 INJECTION INTRAVENOUS; SUBCUTANEOUS at 15:02

## 2025-05-19 RX ADMIN — CEFTRIAXONE 100 MILLIGRAM(S): 500 INJECTION, POWDER, FOR SOLUTION INTRAMUSCULAR; INTRAVENOUS at 05:30

## 2025-05-19 RX ADMIN — Medication 250 MILLIGRAM(S): at 18:46

## 2025-05-19 NOTE — PROGRESS NOTE ADULT - SUBJECTIVE AND OBJECTIVE BOX
NP Note discussed with  Primary Attending    Patient is a 84y old  Male who presents with a chief complaint of AHRF 2/2 ?aspiration pneumonia and colitis (19 May 2025 10:12)      INTERVAL HPI/OVERNIGHT EVENTS: no new complaints    MEDICATIONS  (STANDING):  albuterol/ipratropium for Nebulization 3 milliLiter(s) Nebulizer every 6 hours  amLODIPine   Tablet 5 milliGRAM(s) Oral daily  cefTRIAXone   IVPB 1000 milliGRAM(s) IV Intermittent every 24 hours  cholecalciferol 1000 Unit(s) Oral daily  donepezil 10 milliGRAM(s) Oral at bedtime  heparin   Injectable 5000 Unit(s) SubCutaneous every 8 hours  melatonin 5 milliGRAM(s) Oral at bedtime  memantine 5 milliGRAM(s) Oral daily  metroNIDAZOLE  IVPB      metroNIDAZOLE  IVPB 500 milliGRAM(s) IV Intermittent every 12 hours  potassium chloride  10 mEq/100 mL IVPB 10 milliEquivalent(s) IV Intermittent every 1 hour  QUEtiapine 50 milliGRAM(s) Oral daily  sodium chloride 0.9%. 1000 milliLiter(s) (100 mL/Hr) IV Continuous <Continuous>  valproic  acid Syrup 250 milliGRAM(s) Oral <User Schedule>    MEDICATIONS  (PRN):  ondansetron Injectable 4 milliGRAM(s) IV Push every 8 hours PRN Nausea and/or Vomiting      __________________________________________________  REVIEW OF SYSTEMS:    CONSTITUTIONAL: No fever,   EYES: no acute visual disturbances  NECK: No pain or stiffness  RESPIRATORY: No cough; No shortness of breath  CARDIOVASCULAR: No chest pain, no palpitations  GASTROINTESTINAL: No pain. No nausea or vomiting; No diarrhea   NEUROLOGICAL: No headache or numbness, no tremors  MUSCULOSKELETAL: No joint pain, no muscle pain  GENITOURINARY: no dysuria, no frequency, no hesitancy  PSYCHIATRY: no depression , no anxiety  ALL OTHER  ROS negative        Vital Signs Last 24 Hrs  T(C): 36.6 (19 May 2025 05:28), Max: 38 (18 May 2025 21:55)  T(F): 97.8 (19 May 2025 05:28), Max: 100.4 (18 May 2025 21:55)  HR: 92 (19 May 2025 05:28) (89 - 103)  BP: 156/78 (19 May 2025 05:28) (154/82 - 181/79)  BP(mean): --  RR: 20 (19 May 2025 05:28) (20 - 24)  SpO2: 98% (19 May 2025 05:28) (94% - 98%)    Parameters below as of 19 May 2025 05:28  Patient On (Oxygen Delivery Method): nasal cannula  O2 Flow (L/min): 3      ________________________________________________  PHYSICAL EXAM:  GENERAL: NAD  HEENT: Normocephalic;  conjunctivae and sclerae clear; moist mucous membranes;   NECK : supple  CHEST/LUNG: Clear to auscultation bilaterally with good air entry   HEART: S1 S2  regular; no murmurs, gallops or rubs  ABDOMEN: Soft, Nontender, Nondistended; Bowel sounds present  EXTREMITIES: no cyanosis; no edema; no calf tenderness  SKIN: warm and dry; no rash  NERVOUS SYSTEM:  Awake and alert; Oriented  to place, person and time ; no new deficits    _________________________________________________  LABS:                        12.0   9.27  )-----------( 173      ( 19 May 2025 07:13 )             36.6     05-19    141  |  109[H]  |  19[H]  ----------------------------<  132[H]  3.4[L]   |  25  |  0.85    Ca    8.9      19 May 2025 07:13        Urinalysis Basic - ( 19 May 2025 07:13 )    Color: x / Appearance: x / SG: x / pH: x  Gluc: 132 mg/dL / Ketone: x  / Bili: x / Urobili: x   Blood: x / Protein: x / Nitrite: x   Leuk Esterase: x / RBC: x / WBC x   Sq Epi: x / Non Sq Epi: x / Bacteria: x      CAPILLARY BLOOD GLUCOSE            RADIOLOGY & ADDITIONAL TESTS:    Imaging  Reviewed:  YES/NO    Consultant(s) Notes Reviewed:   YES/ No      Plan of care was discussed with patient and /or primary care giver; all questions and concerns were addressed  NP Note discussed with Primary Attending    Patient is a 84y old Male who presents with a chief complaint of AHRF 2/2 ?aspiration pneumonia and colitis (19 May 2025 09:20)      INTERVAL HPI/OVERNIGHT EVENTS: no new complaints at time of eval and appears more awake today.  Denies abd pain, cp or sob.      MEDICATIONS  (STANDING):  albuterol/ipratropium for Nebulization 3 milliLiter(s) Nebulizer every 6 hours  amLODIPine   Tablet 5 milliGRAM(s) Oral daily  cefTRIAXone   IVPB 1000 milliGRAM(s) IV Intermittent every 24 hours  cholecalciferol 1000 Unit(s) Oral daily  donepezil 10 milliGRAM(s) Oral at bedtime  heparin   Injectable 5000 Unit(s) SubCutaneous every 8 hours  melatonin 5 milliGRAM(s) Oral at bedtime  memantine 5 milliGRAM(s) Oral daily  metroNIDAZOLE  IVPB      metroNIDAZOLE  IVPB 500 milliGRAM(s) IV Intermittent every 12 hours  potassium chloride  10 mEq/100 mL IVPB 10 milliEquivalent(s) IV Intermittent every 1 hour  QUEtiapine 50 milliGRAM(s) Oral daily  sodium chloride 0.9%. 1000 milliLiter(s) (100 mL/Hr) IV Continuous <Continuous>  valproic  acid Syrup 250 milliGRAM(s) Oral <User Schedule>    MEDICATIONS  (PRN):  ondansetron Injectable 4 milliGRAM(s) IV Push every 8 hours PRN Nausea and/or Vomiting      __________________________________________________  REVIEW OF SYSTEMS:    CONSTITUTIONAL: No fever,   EYES: no acute visual disturbances  NECK: No pain or stiffness  RESPIRATORY: No cough; No shortness of breath  CARDIOVASCULAR: No chest pain, no palpitations  GASTROINTESTINAL: No pain. No nausea or vomiting; No diarrhea   NEUROLOGICAL: No headache or numbness, no tremors  MUSCULOSKELETAL: No joint pain, no muscle pain  GENITOURINARY: no dysuria, no frequency, no hesitancy  PSYCHIATRY: no depression , no anxiety  ALL OTHER  ROS negative        Vital Signs Last 24 Hrs  T(C): 36.6 (19 May 2025 05:28), Max: 38 (18 May 2025 21:55)  T(F): 97.8 (19 May 2025 05:28), Max: 100.4 (18 May 2025 21:55)  HR: 92 (19 May 2025 05:28) (89 - 103)  BP: 156/78 (19 May 2025 05:28) (154/82 - 181/79)  BP(mean): --  RR: 20 (19 May 2025 05:28) (20 - 24)  SpO2: 98% (19 May 2025 05:28) (94% - 98%)    Parameters below as of 19 May 2025 05:28  Patient On (Oxygen Delivery Method): nasal cannula  O2 Flow (L/min): 3      ________________________________________________  PHYSICAL EXAM:  GENERAL: weak appearing  HEENT: Normocephalic; conjunctivae and sclerae clear; moist mucous membranes;   NECK : Supple  CHEST/LUNG: diminished at the base but phonating well    HEART: S1 S2 regular; no murmurs, gallops or rubs  ABDOMEN: Soft, Nontender, Nondistended; Bowel sounds present  EXTREMITIES: no cyanosis; no edema; no calf tenderness  SKIN: warm and dry; no rash  NERVOUS SYSTEM:  Awake and alert; following instruction    _________________________________________________  LABS:                        12.0   9.27  )-----------( 173      ( 19 May 2025 07:13 )             36.6     05-19    141  |  109[H]  |  19[H]  ----------------------------<  132[H]  3.4[L]   |  25  |  0.85    Ca    8.9      19 May 2025 07:13        Urinalysis Basic - ( 19 May 2025 07:13 )    Color: x / Appearance: x / SG: x / pH: x  Gluc: 132 mg/dL / Ketone: x  / Bili: x / Urobili: x   Blood: x / Protein: x / Nitrite: x   Leuk Esterase: x / RBC: x / WBC x   Sq Epi: x / Non Sq Epi: x / Bacteria: x      CAPILLARY BLOOD GLUCOSE            RADIOLOGY & ADDITIONAL TESTS:    Imaging  Reviewed:  YES/NO    Consultant(s) Notes Reviewed:   YES/ No      Plan of care was discussed with patient and /or primary care giver; all questions and concerns were addressed

## 2025-05-19 NOTE — PROGRESS NOTE ADULT - ASSESSMENT
Pt is 84y/M, from [facility], ambulates with walker, PMH of Dementia with behavioural issues, HTN, presented to ED with multiple episodes of loose bm with associated nausea and vomiting, and choking episode. Patient hypoxic to 90% on RA on arrival. Ct showing colitis and Bilateral groundglass nodular opacities with superimposed areas of nodular consolidation. Admitted for AHRF 2/2 aspiration pneumonia due to colitis.  Pt is being treated with IV ceftriaxone and flagyl.  Wes temp 100.4F overnight and ID was consulted. Palliative following.

## 2025-05-19 NOTE — PROGRESS NOTE ADULT - PROBLEM SELECTOR PLAN 1
Admission Reconciliation is Completed  Discharge Reconciliation is Not Complete Admission Reconciliation is Completed  Discharge Reconciliation is Completed History of Lewy Body dementia with paranoia and otherbehavioral issues, with multiple hospitalizations for agitation in the past.    By clinical assessment, patient is mid FAST stage 6, but FAST staging is much less predictive in setting of Lewy Body disease.  Patient now presenting with worsening agitation and confusion over the last month, as well as progessive dysphagia.  Development of clinically significant aspiration with pneumonia in patient with LBD is a poor prognostic sign.  Patient is at substantially increased risk of further morbidity, recurrent hospitalizations, and decline towards death. In setting of advanced Lewy Body dementia, patient is hospice appropriate with general prognosis in the range of 6 to 12 months     See GOC discussion above--son counseled and given anticipatory guidance regarding prognosis and expected disease trajectory, understands that patient has advanced/progressive disease, is open to exploring return to [facility] with hospice services, will refer to [facility].  DNR/DNI in place    Continue home donepezil, valproic acid, and quetiapine  Memantine added by primary team at son's request, though I am doubtful it will have any efficacy  For additional breakthrough agitation, would recommend PRN doses of Seroquel or IM Zyprexa if needed  Continue supportive care.

## 2025-05-19 NOTE — CONSULT NOTE ADULT - CONSULT REASON
aspiration Pneumonia/ Fever/ Colitis
Complex medical decision making in the context of advanced dementia with behavioral disturbance, aspiration pneumonia, and progressive dysphagia

## 2025-05-19 NOTE — PROGRESS NOTE ADULT - PROBLEM SELECTOR PLAN 1
Setting of aspiration pneumonitis and subsequent pneumonia  procal elevated, wbc mildly elevated and now improved  FEver overnight  On 5/17, saturating well on room air. He likely had another aspiration episode and became hypoxic again. Poor prognosis given recurrent aspiration in the setting of advancing dementia.  Seen by speech and swallow- puree with mod thick liquids  Persistent cough despite diet per swallow evaluation    Worsening dementia, recurrent aspiration episodes and hypoxia, unclear if assisted living will take patient back- palliative to discuss further with patient's son, Regino Jalloh.  Poor long term prognosis, possibly hospice appropriate, followup palliative

## 2025-05-19 NOTE — PROGRESS NOTE ADULT - PROBLEM SELECTOR PLAN 7
As above.  85 yo male with advanced Lewy Body dementia with behavioral disturbances, mid FAST stage 6 by history, hx of multiple hospitalizations for worsening agitation, now admitted for AHRF 2/2 aspiration pneumonia, colitis, and FOSTER, with evidence of progressive dysphagia.  Substantially increased risk of further morbidity and mortality over the next 12 months, and FAST staging less predictive in this case  Onset of worsening dysphagia with clinically significant aspiration/aspiration pneumonia is poor prognostic sign in patients with advanced Lewy Body dementia.  Patient  is hospice appropriate with general prognosis of 6 to 12 months or less    See GOC discussion above--son given  anticipatory guidance and counseling regarding expected disease trajectory, amenable to potential return to [facility] with hospice services, medical team and [facility] SW aware.  Also given pre-emptive counseling about potential future PEG vs comfort feeds, no final decision at this time.    Otherwise continue management as per primary medical team  MOLST DNR/DNI orders now in place  Discussed with medical team and Dr. Kelsey in detail  Palliative Care team will continue to follow.

## 2025-05-19 NOTE — PROGRESS NOTE ADULT - PROBLEM SELECTOR PLAN 2
In the setting of aspiration pneumonia vs pneumonitis  Continue IV antibiotics per primary team  Started on Duonebs Q6 ATC  SLP eval appreciated--downgraded to pureed diet.

## 2025-05-19 NOTE — PROGRESS NOTE ADULT - ASSESSMENT
84y/M, from [facility] assisted living, ambulates with walker, PMHx of Dementia with behavioural issues, HTN, presented to ED with multiple episodes of loose bm with associated nausea and vomiting, and choking episode. Patient hypoxic to 90% on RA on arrival. Ct showing colitis and Bilateral groundglass nodular opacities with superimposed areas of nodular consolidation. Admitted for AHRF 2/2 aspiration pneumonia due to colitis.  SLP evaluation confirmed significant dysphagia.  Palliative Care consulted for additional GOC discussion

## 2025-05-19 NOTE — PROGRESS NOTE ADULT - PROBLEM SELECTOR PLAN 1
p/w sob, cough and a choking spell  CT Chest shows Trace secretions in the trachea. Bilateral groundglass nodular opacities with superimposed areas of nodular consolidation. Mild bibasilar atelectasis.  -2/2 aspiration pneumonia  -C/w CTX and flagyl   -aspiration precaution  -Speech and swallow eval- recommended puree with moderately thick liquid but continue to cough when eating  -Will ask S/S to re-eval pt.   -O2 supplementation, wean off as tolerated- weaned to 2L today- saturating well

## 2025-05-19 NOTE — PROGRESS NOTE ADULT - PROBLEM SELECTOR PLAN 7
has h/o dementia with behavioural issues on donepezil, Depakote and Seroquel at home  c/w home meds  Previously on seroquel 100mg, now that patient is exhibiting no signs of behavioral disturbance and in the setting of aspiration will decrease seroquel to 50mg on 5/19    Patient missed an appointment with neurology due to hospitalization. Outpatient neurologist was planning on starting memantine, will start with low dose memantine 5mg now, followup outpatient neuro    Discussed with Valeriy Jalloh, dementia is an irreversible disease, and all medications are aimed at slowing progression, will not reverse.  His swallow difficulties is suggestive of advanced and worsening dementia  Palliative care followup, suspect patient may be hospice appropriate with recurrent aspiration.

## 2025-05-19 NOTE — PROGRESS NOTE ADULT - SUBJECTIVE AND OBJECTIVE BOX
INTERVAL HPI/OVERNIGHT EVENTS:   Noted fever overnight. DIscussed with RN, patient is still having coughin episodes despite his puree moderately thick liquid.  Mental status appears improved- he responds spontaneously to verbal stimuli.  He is able to report shortness of breath no pain    REVIEW OF SYSTEMS:  negative except per hpi     Vital Signs Last 24 Hrs  T(C): 36.6 (19 May 2025 05:28), Max: 38 (18 May 2025 21:55)  T(F): 97.8 (19 May 2025 05:28), Max: 100.4 (18 May 2025 21:55)  HR: 92 (19 May 2025 05:28) (89 - 103)  BP: 156/78 (19 May 2025 05:28) (154/82 - 181/79)  BP(mean): --  RR: 20 (19 May 2025 05:28) (20 - 24)  SpO2: 98% (19 May 2025 05:28) (94% - 98%)    Parameters below as of 19 May 2025 05:28  Patient On (Oxygen Delivery Method): nasal cannula  O2 Flow (L/min): 3      PHYSICAL EXAMINATION:  mild wheezing, aaox1 to self, heart regular, abd nontender, no lower ext edema                          12.0   9.27  )-----------( 173      ( 19 May 2025 07:13 )             36.6     05-19    141  |  109[H]  |  19[H]  ----------------------------<  132[H]  3.4[L]   |  25  |  0.85    Ca    8.9      19 May 2025 07:13                CAPILLARY BLOOD GLUCOSE      RADIOLOGY & ADDITIONAL TESTS:

## 2025-05-19 NOTE — CONSULT NOTE ADULT - SUBJECTIVE AND OBJECTIVE BOX
HPI:  84y/M, from [facility] assisted living, ambulates with walker, PMH of Dementia with behavioural issues, HTN, presented to ED with multiple episodes of loose bm since yesterday with associated nausea and vomiting, As per HHA by bedside, patient had acute onset watery bowel movement, multiple episodes, no blood in stool. He had an episode of nausea and vomiting, patient had a choking episode too. Denies any fever and chills, chest pain, SOB, palpitation.   Patient was admitted from 04/11-04/15 to [facility] for agitation. As per HHA, there has been no improvement in his agitation, he sundowns.  (15 May 2025 10:03)            PAST MEDICAL & SURGICAL HISTORY:  Dementia      Hypertension      Xerotic eczema      Cataract      S/P cataract surgery          No Known Allergies      Meds:  amLODIPine   Tablet 5 milliGRAM(s) Oral daily  cefTRIAXone   IVPB 1000 milliGRAM(s) IV Intermittent every 24 hours  cholecalciferol 1000 Unit(s) Oral daily  donepezil 10 milliGRAM(s) Oral at bedtime  heparin   Injectable 5000 Unit(s) SubCutaneous every 8 hours  melatonin 5 milliGRAM(s) Oral at bedtime  memantine 5 milliGRAM(s) Oral daily  metroNIDAZOLE  IVPB      metroNIDAZOLE  IVPB 500 milliGRAM(s) IV Intermittent every 12 hours  ondansetron Injectable 4 milliGRAM(s) IV Push every 8 hours PRN  pantoprazole    Tablet 40 milliGRAM(s) Oral before breakfast  QUEtiapine 50 milliGRAM(s) Oral daily  sodium chloride 0.9%. 1000 milliLiter(s) IV Continuous <Continuous>  valproic  acid Syrup 250 milliGRAM(s) Oral <User Schedule>      SOCIAL HISTORY:  Smoker:  YES / NO        PACK YEARS:                         WHEN QUIT?  ETOH use:  YES / NO               FREQUENCY / QUANTITY:  Ilicit Drug use:  YES / NO  Occupation:  Assisted device use (Cane / Walker):  Live with:    FAMILY HISTORY:      VITALS:  Vital Signs Last 24 Hrs  T(C): 36.8 (19 May 2025 13:49), Max: 38 (18 May 2025 21:55)  T(F): 98.3 (19 May 2025 13:49), Max: 100.4 (18 May 2025 21:55)  HR: 88 (19 May 2025 14:34) (88 - 103)  BP: 167/88 (19 May 2025 14:34) (154/82 - 181/79)  BP(mean): --  RR: 20 (19 May 2025 14:34) (20 - 22)  SpO2: 96% (19 May 2025 14:34) (96% - 98%)    Parameters below as of 19 May 2025 14:34  Patient On (Oxygen Delivery Method): nasal cannula  O2 Flow (L/min): 3      LABS/DIAGNOSTIC TESTS:                          12.0   9.27  )-----------( 173      ( 19 May 2025 07:13 )             36.6     WBC Count: 9.27 K/uL (05-19 @ 07:13)  WBC Count: 10.30 K/uL (05-18 @ 07:18)  WBC Count: 12.18 K/uL (05-17 @ 05:35)      05-19    141  |  109[H]  |  19[H]  ----------------------------<  132[H]  3.4[L]   |  25  |  0.85    Ca    8.9      19 May 2025 07:13        Urine Microscopic-Add On (NC) (05.15.25 @ 07:20)   White Blood Cell - Urine: 0 /HPF  Red Blood Cell - Urine: 0 /HPF  Bacteria: Few /HPF  Squamous Epithelial Cells: PresentUrinalysis with Rflx Culture (05.15.25 @ 07:20)   Urine Appearance: Clear  Color: Dark Yellow  Specific Gravity: 1.024  pH Urine: 5.5  Protein, Urine: 30 mg/dL  Glucose Qualitative, Urine: Negative mg/dL  Ketone , Urine: Trace mg/dL  Blood, Urine: Negative  Bilirubin: Negative  Urobilinogen: 1.0 mg/dL  Leukocyte Esterase Concentration: Negative  Nitrite: Negative              LACTATE:    ABG -     CULTURES:   Blood Blood-Peripheral  05-15 @ 06:40   No growth at 4 days  --  --      Blood Blood-Peripheral  05-15 @ 06:30   No growth at 4 days  --  --          RADIOLOGY:< from: CT Abdomen and Pelvis No Cont (05.15.25 @ 06:00) >    ACC: [id number] EXAM:  CT ABDOMEN AND PELVIS   ORDERED BY: WENDI SHANNON     ACC: [id number] EXAM:  CT CHEST   ORDERED BY: FAZILA N SHIVA     PROCEDURE DATE:  05/15/2025          INTERPRETATION:  CLINICAL INFORMATION: Hypoxia, nausea and diarrhea.    COMPARISON: None.    CONTRAST/COMPLICATIONS:  IV Contrast: None  Oral Contrast: None      PROCEDURE:  CT of the Chest, Abdomen and Pelvis was performed.  Sagittal and coronal reformats were performed.    FINDINGS:  Evaluation of solid organs and vascular structures is limited without   intravenous contrast. Exam is degraded by motion artifact.    CHEST:  LUNGS AND LARGE AIRWAYS: Evaluation degraded by motion artifact. Trace   secretions in the trachea. Bilateral groundglass nodular opacities with   superimposed areas of nodular consolidation. Mild bibasilar atelectasis.  PLEURA: No pleural effusion.  VESSELS: Aortic calcifications.  HEART: Heart size is normal. Trace pericardial effusion.  MEDIASTINUM AND LAY: No lymphadenopathy.  CHEST WALL AND LOWER NECK: 3 mm hypodense right thyroid nodule.    ABDOMEN AND PELVIS:  LIVER: Within normal limits.  BILE DUCTS: Normal caliber.  GALLBLADDER: Limited evaluation due to motion artifact.  SPLEEN: Grossly within normal limits. Limited evaluationof the superior   aspect due to motion artifact.  PANCREAS: Within normal limits.  ADRENALS: Within normal limits.  KIDNEYS/URETERS: No renal stones or hydronephrosis.    BLADDER: Mild wall thickening but underdistended.  REPRODUCTIVE ORGANS: Enlarged prostate.    BOWEL: No bowel obstruction. Appendix is normal. Mild wall thickening of   the cecum and ascending colon.  PERITONEUM/RETROPERITONEUM: Within normal limits.  VESSELS: Atherosclerotic changes.  LYMPH NODES: No lymphadenopathy.  ABDOMINALWALL: Small fat-containing umbilical hernia. Small bilateral   fat-containing inguinal hernias.  BONES: Degenerative changes.    IMPRESSION:  Exam limited by noncontrast technique and motion artifact.    Bilateral groundglass nodular opacities with superimposed areas of   nodular consolidation, likely infectious in etiology. Follow-up CT after   treatment is recommended to ensure resolution.    Mild wall thickening of the cecum and ascending colon could reflect   underdistention versus colitis. Clinical correlation recommended.      --- End of Report ---            RISHABH STACK MD; Attending Radiologist  This document has been electronically signed. May 15 2025  6:17AM    < end of copied text >        ROS  [  ] UNABLE TO ELICIT               HPI:  84y/M, from [facility] assisted living, ambulates with walker, PMH of Dementia with behavioural issues, HTN, presented to ED with multiple episodes of loose bm since yesterday with associated nausea and vomiting, As per HHA by bedside, patient had acute onset watery bowel movement, multiple episodes, no blood in stool. He had an episode of nausea and vomiting, patient had a choking episode too. Denies any fever and chills, chest pain, SOB, palpitation.   Patient was admitted from 04/11-04/15 to [facility] for agitation. As per HHA, there has been no improvement in his agitation, he sundowns.  (15 May 2025 10:03)          History as above, asked to see this patient who was admitted 4 days ago from an assisted living facility with some diarrhea , nausea and vomiting and an episode of choking at the facility, he had no fevers. He has dementia and is confused currently and is able to answer limited questions, his HHA is at the bedside and tells me is confused at baseline. He was found to have Pneumonia here which is likely aspiration pneumonia as well colitis. He is being treated with Rocephin and Flagyl. He is c/o a cough with thick grey colored sputum which I saw myself. He does not appear SOB and is denying any chest pain, his diarrhea has also improved. He is having some low grade fevers over the last 24hrs.            PAST MEDICAL & SURGICAL HISTORY:  Dementia      Hypertension      Xerotic eczema      Cataract      S/P cataract surgery          No Known Allergies      Meds:  amLODIPine   Tablet 5 milliGRAM(s) Oral daily  cefTRIAXone   IVPB 1000 milliGRAM(s) IV Intermittent every 24 hours  cholecalciferol 1000 Unit(s) Oral daily  donepezil 10 milliGRAM(s) Oral at bedtime  heparin   Injectable 5000 Unit(s) SubCutaneous every 8 hours  melatonin 5 milliGRAM(s) Oral at bedtime  memantine 5 milliGRAM(s) Oral daily  metroNIDAZOLE  IVPB      metroNIDAZOLE  IVPB 500 milliGRAM(s) IV Intermittent every 12 hours  ondansetron Injectable 4 milliGRAM(s) IV Push every 8 hours PRN  pantoprazole    Tablet 40 milliGRAM(s) Oral before breakfast  QUEtiapine 50 milliGRAM(s) Oral daily  sodium chloride 0.9%. 1000 milliLiter(s) IV Continuous <Continuous>  valproic  acid Syrup 250 milliGRAM(s) Oral <User Schedule>      SOCIAL HISTORY:  Smoker:  no  ETOH use: no       FAMILY HISTORY: unknown      VITALS:  Vital Signs Last 24 Hrs  T(C): 36.8 (19 May 2025 13:49), Max: 38 (18 May 2025 21:55)  T(F): 98.3 (19 May 2025 13:49), Max: 100.4 (18 May 2025 21:55)  HR: 88 (19 May 2025 14:34) (88 - 103)  BP: 167/88 (19 May 2025 14:34) (154/82 - 181/79)  BP(mean): --  RR: 20 (19 May 2025 14:34) (20 - 22)  SpO2: 96% (19 May 2025 14:34) (96% - 98%)    Parameters below as of 19 May 2025 14:34  Patient On (Oxygen Delivery Method): nasal cannula  O2 Flow (L/min): 3      LABS/DIAGNOSTIC TESTS:                          12.0   9.27  )-----------( 173      ( 19 May 2025 07:13 )             36.6     WBC Count: 9.27 K/uL (05-19 @ 07:13)  WBC Count: 10.30 K/uL (05-18 @ 07:18)  WBC Count: 12.18 K/uL (05-17 @ 05:35)      05-19    141  |  109[H]  |  19[H]  ----------------------------<  132[H]  3.4[L]   |  25  |  0.85    Ca    8.9      19 May 2025 07:13        Urine Microscopic-Add On (NC) (05.15.25 @ 07:20)   White Blood Cell - Urine: 0 /HPF  Red Blood Cell - Urine: 0 /HPF  Bacteria: Few /HPF  Squamous Epithelial Cells: PresentUrinalysis with Rflx Culture (05.15.25 @ 07:20)   Urine Appearance: Clear  Color: Dark Yellow  Specific Gravity: 1.024  pH Urine: 5.5  Protein, Urine: 30 mg/dL  Glucose Qualitative, Urine: Negative mg/dL  Ketone , Urine: Trace mg/dL  Blood, Urine: Negative  Bilirubin: Negative  Urobilinogen: 1.0 mg/dL  Leukocyte Esterase Concentration: Negative  Nitrite: Negative              LACTATE:    ABG -     CULTURES:   Blood Blood-Peripheral  05-15 @ 06:40   No growth at 4 days  --  --      Blood Blood-Peripheral  05-15 @ 06:30   No growth at 4 days  --  --          RADIOLOGY:< from: CT Abdomen and Pelvis No Cont (05.15.25 @ 06:00) >    ACC: [id number] EXAM:  CT ABDOMEN AND PELVIS   ORDERED BY: WENDI SHANNON     ACC: [id number] EXAM:  CT CHEST   ORDERED BY: WENDI SHANNON     PROCEDURE DATE:  05/15/2025          INTERPRETATION:  CLINICAL INFORMATION: Hypoxia, nausea and diarrhea.    COMPARISON: None.    CONTRAST/COMPLICATIONS:  IV Contrast: None  Oral Contrast: None      PROCEDURE:  CT of the Chest, Abdomen and Pelvis was performed.  Sagittal and coronal reformats were performed.    FINDINGS:  Evaluation of solid organs and vascular structures is limited without   intravenous contrast. Exam is degraded by motion artifact.    CHEST:  LUNGS AND LARGE AIRWAYS: Evaluation degraded by motion artifact. Trace   secretions in the trachea. Bilateral groundglass nodular opacities with   superimposed areas of nodular consolidation. Mild bibasilar atelectasis.  PLEURA: No pleural effusion.  VESSELS: Aortic calcifications.  HEART: Heart size is normal. Trace pericardial effusion.  MEDIASTINUM AND LAY: No lymphadenopathy.  CHEST WALL AND LOWER NECK: 3 mm hypodense right thyroid nodule.    ABDOMEN AND PELVIS:  LIVER: Within normal limits.  BILE DUCTS: Normal caliber.  GALLBLADDER: Limited evaluation due to motion artifact.  SPLEEN: Grossly within normal limits. Limited evaluationof the superior   aspect due to motion artifact.  PANCREAS: Within normal limits.  ADRENALS: Within normal limits.  KIDNEYS/URETERS: No renal stones or hydronephrosis.    BLADDER: Mild wall thickening but underdistended.  REPRODUCTIVE ORGANS: Enlarged prostate.    BOWEL: No bowel obstruction. Appendix is normal. Mild wall thickening of   the cecum and ascending colon.  PERITONEUM/RETROPERITONEUM: Within normal limits.  VESSELS: Atherosclerotic changes.  LYMPH NODES: No lymphadenopathy.  ABDOMINALWALL: Small fat-containing umbilical hernia. Small bilateral   fat-containing inguinal hernias.  BONES: Degenerative changes.    IMPRESSION:  Exam limited by noncontrast technique and motion artifact.    Bilateral groundglass nodular opacities with superimposed areas of   nodular consolidation, likely infectious in etiology. Follow-up CT after   treatment is recommended to ensure resolution.    Mild wall thickening of the cecum and ascending colon could reflect   underdistention versus colitis. Clinical correlation recommended.      --- End of Report ---            RISHABH STACK MD; Attending Radiologist  This document has been electronically signed. May 15 2025  6:17AM    < end of copied text >        ROS  [  ] UNABLE TO ELICIT, limited secondary to dementia

## 2025-05-19 NOTE — PROGRESS NOTE ADULT - PROBLEM SELECTOR PLAN 4
CT findings appreciated  Continue IV ceftriaxone and Flagyl    Remainder of management as per primary medical team.

## 2025-05-19 NOTE — PROGRESS NOTE ADULT - PROBLEM SELECTOR PLAN 3
p/w 1 days of watery stool, nausea and vomiting  CTAP: Mild wall thickening of the cecum and ascending colon could reflect underdistention versus colitis.  -cont Ceftriaxone and flagyl  -send for GI PCR, stool Cx, ova and parasites if more loose stool

## 2025-05-19 NOTE — PROGRESS NOTE ADULT - PROBLEM SELECTOR PLAN 3
As above  Continue IV ceftriaxone and metronidazole  Remainder of management as per primary medical team.

## 2025-05-19 NOTE — PROGRESS NOTE ADULT - ASSESSMENT
84y/M, from [facility] assisted living, ambulates with walker, PMH of Dementia with behavioural issues, HTN, presented to ED with multiple episodes of loose bm with associated nausea and vomiting, and choking episode. Patient hypoxic to 90% on RA on arrival. Ct showing colitis and Bilateral groundglass nodular opacities with superimposed areas of nodular consolidation. Admitted for AHRF 2/2 aspiration pneumonia due to colitis.

## 2025-05-19 NOTE — CONSULT NOTE ADULT - ASSESSMENT
84y/M, from [facility] assisted living, ambulates with walker, PMHx of Dementia with behavioural issues, HTN, presented to ED with multiple episodes of loose bm with associated nausea and vomiting, and choking episode. Patient hypoxic to 90% on RA on arrival. Ct showing colitis and Bilateral groundglass nodular opacities with superimposed areas of nodular consolidation. Admitted for AHRF 2/2 aspiration pneumonia due to colitis.  SLP evaluation confirmed significant dysphagia.  Palliative Care consulted for additional GOC discussion
Pneumonia - likely aspiration  Colitis  Fever - low grade  Leukocytosis - normalized      Plan - Cont Rocephin 1gm iv q24hrs  Cont Flagyl 500mgs iv q12hrs  will monitor temps.

## 2025-05-19 NOTE — PROGRESS NOTE ADULT - PROBLEM SELECTOR PLAN 2
Continue with ceftriaxone  continue metronidazole  IgM mycoplasma noted, discussed with ID, this is unlikely to be culprit, patient was improving on ceftriaxone and metronidazole, will continue  Given recurrent fever likely due to recurrent aspiration, consult ID today

## 2025-05-19 NOTE — PROGRESS NOTE ADULT - SUBJECTIVE AND OBJECTIVE BOX
follow up on:  complex medical decision making related to goals of care    [facility] Palliative Consult Service:  Twila Stephenson DO: cell ([phone])  Ryan Maria MD: cell ([phone])  Lloyd Ko NP: cell ([phone])   Jessica Fleischer-Black MD:  cell ([phone])  Manpreet Landaverde LMSW: cell ([phone])     Can contact any Palliative Team member via Microsoft Teams for consults and questions      OVERNIGHT EVENTS:    Present Symptoms: Mild, Moderate, Severe  Pain:             Location -                               Aggravating factors -             Quality -             Radiation -             Timing-             Severity (0-10 scale):             Minimal acceptable level (0-10 scale):  Fatigue:  denies  Nausea:  denies  Lack of Appetite:  denies  SOB:  denies  Depression:  denies  Anxiety:  denies  Constipation:  denies      Review of Systems: All other systems reviewed and are negative or Unable to obtain due to poor mentation    CPOT:    https://ccs-st.org/resources/Documents/Sedation%20Analgesia%20Delirium%20in%20CC/CCS%20STH%20Guideline%20template%20CPOT.pdf  PAIN AD Score:   [email or web address]/painad.pdf (press ctrl +  left click to view)MEDICATIONS  (STANDING):  amLODIPine   Tablet 5 milliGRAM(s) Oral daily  cefTRIAXone   IVPB 1000 milliGRAM(s) IV Intermittent every 24 hours  cholecalciferol 1000 Unit(s) Oral daily  donepezil 10 milliGRAM(s) Oral at bedtime  heparin   Injectable 5000 Unit(s) SubCutaneous every 8 hours  melatonin 5 milliGRAM(s) Oral at bedtime  memantine 5 milliGRAM(s) Oral daily  metroNIDAZOLE  IVPB      metroNIDAZOLE  IVPB 500 milliGRAM(s) IV Intermittent every 12 hours  pantoprazole   Suspension 40 milliGRAM(s) Oral daily  QUEtiapine 50 milliGRAM(s) Oral daily  sodium chloride 0.9%. 1000 milliLiter(s) (100 mL/Hr) IV Continuous <Continuous>  valproic  acid Syrup 250 milliGRAM(s) Oral <User Schedule>    MEDICATIONS  (PRN):  ondansetron Injectable 4 milliGRAM(s) IV Push every 8 hours PRN Nausea and/or Vomiting      PHYSICAL EXAM:  Vital Signs Last 24 Hrs  T(C): 36.8 (19 May 2025 13:49), Max: 38 (18 May 2025 21:55)  T(F): 98.3 (19 May 2025 13:49), Max: 100.4 (18 May 2025 21:55)  HR: 88 (19 May 2025 14:34) (88 - 103)  BP: 167/88 (19 May 2025 14:34) (154/82 - 181/79)  BP(mean): --  RR: 20 (19 May 2025 14:34) (20 - 22)  SpO2: 96% (19 May 2025 14:34) (96% - 98%)    Parameters below as of 19 May 2025 14:34  Patient On (Oxygen Delivery Method): nasal cannula  O2 Flow (L/min): 3      General: alert  oriented x ____    lethargic distressed cachexia  verbal nonverbal  unarousable     Palliative Performance Scale/Karnofsky Score:  http://npcrc.org/files/news/palliative_performance_scale_ppsv2.pdf    HEENT:  EOMI anicteric, pharynx clear, MM moist  Lungs: tachypnea/labored breathing, clear to auscultation, no congestion noted  CV: RRR, tachycardic, normal S1 and S2, no murmur  GI: soft non distended non tender   + normal bowel sounds  : incontinent  oliguria/anuria  cunningham  Musculoskeletal: weakness x4  in all extremities, ambulatory with assistance   mostly/fully bedbound/wheelchair bound, no edema noted  Skin: no abnormal skin lesions or DU noted, poor skin turgor  Neuro: no deficits, mild cognitive impairment dsyphagia/dysarthria paresis  Oral intake ability: unable/only mouth care, minimal moderate full capability      LABS:                          12.0   9.27  )-----------( 173      ( 19 May 2025 07:13 )             36.6     05-19    141  |  109[H]  |  19[H]  ----------------------------<  132[H]  3.4[L]   |  25  |  0.85    Ca    8.9      19 May 2025 07:13      Urinalysis Basic - ( 19 May 2025 07:13 )    Color: x / Appearance: x / SG: x / pH: x  Gluc: 132 mg/dL / Ketone: x  / Bili: x / Urobili: x   Blood: x / Protein: x / Nitrite: x   Leuk Esterase: x / RBC: x / WBC x   Sq Epi: x / Non Sq Epi: x / Bacteria: x        RADIOLOGY & ADDITIONAL STUDIES: follow up on:  complex medical decision making related to goals of care    [facility] and Palliative Consult Service:  Twila Stephenson DO: cell ([phone])  Ryan Maria MD: cell ([phone])  Lloyd Ko NP: cell ([phone])   Jessica Fleischer-Black MD:  cell ([phone])  Manpreet Landaverde SW: cell ([phone])     Can contact any Palliative Team member via Microsoft Teams for consults and questions      OVERNIGHT EVENTS:  Patient with continued episodes of fever (Tmax 100.4 overnight) and wheezing, concerning for ongoing aspiration despite pureed diet, remains on IV ceftriaxone and Flagyl, formal ID consult pending.  Otherwise none.    Patient examined earlier this morning at bedside. Remains Alert and oriented x 1, somewhat less lethargic, calm, still easily conversant and responsive.  Denies overt pain, SOB, nausea, or vomiting.  No other acute complaints.      PT eval appreciated--recommending home PT back at [facility] pending clinical progress    Present Symptoms: Mild, Moderate, Severe  Pain:  Denies presently, 0/10  Fatigue:  denies  Nausea:  denies  Lack of Appetite:  denies  SOB:  denies  Depression:  denies  Anxiety:  mild  Constipation:  denies      Review of Systems: All other systems reviewed and are negative       MEDICATIONS  (STANDING):  amLODIPine   Tablet 5 milliGRAM(s) Oral daily  cefTRIAXone   IVPB 1000 milliGRAM(s) IV Intermittent every 24 hours  cholecalciferol 1000 Unit(s) Oral daily  donepezil 10 milliGRAM(s) Oral at bedtime  heparin   Injectable 5000 Unit(s) SubCutaneous every 8 hours  melatonin 5 milliGRAM(s) Oral at bedtime  memantine 5 milliGRAM(s) Oral daily  metroNIDAZOLE  IVPB      metroNIDAZOLE  IVPB 500 milliGRAM(s) IV Intermittent every 12 hours  pantoprazole   Suspension 40 milliGRAM(s) Oral daily  QUEtiapine 50 milliGRAM(s) Oral daily  sodium chloride 0.9%. 1000 milliLiter(s) (100 mL/Hr) IV Continuous <Continuous>  valproic  acid Syrup 250 milliGRAM(s) Oral <User Schedule>    MEDICATIONS  (PRN):  ondansetron Injectable 4 milliGRAM(s) IV Push every 8 hours PRN Nausea and/or Vomiting      PHYSICAL EXAM:  Vital Signs Last 24 Hrs  T(C): 36.8 (19 May 2025 13:49), Max: 38 (18 May 2025 21:55)  T(F): 98.3 (19 May 2025 13:49), Max: 100.4 (18 May 2025 21:55)  HR: 88 (19 May 2025 14:34) (88 - 103)  BP: 167/88 (19 May 2025 14:34) (154/82 - 181/79)  BP(mean): --  RR: 20 (19 May 2025 14:34) (20 - 22)  SpO2: 96% (19 May 2025 14:34) (96% - 98%)    Parameters below as of 19 May 2025 14:34  Patient On (Oxygen Delivery Method): nasal cannula  O2 Flow (L/min): 3      General: alert  oriented x __1__     calm but slightly anxious, + mild temporal wasting, in NAD      Palliative Performance Scale/Karnofsky Score:  40%  http://npcrc.org/files/news/palliative_performance_scale_ppsv2.pdf    HEENT:  EOMI anicteric, pharynx clear, MM moist  Lungs: mildly tachypneic, continued mild transmitted wheezing throughout (although sl improved from prior exam), respirations unlabored  CV: RRR, normal S1 and S2, no murmur  GI: soft non distended non tender   + normal bowel sounds  : urinating  Musculoskeletal: weakness x4  in all extremities, ambulatory with walker, no edema noted  Skin: no abnormal skin lesions or DU noted, + decreased skin turgor  Neuro: no focal deficits, + severe cognitive impairment   + dysphagia  Oral intake ability:  moderate  capability, on pureed diet with moderately thickened liquids        LABS:                          12.0   9.27  )-----------( 173      ( 19 May 2025 07:13 )             36.6     05-19    141  |  109[H]  |  19[H]  ----------------------------<  132[H]  3.4[L]   |  25  |  0.85    Ca    8.9      19 May 2025 07:13      Albumin: 2.6 g/dL (05.16.25 @ 05:26)      Urinalysis Basic - ( 19 May 2025 07:13 )    Color: x / Appearance: x / SG: x / pH: x  Gluc: 132 mg/dL / Ketone: x  / Bili: x / Urobili: x   Blood: x / Protein: x / Nitrite: x   Leuk Esterase: x / RBC: x / WBC x   Sq Epi: x / Non Sq Epi: x / Bacteria: x        RADIOLOGY & ADDITIONAL STUDIES:

## 2025-05-19 NOTE — PROGRESS NOTE ADULT - PROBLEM SELECTOR PLAN 3
No further episodes of diarrhea, no abdominal pain  COntinue ceftriaxone, after discussion with ID, continue metronidazole

## 2025-05-20 DIAGNOSIS — G31.83 NEUROCOGNITIVE DISORDER WITH LEWY BODIES: ICD-10-CM

## 2025-05-20 LAB
ANION GAP SERPL CALC-SCNC: 4 MMOL/L — LOW (ref 5–17)
BUN SERPL-MCNC: 18 MG/DL — SIGNIFICANT CHANGE UP (ref 7–18)
CALCIUM SERPL-MCNC: 9.1 MG/DL — SIGNIFICANT CHANGE UP (ref 8.4–10.5)
CHLORIDE SERPL-SCNC: 112 MMOL/L — HIGH (ref 96–108)
CO2 SERPL-SCNC: 27 MMOL/L — SIGNIFICANT CHANGE UP (ref 22–31)
CREAT SERPL-MCNC: 0.78 MG/DL — SIGNIFICANT CHANGE UP (ref 0.5–1.3)
CULTURE RESULTS: SIGNIFICANT CHANGE UP
CULTURE RESULTS: SIGNIFICANT CHANGE UP
EGFR: 89 ML/MIN/1.73M2 — SIGNIFICANT CHANGE UP
EGFR: 89 ML/MIN/1.73M2 — SIGNIFICANT CHANGE UP
GLUCOSE SERPL-MCNC: 112 MG/DL — HIGH (ref 70–99)
HCT VFR BLD CALC: 37.3 % — LOW (ref 39–50)
HGB BLD-MCNC: 12 G/DL — LOW (ref 13–17)
MCHC RBC-ENTMCNC: 29.3 PG — SIGNIFICANT CHANGE UP (ref 27–34)
MCHC RBC-ENTMCNC: 32.2 G/DL — SIGNIFICANT CHANGE UP (ref 32–36)
MCV RBC AUTO: 91.2 FL — SIGNIFICANT CHANGE UP (ref 80–100)
NRBC BLD AUTO-RTO: 0 /100 WBCS — SIGNIFICANT CHANGE UP (ref 0–0)
PLATELET # BLD AUTO: 202 K/UL — SIGNIFICANT CHANGE UP (ref 150–400)
POTASSIUM SERPL-MCNC: 3.7 MMOL/L — SIGNIFICANT CHANGE UP (ref 3.5–5.3)
POTASSIUM SERPL-SCNC: 3.7 MMOL/L — SIGNIFICANT CHANGE UP (ref 3.5–5.3)
RBC # BLD: 4.09 M/UL — LOW (ref 4.2–5.8)
RBC # FLD: 14.3 % — SIGNIFICANT CHANGE UP (ref 10.3–14.5)
SODIUM SERPL-SCNC: 143 MMOL/L — SIGNIFICANT CHANGE UP (ref 135–145)
SPECIMEN SOURCE: SIGNIFICANT CHANGE UP
SPECIMEN SOURCE: SIGNIFICANT CHANGE UP
WBC # BLD: 10.14 K/UL — SIGNIFICANT CHANGE UP (ref 3.8–10.5)
WBC # FLD AUTO: 10.14 K/UL — SIGNIFICANT CHANGE UP (ref 3.8–10.5)

## 2025-05-20 PROCEDURE — 99233 SBSQ HOSP IP/OBS HIGH 50: CPT

## 2025-05-20 PROCEDURE — 99232 SBSQ HOSP IP/OBS MODERATE 35: CPT

## 2025-05-20 RX ORDER — QUETIAPINE FUMARATE 25 MG/1
25 TABLET ORAL EVERY 8 HOURS
Refills: 0 | Status: DISCONTINUED | OUTPATIENT
Start: 2025-05-20 | End: 2025-05-23

## 2025-05-20 RX ORDER — CEFTRIAXONE 500 MG/1
1000 INJECTION, POWDER, FOR SOLUTION INTRAMUSCULAR; INTRAVENOUS EVERY 24 HOURS
Refills: 0 | Status: DISCONTINUED | OUTPATIENT
Start: 2025-05-21 | End: 2025-05-21

## 2025-05-20 RX ADMIN — AMLODIPINE BESYLATE 5 MILLIGRAM(S): 10 TABLET ORAL at 05:51

## 2025-05-20 RX ADMIN — CEFTRIAXONE 100 MILLIGRAM(S): 500 INJECTION, POWDER, FOR SOLUTION INTRAMUSCULAR; INTRAVENOUS at 05:51

## 2025-05-20 RX ADMIN — HEPARIN SODIUM 5000 UNIT(S): 1000 INJECTION INTRAVENOUS; SUBCUTANEOUS at 05:51

## 2025-05-20 RX ADMIN — Medication 250 MILLIGRAM(S): at 17:46

## 2025-05-20 RX ADMIN — HEPARIN SODIUM 5000 UNIT(S): 1000 INJECTION INTRAVENOUS; SUBCUTANEOUS at 21:49

## 2025-05-20 RX ADMIN — QUETIAPINE FUMARATE 50 MILLIGRAM(S): 25 TABLET ORAL at 11:33

## 2025-05-20 RX ADMIN — HEPARIN SODIUM 5000 UNIT(S): 1000 INJECTION INTRAVENOUS; SUBCUTANEOUS at 13:42

## 2025-05-20 RX ADMIN — Medication 100 MILLIGRAM(S): at 06:26

## 2025-05-20 RX ADMIN — MEMANTINE HYDROCHLORIDE 5 MILLIGRAM(S): 21 CAPSULE, EXTENDED RELEASE ORAL at 11:33

## 2025-05-20 RX ADMIN — DONEPEZIL HYDROCHLORIDE 10 MILLIGRAM(S): 5 TABLET ORAL at 21:49

## 2025-05-20 RX ADMIN — Medication 1000 UNIT(S): at 11:33

## 2025-05-20 RX ADMIN — Medication 5 MILLIGRAM(S): at 21:49

## 2025-05-20 RX ADMIN — Medication 40 MILLIGRAM(S): at 11:35

## 2025-05-20 RX ADMIN — Medication 250 MILLIGRAM(S): at 08:23

## 2025-05-20 RX ADMIN — Medication 100 MILLIGRAM(S): at 17:47

## 2025-05-20 RX ADMIN — Medication 250 MILLIGRAM(S): at 13:42

## 2025-05-20 NOTE — DIETITIAN INITIAL EVALUATION ADULT - PERTINENT LABORATORY DATA
05-20    143  |  112[H]  |  18  ----------------------------<  112[H]  3.7   |  27  |  0.78    Ca    9.1      20 May 2025 07:16

## 2025-05-20 NOTE — DIETITIAN INITIAL EVALUATION ADULT - PROBLEM SELECTOR PLAN 3
p/w 1 days of watery stool, nausea and vomiting  CTAP: Mild wall thickening of the cecum and ascending colon could reflect underdistention versus colitis.  -started Ceftriaxone and flagyl  -send for GI PCR, stool Cx, ova and parasites if more loose stool

## 2025-05-20 NOTE — DIETITIAN INITIAL EVALUATION ADULT - OTHER INFO
Pt visited. Pt is Alert and Verbal but confused. Pt Reports he is 7 feet. Observed Lunch meal untouched. D/W RN  Pt eats ~25 % Of  meal. No Diarrhea  Reported. NFPE Performed. WEIGHT Per HIE-   188 lb 5/15/2025, 182 LB 4/11/2025, 12/21/2024-189.6 lb, Ht 6 Feet. . Labs Noted. Will Suggest Magic cups  TID

## 2025-05-20 NOTE — PROGRESS NOTE ADULT - ASSESSMENT
84y/M, [facility], ambulates with walker, PMHx of Dementia with behavioural issues, HTN, presented to ED with multiple episodes of loose bm with associated nausea and vomiting, and choking episode. Patient hypoxic to 90% on RA on arrival. Ct showing colitis and Bilateral groundglass nodular opacities with superimposed areas of nodular consolidation. Admitted for AHRF 2/2 aspiration pneumonia due to colitis.  SLP evaluation confirmed significant dysphagia.  Palliative Care consulted for additional GOC discussion

## 2025-05-20 NOTE — DIETITIAN INITIAL EVALUATION ADULT - PERTINENT MEDS FT
MEDICATIONS  (STANDING):  amLODIPine   Tablet 5 milliGRAM(s) Oral daily  cholecalciferol 1000 Unit(s) Oral daily  donepezil 10 milliGRAM(s) Oral at bedtime  heparin   Injectable 5000 Unit(s) SubCutaneous every 8 hours  melatonin 5 milliGRAM(s) Oral at bedtime  memantine 5 milliGRAM(s) Oral daily  metroNIDAZOLE  IVPB      metroNIDAZOLE  IVPB 500 milliGRAM(s) IV Intermittent every 12 hours  pantoprazole   Suspension 40 milliGRAM(s) Oral daily  QUEtiapine 50 milliGRAM(s) Oral daily  sodium chloride 0.9%. 1000 milliLiter(s) (100 mL/Hr) IV Continuous <Continuous>  valproic  acid Syrup 250 milliGRAM(s) Oral <User Schedule>    MEDICATIONS  (PRN):  ondansetron Injectable 4 milliGRAM(s) IV Push every 8 hours PRN Nausea and/or Vomiting  QUEtiapine 25 milliGRAM(s) Oral every 8 hours PRN Agitation

## 2025-05-20 NOTE — PROGRESS NOTE ADULT - PROBLEM SELECTOR PLAN 1
History of Lewy Body dementia with paranoia and other behavioral issues, with multiple hospitalizations for agitation in the past.    By clinical assessment, patient is mid FAST stage 6, but FAST staging is much less predictive in setting of Lewy Body disease.  Patient now presenting with worsening agitation and confusion over the last month, as well as progessive dysphagia.  Development of clinically significant aspiration with pneumonia in patient with LBD is a poor prognostic sign.  Patient is at substantially increased risk of further morbidity, recurrent hospitalizations, and decline towards death. In setting of advanced Lewy Body dementia, patient is hospice appropriate with general prognosis in the range of 6 to 12 months     See GOC discussion from 5/19--son counseled and given anticipatory guidance regarding prognosis and expected disease trajectory, understands that patient has advanced/progressive disease, now in agreement with patient returning to [facility] with hospice services, will refer to [facility] SW.  DNR/DNI in place    Continue home donepezil, valproic acid, and quetiapine  Memantine added by primary team at son's request, though I am doubtful it will have any efficacy  For additional breakthrough agitation, would recommend PRN doses of Seroquel or IM Zyprexa if needed  Continue supportive care.

## 2025-05-20 NOTE — PROGRESS NOTE ADULT - SUBJECTIVE AND OBJECTIVE BOX
follow up on:  complex medical decision making related to goals of care    [facility] Geriatric and Palliative Consult Service:  Twila Stephenson DO: cell ([phone])  Ryan Maria MD: cell ([phone])  Lloyd Ko NP: cell ([phone])   Jessica Fleischer-Black MD:  cell ([phone])  Manpreet Landaverde LMSW: cell ([phone])     Can contact any Palliative Team member via Microsoft Teams for consults and questions      OVERNIGHT EVENTS:    Present Symptoms: Mild, Moderate, Severe  Pain:             Location -                               Aggravating factors -             Quality -             Radiation -             Timing-             Severity (0-10 scale):             Minimal acceptable level (0-10 scale):  Fatigue:  denies  Nausea:  denies  Lack of Appetite:  denies  SOB:  denies  Depression:  denies  Anxiety:  denies  Constipation:  denies      Review of Systems: All other systems reviewed and are negative or Unable to obtain due to poor mentation    CPOT:    https://ccs-st.org/resources/Documents/Sedation%20Analgesia%20Delirium%20in%20CC/CCS%20STH%20Guideline%20template%20CPOT.pdf  PAIN AD Score:   [email or web address]/[email or web address] (press ctrl +  left click to view)MEDICATIONS  (STANDING):  amLODIPine   Tablet 5 milliGRAM(s) Oral daily  cholecalciferol 1000 Unit(s) Oral daily  donepezil 10 milliGRAM(s) Oral at bedtime  heparin   Injectable 5000 Unit(s) SubCutaneous every 8 hours  melatonin 5 milliGRAM(s) Oral at bedtime  memantine 5 milliGRAM(s) Oral daily  metroNIDAZOLE  IVPB      metroNIDAZOLE  IVPB 500 milliGRAM(s) IV Intermittent every 12 hours  pantoprazole   Suspension 40 milliGRAM(s) Oral daily  QUEtiapine 50 milliGRAM(s) Oral daily  sodium chloride 0.9%. 1000 milliLiter(s) (100 mL/Hr) IV Continuous <Continuous>  valproic  acid Syrup 250 milliGRAM(s) Oral <User Schedule>    MEDICATIONS  (PRN):  ondansetron Injectable 4 milliGRAM(s) IV Push every 8 hours PRN Nausea and/or Vomiting  QUEtiapine 25 milliGRAM(s) Oral every 8 hours PRN Agitation      PHYSICAL EXAM:  Vital Signs Last 24 Hrs  T(C): 37 (20 May 2025 20:37), Max: 37.2 (20 May 2025 05:25)  T(F): 98.6 (20 May 2025 20:37), Max: 99 (20 May 2025 05:25)  HR: 90 (20 May 2025 20:37) (84 - 90)  BP: 161/82 (20 May 2025 20:37) (161/82 - 164/82)  BP(mean): 100 (20 May 2025 05:25) (100 - 100)  RR: 18 (20 May 2025 20:37) (17 - 19)  SpO2: 94% (20 May 2025 20:37) (94% - 100%)    Parameters below as of 20 May 2025 20:37  Patient On (Oxygen Delivery Method): room air        General: alert  oriented x ____    lethargic distressed cachexia  verbal nonverbal  unarousable     Palliative Performance Scale/Karnofsky Score:  http://npcrc.org/files/news/palliative_performance_scale_ppsv2.pdf    HEENT:  EOMI anicteric, pharynx clear, MM moist  Lungs: tachypnea/labored breathing, clear to auscultation, no congestion noted  CV: RRR, tachycardic, normal S1 and S2, no murmur  GI: soft non distended non tender   + normal bowel sounds  : incontinent  oliguria/anuria  cunningham  Musculoskeletal: weakness x4  in all extremities, ambulatory with assistance   mostly/fully bedbound/wheelchair bound, no edema noted  Skin: no abnormal skin lesions or DU noted, poor skin turgor  Neuro: no deficits, mild cognitive impairment dsyphagia/dysarthria paresis  Oral intake ability: unable/only mouth care, minimal moderate full capability      LABS:                          12.0   10.14 )-----------( 202      ( 20 May 2025 07:16 )             37.3     05-20    143  |  112[H]  |  18  ----------------------------<  112[H]  3.7   |  27  |  0.78    Ca    9.1      20 May 2025 07:16      Urinalysis Basic - ( 20 May 2025 07:16 )    Color: x / Appearance: x / SG: x / pH: x  Gluc: 112 mg/dL / Ketone: x  / Bili: x / Urobili: x   Blood: x / Protein: x / Nitrite: x   Leuk Esterase: x / RBC: x / WBC x   Sq Epi: x / Non Sq Epi: x / Bacteria: x        RADIOLOGY & ADDITIONAL STUDIES: follow up on:  complex medical decision making related to goals of care    [facility] Geriatric and Palliative Consult Service:  Twila Stephenson DO: cell ([phone])  Ryan Maria MD: cell ([phone])  Lloyd Ko NP: cell ([phone])   Jessica Fleischer-Black MD:  cell ([phone])  Manpreet Landaverde LMSW: cell ([phone])     Can contact any Palliative Team member via Microsoft Teams for consults and questions      OVERNIGHT EVENTS:  None.    Patient examined this afternoon with private HHA at bedside.  Alert and oriented x 1, appears more comfortable, breathing improved today.  Denies pain, SOB, nausea, or vomiting.  No other acute complaints.     Per discussion with SW, son now in agreement for patient returning to [facility] with hospice services.      Present Symptoms: Mild, Moderate, Severe  Pain:  Denies presently, 0/10  Fatigue:  mild to moderate  Nausea:  denies  Lack of Appetite:  denies  SOB:  denies  Anxiety:  denies  Constipation:  denies      Review of Systems: All other systems reviewed and are negative       MEDICATIONS  (STANDING):  amLODIPine   Tablet 5 milliGRAM(s) Oral daily  cholecalciferol 1000 Unit(s) Oral daily  donepezil 10 milliGRAM(s) Oral at bedtime  heparin   Injectable 5000 Unit(s) SubCutaneous every 8 hours  melatonin 5 milliGRAM(s) Oral at bedtime  memantine 5 milliGRAM(s) Oral daily  metroNIDAZOLE  IVPB      metroNIDAZOLE  IVPB 500 milliGRAM(s) IV Intermittent every 12 hours  pantoprazole   Suspension 40 milliGRAM(s) Oral daily  QUEtiapine 50 milliGRAM(s) Oral daily  sodium chloride 0.9%. 1000 milliLiter(s) (100 mL/Hr) IV Continuous <Continuous>  valproic  acid Syrup 250 milliGRAM(s) Oral <User Schedule>    MEDICATIONS  (PRN):  ondansetron Injectable 4 milliGRAM(s) IV Push every 8 hours PRN Nausea and/or Vomiting  QUEtiapine 25 milliGRAM(s) Oral every 8 hours PRN Agitation      PHYSICAL EXAM:  Vital Signs Last 24 Hrs  T(C): 37 (20 May 2025 20:37), Max: 37.2 (20 May 2025 05:25)  T(F): 98.6 (20 May 2025 20:37), Max: 99 (20 May 2025 05:25)  HR: 90 (20 May 2025 20:37) (84 - 90)  BP: 161/82 (20 May 2025 20:37) (161/82 - 164/82)  BP(mean): 100 (20 May 2025 05:25) (100 - 100)  RR: 18 (20 May 2025 20:37) (17 - 19)  SpO2: 94% (20 May 2025 20:37) (94% - 100%)    Parameters below as of 20 May 2025 20:37  Patient On (Oxygen Delivery Method): room air        General: alert  oriented x __1__    appears less anxious,  + mild temporal wasting, in NAD    Palliative Performance Scale/Karnofsky Score:  40%  http://npcrc.org/files/news/palliative_performance_scale_ppsv2.pdf    HEENT:  EOMI anicteric, pharynx clear, MM moist  Lungs: mildly tachypneic, continued mild transmitted wheezing throughout (although sl improved from prior exam), respirations unlabored  CV: RRR, normal S1 and S2, no murmur  GI: soft non distended non tender   + normal bowel sounds  : urinating  Musculoskeletal: weakness x4  in all extremities, ambulatory with walker, no edema noted  Skin: no abnormal skin lesions or DU noted, + decreased skin turgor  Neuro: no focal deficits, + severe cognitive impairment   + dysphagia  Oral intake ability:  moderate  capability, on pureed diet with moderately thickened liquids        LABS:                          12.0   10.14 )-----------( 202      ( 20 May 2025 07:16 )             37.3     05-20    143  |  112[H]  |  18  ----------------------------<  112[H]  3.7   |  27  |  0.78    Ca    9.1      20 May 2025 07:16        Albumin: 2.6 g/dL (05.16.25 @ 05:26)      Urinalysis Basic - ( 20 May 2025 07:16 )    Color: x / Appearance: x / SG: x / pH: x  Gluc: 112 mg/dL / Ketone: x  / Bili: x / Urobili: x   Blood: x / Protein: x / Nitrite: x   Leuk Esterase: x / RBC: x / WBC x   Sq Epi: x / Non Sq Epi: x / Bacteria: x        RADIOLOGY & ADDITIONAL STUDIES:

## 2025-05-20 NOTE — PROGRESS NOTE ADULT - PROBLEM SELECTOR PLAN 7
As above.  83 yo male with advanced Lewy Body dementia with behavioral disturbances, mid FAST stage 6 by history, hx of multiple hospitalizations for worsening agitation, now admitted for AHRF 2/2 aspiration pneumonia, colitis, and FOSTER, with evidence of progressive dysphagia.  Substantially increased risk of further morbidity and mortality over the next 12 months, and FAST staging less predictive in this case  Onset of worsening dysphagia with clinically significant aspiration/aspiration pneumonia is poor prognostic sign in patients with advanced Lewy Body dementia.  Patient  is hospice appropriate with general prognosis of 6 to 12 months or less    See GOC discussion from 5/19--son given  anticipatory guidance and counseling regarding expected disease trajectory, amenable to exploring hospice services, medical team and [facility] Care SW aware.  Also given pre-emptive counseling about potential future PEG vs comfort feeds, no final decision at this time.    5/20--son in formal agreement for return to [facility] with hospice services    Otherwise continue management as per primary medical team  [facility] DNR/DNI orders now in place  Discussed with medical team and Dr. Barrientos in detail  Palliative Care team will continue to follow.

## 2025-05-20 NOTE — PROGRESS NOTE ADULT - SUBJECTIVE AND OBJECTIVE BOX
Patient is a 84y old  Male who presents with a chief complaint of AHRF 2/2 ?aspiration pneumonia and colitis (19 May 2025 17:35)    Patient was seen and examined at bedside   TATE Szymanski at bedside, reports patient is more alert today, wanting to go home   Patient is alert and responsive, has hearing impairement. Unable to provide any context of hospital admission but denies any cough, chest pain or SOB. Able to follow simple commands     INTERVAL HPI/OVERNIGHT EVENTS:  T(C): 37 (05-20-25 @ 11:58), Max: 37.3 (05-19-25 @ 20:16)  HR: 85 (05-20-25 @ 11:58) (84 - 89)  BP: 161/82 (05-20-25 @ 11:58) (161/82 - 171/81)  RR: 18 (05-20-25 @ 11:58) (18 - 20)  SpO2: 100% (05-20-25 @ 11:58) (96% - 100%)  Wt(kg): --  I&O's Summary      REVIEW OF SYSTEMS: denies fever, SOB, palpitations, chest pain, abdominal pain, nausea, does not answer some of the questions     MEDICATIONS  (STANDING):  amLODIPine   Tablet 5 milliGRAM(s) Oral daily  cholecalciferol 1000 Unit(s) Oral daily  donepezil 10 milliGRAM(s) Oral at bedtime  heparin   Injectable 5000 Unit(s) SubCutaneous every 8 hours  melatonin 5 milliGRAM(s) Oral at bedtime  memantine 5 milliGRAM(s) Oral daily  metroNIDAZOLE  IVPB      metroNIDAZOLE  IVPB 500 milliGRAM(s) IV Intermittent every 12 hours  pantoprazole   Suspension 40 milliGRAM(s) Oral daily  QUEtiapine 50 milliGRAM(s) Oral daily  sodium chloride 0.9%. 1000 milliLiter(s) (100 mL/Hr) IV Continuous <Continuous>  valproic  acid Syrup 250 milliGRAM(s) Oral <User Schedule>    MEDICATIONS  (PRN):  ondansetron Injectable 4 milliGRAM(s) IV Push every 8 hours PRN Nausea and/or Vomiting      PHYSICAL EXAM:  GENERAL: NAD  HEAD:  Atraumatic, Normocephalic  NERVOUS SYSTEM:  Alert & Oriented X1, moving all 4 extremities, but does not follow all commands to complete full neuro exam   CHEST/LUNG: Clear to auscultation anterolaterally  HEART: Regular rate and rhythm; No murmurs, rubs, or gallops  ABDOMEN: Soft, Nontender, Nondistended; Bowel sounds present  EXTREMITIES:  2+ Peripheral Pulses, No clubbing, cyanosis, or edema  SKIN: No rashes or lesions      LABS:                        12.0   10.14 )-----------( 202      ( 20 May 2025 07:16 )             37.3     05-20    143  |  112[H]  |  18  ----------------------------<  112[H]  3.7   |  27  |  0.78    Ca    9.1      20 May 2025 07:16        Urinalysis Basic - ( 20 May 2025 07:16 )    Color: x / Appearance: x / SG: x / pH: x  Gluc: 112 mg/dL / Ketone: x  / Bili: x / Urobili: x   Blood: x / Protein: x / Nitrite: x   Leuk Esterase: x / RBC: x / WBC x   Sq Epi: x / Non Sq Epi: x / Bacteria: x      CAPILLARY BLOOD GLUCOSE      CTAP: Mild wall thickening of the cecum and ascending colon could reflect underdistention versus colitis.

## 2025-05-20 NOTE — DIETITIAN INITIAL EVALUATION ADULT - PROBLEM SELECTOR PLAN 1
p/w sob, cough and a choking spell  CT Chest shows Trace secretions in the trachea. Bilateral groundglass nodular opacities with superimposed areas of nodular consolidation. Mild bibasilar atelectasis.  AHRF 2/2 aspiration pneumonia    -Started  rocephin 1g qd  -f/u strep ag, legionella, procalcitonin, mycoplasma igm  -NPO except meds  -aspiration precaution  -f/u Speech and swallow eval  -O2 supplementation, wean off as tolerated

## 2025-05-20 NOTE — PROGRESS NOTE ADULT - ASSESSMENT
Acute hypoxic respiratory failure due to aspiration pneumonia   HTN  H/o Lewy body dementia with behavioral disturbance   Colitis     Plan:   Taper down NC as tolerated  Cont Ceftriaxone and Metronidazole to cover pneumonia and colitis   lactate normalized   Cont aspiration precaution   Speech and swallow eval  Renal function stable   Cont Amlodipine for HTN   Cont Seroquel 50mg decreased from home dose 100mg) and Valproic acid. Mental status close to baseline today as per HHA at bedside. Will add Seroquel 25mg TID PRN for agitation  Cont Memantine   Palliative care consult appreciated, DNR/ DNI   PT consult for dc planning   Plan of care was discussed with patient's primary care giver; all questions and concerns were addressed . We discussed about prognosis of LBD and continued aspiration risk. Son Mr. Regino Jalloh is very appreciative of palliative care consult and agrees that it is time to start Hospice care. He spoke with Palliative [facility] and looking forward for possible transfer to Assisted living with hospice care with 24x7 private pay A   Acute hypoxic respiratory failure due to aspiration pneumonia   HTN  H/o Lewy body dementia with behavioral disturbance   Colitis     Plan:   Taper down NC as tolerated  Cont Ceftriaxone and Metronidazole to cover pneumonia and colitis   lactate normalized   Cont aspiration precaution   Speech and swallow eval  Renal function stable   Cont Amlodipine for HTN   Cont Seroquel 50mg decreased from home dose 100mg) and Valproic acid. Mental status close to baseline today as per HHA at bedside. Will add Seroquel 25mg TID PRN for agitation  Cont Memantine   Palliative care consult appreciated, DNR/ DNI   PT consult for dc planning   Plan of care was discussed with patient's primary care giver; all questions and concerns were addressed . We discussed about prognosis of LBD and continued aspiration risk. Son Mr. Regino Jalloh is very appreciative of palliative care consult, agrees with DNR/ DNI  and agrees that it is time to start Hospice care. He spoke with Palliative [facility] and looking forward for possible transfer to Assisted living with hospice care with 24x7 private pay [facility]

## 2025-05-20 NOTE — PROGRESS NOTE ADULT - SUBJECTIVE AND OBJECTIVE BOX
Patient is a 84y old  Male who presents with a chief complaint of AHRF 2/2 ?aspiration pneumonia and colitis (19 May 2025 17:35)      INTERVAL HPI/OVERNIGHT EVENTS:  no acute events overnight       REVIEW OF SYSTEMS:  CONSTITUTIONAL: No fever, chills  ENMT:  No difficulty hearing, no change in vision  NECK: No pain or stiffness  RESPIRATORY: No cough, SOB  CARDIOVASCULAR: No chest pain, palpitations  GASTROINTESTINAL: No abdominal pain. No nausea, vomiting, or diarrhea  GENITOURINARY: No dysuria  NEUROLOGICAL: No HA  SKIN: No itching, burning, rashes, or lesions   LYMPH NODES: No enlarged glands  ENDOCRINE: No heat or cold intolerance; No hair loss  MUSCULOSKELETAL: No joint pain or swelling; No muscle, back, or extremity pain  PSYCHIATRIC: No depression, anxiety  HEME/LYMPH: No easy bruising, or bleeding gums    T(C): 37 (05-20-25 @ 11:58), Max: 37.3 (05-19-25 @ 20:16)  HR: 85 (05-20-25 @ 11:58) (84 - 89)  BP: 161/82 (05-20-25 @ 11:58) (161/82 - 171/81)  RR: 18 (05-20-25 @ 11:58) (18 - 20)  SpO2: 100% (05-20-25 @ 11:58) (96% - 100%)  Wt(kg): --Vital Signs Last 24 Hrs  T(C): 37 (20 May 2025 11:58), Max: 37.3 (19 May 2025 20:16)  T(F): 98.6 (20 May 2025 11:58), Max: 99.2 (19 May 2025 20:16)  HR: 85 (20 May 2025 11:58) (84 - 89)  BP: 161/82 (20 May 2025 11:58) (161/82 - 171/81)  BP(mean): 100 (20 May 2025 05:25) (100 - 100)  RR: 18 (20 May 2025 11:58) (18 - 20)  SpO2: 100% (20 May 2025 11:58) (96% - 100%)    Parameters below as of 20 May 2025 11:58  Patient On (Oxygen Delivery Method): nasal cannula    MEDICATIONS  (STANDING):  amLODIPine   Tablet 5 milliGRAM(s) Oral daily  cholecalciferol 1000 Unit(s) Oral daily  donepezil 10 milliGRAM(s) Oral at bedtime  heparin   Injectable 5000 Unit(s) SubCutaneous every 8 hours  melatonin 5 milliGRAM(s) Oral at bedtime  memantine 5 milliGRAM(s) Oral daily  metroNIDAZOLE  IVPB      metroNIDAZOLE  IVPB 500 milliGRAM(s) IV Intermittent every 12 hours  pantoprazole   Suspension 40 milliGRAM(s) Oral daily  QUEtiapine 50 milliGRAM(s) Oral daily  sodium chloride 0.9%. 1000 milliLiter(s) (100 mL/Hr) IV Continuous <Continuous>  valproic  acid Syrup 250 milliGRAM(s) Oral <User Schedule>    MEDICATIONS  (PRN):  ondansetron Injectable 4 milliGRAM(s) IV Push every 8 hours PRN Nausea and/or Vomiting  QUEtiapine 25 milliGRAM(s) Oral every 8 hours PRN Agitation      PHYSICAL EXAM:  GENERAL: NAD  EYES: clear conjunctiva; EOMI  ENMT: Moist mucous membranes  NECK: Supple, No JVD, Normal thyroid  CHEST/LUNG: Clear to auscultation bilaterally; No rales, rhonchi, wheezing, or rubs  HEART: S1, S2, Regular rate and rhythm  ABDOMEN: Soft, Nontender, Nondistended; Bowel sounds present  NEURO: Alert & awake   EXTREMITIES: No LE edema, no calf tenderness  LYMPH: No lymphadenopathy noted  SKIN: No rashes or lesions    Consultant(s) Notes Reviewed:  [x ] YES  [ ] NO  Care Discussed with Consultants/Other Providers [ x] YES  [ ] NO    LABS:                        12.0   10.14 )-----------( 202      ( 20 May 2025 07:16 )             37.3     05-20    143  |  112[H]  |  18  ----------------------------<  112[H]  3.7   |  27  |  0.78    Ca    9.1      20 May 2025 07:16        CAPILLARY BLOOD GLUCOSE            Urinalysis Basic - ( 20 May 2025 07:16 )    Color: x / Appearance: x / SG: x / pH: x  Gluc: 112 mg/dL / Ketone: x  / Bili: x / Urobili: x   Blood: x / Protein: x / Nitrite: x   Leuk Esterase: x / RBC: x / WBC x   Sq Epi: x / Non Sq Epi: x / Bacteria: x        RADIOLOGY & ADDITIONAL TESTS:    Imaging Personally Reviewed:  [x ] YES  [ ] NO    < from: CT Abdomen and Pelvis No Cont (05.15.25 @ 06:00) >    ACC: [id number] EXAM:  CT ABDOMEN AND PELVIS   ORDERED BY: WENDI SHANNON     ACC: [id number] EXAM:  CT CHEST   ORDERED BY: WENDI SHANNON     PROCEDURE DATE:  05/15/2025          INTERPRETATION:  CLINICAL INFORMATION: Hypoxia, nausea and diarrhea.    COMPARISON: None.    CONTRAST/COMPLICATIONS:  IV Contrast: None  Oral Contrast: None      PROCEDURE:  CT of the Chest, Abdomen and Pelvis was performed.  Sagittal and coronal reformats were performed.    FINDINGS:  Evaluation of solid organs and vascular structures is limited without   intravenous contrast. Exam is degraded by motion artifact.    CHEST:  LUNGS AND LARGE AIRWAYS: Evaluation degraded by motion artifact. Trace   secretions in the trachea. Bilateral groundglass nodular opacities with   superimposed areas of nodular consolidation. Mild bibasilar atelectasis.  PLEURA: No pleural effusion.  VESSELS: Aortic calcifications.  HEART: Heart size is normal. Trace pericardial effusion.  MEDIASTINUM AND LAY: No lymphadenopathy.  CHEST WALL AND LOWER NECK: 3 mm hypodense right thyroid nodule.    ABDOMEN AND PELVIS:  LIVER: Within normal limits.  BILE DUCTS: Normal caliber.  GALLBLADDER: Limited evaluation due to motion artifact.  SPLEEN: Grossly within normal limits. Limited evaluationof the superior   aspect due to motion artifact.  PANCREAS: Within normal limits.  ADRENALS: Within normal limits.  KIDNEYS/URETERS: No renal stones or hydronephrosis.    BLADDER: Mild wall thickening but underdistended.  REPRODUCTIVE ORGANS: Enlarged prostate.    BOWEL: No bowel obstruction. Appendix is normal. Mild wall thickening of   the cecum and ascending colon.  PERITONEUM/RETROPERITONEUM: Within normal limits.  VESSELS: Atherosclerotic changes.  LYMPH NODES: No lymphadenopathy.  ABDOMINALWALL: Small fat-containing umbilical hernia. Small bilateral   fat-containing inguinal hernias.  BONES: Degenerative changes.    IMPRESSION:  Exam limited by noncontrast technique and motion artifact.    Bilateral groundglass nodular opacities with superimposed areas of   nodular consolidation, likely infectious in etiology. Follow-up CT after   treatment is recommended to ensure resolution.    Mild wall thickening of the cecum and ascending colon could reflect   underdistention versus colitis. Clinical correlation recommended.      --- End of Report ---            RISHABH STACK MD; Attending Radiologist  This document has been electronically signed. May 15 2025  6:17AM    < end of copied text >

## 2025-05-20 NOTE — PROGRESS NOTE ADULT - PROBLEM SELECTOR PLAN 1
p/w sob, cough and a choking spell  CT Chest shows Trace secretions in the trachea. Bilateral groundglass nodular opacities with superimposed areas of nodular consolidation. Mild bibasilar atelectasis.  -2/2 aspiration pneumonia  -C/w CTX and flagyl   -aspiration precaution  -Speech and swallow eval- recommended puree with moderately thick liquid but continue to cough when eating  -Will ask S/S to re-eval pt.   -O2 supplementation, wean off as tolerated

## 2025-05-21 LAB
ANION GAP SERPL CALC-SCNC: 7 MMOL/L — SIGNIFICANT CHANGE UP (ref 5–17)
BUN SERPL-MCNC: 22 MG/DL — HIGH (ref 7–18)
CALCIUM SERPL-MCNC: 9.1 MG/DL — SIGNIFICANT CHANGE UP (ref 8.4–10.5)
CHLORIDE SERPL-SCNC: 112 MMOL/L — HIGH (ref 96–108)
CO2 SERPL-SCNC: 25 MMOL/L — SIGNIFICANT CHANGE UP (ref 22–31)
CREAT SERPL-MCNC: 0.73 MG/DL — SIGNIFICANT CHANGE UP (ref 0.5–1.3)
EGFR: 91 ML/MIN/1.73M2 — SIGNIFICANT CHANGE UP
EGFR: 91 ML/MIN/1.73M2 — SIGNIFICANT CHANGE UP
GLUCOSE SERPL-MCNC: 111 MG/DL — HIGH (ref 70–99)
HCT VFR BLD CALC: 37 % — LOW (ref 39–50)
HGB BLD-MCNC: 12.3 G/DL — LOW (ref 13–17)
MCHC RBC-ENTMCNC: 29.9 PG — SIGNIFICANT CHANGE UP (ref 27–34)
MCHC RBC-ENTMCNC: 33.2 G/DL — SIGNIFICANT CHANGE UP (ref 32–36)
MCV RBC AUTO: 90 FL — SIGNIFICANT CHANGE UP (ref 80–100)
NRBC BLD AUTO-RTO: 0 /100 WBCS — SIGNIFICANT CHANGE UP (ref 0–0)
PLATELET # BLD AUTO: 212 K/UL — SIGNIFICANT CHANGE UP (ref 150–400)
POTASSIUM SERPL-MCNC: 3.6 MMOL/L — SIGNIFICANT CHANGE UP (ref 3.5–5.3)
POTASSIUM SERPL-SCNC: 3.6 MMOL/L — SIGNIFICANT CHANGE UP (ref 3.5–5.3)
RBC # BLD: 4.11 M/UL — LOW (ref 4.2–5.8)
RBC # FLD: 14.4 % — SIGNIFICANT CHANGE UP (ref 10.3–14.5)
SODIUM SERPL-SCNC: 144 MMOL/L — SIGNIFICANT CHANGE UP (ref 135–145)
WBC # BLD: 8.69 K/UL — SIGNIFICANT CHANGE UP (ref 3.8–10.5)
WBC # FLD AUTO: 8.69 K/UL — SIGNIFICANT CHANGE UP (ref 3.8–10.5)

## 2025-05-21 PROCEDURE — 99233 SBSQ HOSP IP/OBS HIGH 50: CPT

## 2025-05-21 PROCEDURE — 74230 X-RAY XM SWLNG FUNCJ C+: CPT | Mod: 26

## 2025-05-21 PROCEDURE — 99233 SBSQ HOSP IP/OBS HIGH 50: CPT | Mod: FS

## 2025-05-21 RX ORDER — METRONIDAZOLE 250 MG
500 TABLET ORAL EVERY 12 HOURS
Refills: 0 | Status: DISCONTINUED | OUTPATIENT
Start: 2025-05-22 | End: 2025-05-23

## 2025-05-21 RX ORDER — METRONIDAZOLE 250 MG
500 TABLET ORAL ONCE
Refills: 0 | Status: COMPLETED | OUTPATIENT
Start: 2025-05-21 | End: 2025-05-21

## 2025-05-21 RX ORDER — CEFTRIAXONE 500 MG/1
1000 INJECTION, POWDER, FOR SOLUTION INTRAMUSCULAR; INTRAVENOUS EVERY 24 HOURS
Refills: 0 | Status: DISCONTINUED | OUTPATIENT
Start: 2025-05-22 | End: 2025-05-23

## 2025-05-21 RX ORDER — METRONIDAZOLE 250 MG
TABLET ORAL
Refills: 0 | Status: DISCONTINUED | OUTPATIENT
Start: 2025-05-21 | End: 2025-05-23

## 2025-05-21 RX ADMIN — QUETIAPINE FUMARATE 50 MILLIGRAM(S): 25 TABLET ORAL at 11:16

## 2025-05-21 RX ADMIN — Medication 1000 UNIT(S): at 11:16

## 2025-05-21 RX ADMIN — Medication 40 MILLIGRAM(S): at 11:16

## 2025-05-21 RX ADMIN — HEPARIN SODIUM 5000 UNIT(S): 1000 INJECTION INTRAVENOUS; SUBCUTANEOUS at 14:01

## 2025-05-21 RX ADMIN — Medication 100 MILLIGRAM(S): at 06:10

## 2025-05-21 RX ADMIN — MEMANTINE HYDROCHLORIDE 5 MILLIGRAM(S): 21 CAPSULE, EXTENDED RELEASE ORAL at 11:16

## 2025-05-21 RX ADMIN — Medication 250 MILLIGRAM(S): at 11:16

## 2025-05-21 RX ADMIN — DONEPEZIL HYDROCHLORIDE 10 MILLIGRAM(S): 5 TABLET ORAL at 21:32

## 2025-05-21 RX ADMIN — Medication 250 MILLIGRAM(S): at 17:37

## 2025-05-21 RX ADMIN — HEPARIN SODIUM 5000 UNIT(S): 1000 INJECTION INTRAVENOUS; SUBCUTANEOUS at 06:09

## 2025-05-21 RX ADMIN — HEPARIN SODIUM 5000 UNIT(S): 1000 INJECTION INTRAVENOUS; SUBCUTANEOUS at 21:33

## 2025-05-21 RX ADMIN — AMLODIPINE BESYLATE 5 MILLIGRAM(S): 10 TABLET ORAL at 06:09

## 2025-05-21 RX ADMIN — Medication 250 MILLIGRAM(S): at 14:01

## 2025-05-21 RX ADMIN — CEFTRIAXONE 100 MILLIGRAM(S): 500 INJECTION, POWDER, FOR SOLUTION INTRAMUSCULAR; INTRAVENOUS at 06:02

## 2025-05-21 RX ADMIN — Medication 100 MILLIGRAM(S): at 17:36

## 2025-05-21 RX ADMIN — Medication 5 MILLIGRAM(S): at 21:32

## 2025-05-21 NOTE — PROGRESS NOTE ADULT - PROBLEM SELECTOR PLAN 7
As above.  83 yo male with advanced Lewy Body dementia with behavioral disturbances, mid FAST stage 6 by history, hx of multiple hospitalizations for worsening agitation, now admitted for AHRF 2/2 aspiration pneumonia, colitis, and FOSTER, with evidence of progressive dysphagia.  Substantially increased risk of further morbidity and mortality over the next 12 months, and FAST staging less predictive in this case  Onset of worsening dysphagia with clinically significant aspiration/aspiration pneumonia is poor prognostic sign in patients with advanced Lewy Body dementia.  Patient  is hospice appropriate with general prognosis of 6 to 12 months or less    See GOC discussion from 5/19--son given  anticipatory guidance and counseling regarding expected disease trajectory, amenable to exploring hospice services, medical team and [facility] SW aware.  Also given pre-emptive counseling about potential future PEG vs comfort feeds, no final decision at this time.    5/20--son in formal agreement for return to [facility] with hospice services    Otherwise continue management as per primary medical team  [facility] orders updated to DNR/DNI/no PEG (see additional GOC discussion above)  Discussed with medical team and Dr. Barrientos in detail      Discharge planning in progress  No additional/acute palliative care needs identified at this time  Palliative Care team will sign off.  Please reconsult PRN.

## 2025-05-21 NOTE — PROGRESS NOTE ADULT - PROBLEM SELECTOR PLAN 1
History of Lewy Body dementia with paranoia and other behavioral issues, with multiple hospitalizations for agitation in the past.    By clinical assessment, patient is mid to late FAST stage 6, but FAST staging is much less predictive in setting of Lewy Body disease.  Patient now presenting with worsening agitation and confusion over the last month, as well as progressive dysphagia.  Development of clinically significant aspiration with pneumonia in patient with LBD is a poor prognostic sign.  Patient is at substantially increased risk of further morbidity, recurrent hospitalizations, and decline towards death. In setting of advanced Lewy Body dementia, patient is hospice appropriate with general prognosis in the range of 6 to 12 months     See GOC discussion from 5/19--son counseled and given anticipatory guidance regarding prognosis and expected disease trajectory, understands that patient has advanced/progressive disease, now in agreement with patient returning to [facility] with hospice services, will refer to [facility] SW.  DNR/DNI in place    Continue home donepezil, valproic acid, and quetiapine  Memantine added by primary team at son's request, though I am doubtful it will have any efficacy  For additional breakthrough agitation, would recommend PRN doses of Seroquel or IM Zyprexa if needed  Continue supportive care.

## 2025-05-21 NOTE — PROGRESS NOTE ADULT - PROBLEM SELECTOR PLAN 1
p/w sob, cough and a choking spell  CT Chest shows Trace secretions in the trachea. Bilateral groundglass nodular opacities with superimposed areas of nodular consolidation. Mild bibasilar atelectasis.  -2/2 aspiration pneumonia  -C/w CTX and flagyl last day today  -aspiration precaution  -Speech and swallow eval- recommended puree with moderately thick liquid but continue to cough when eating  -Will ask S/S to re-eval pt.   -O2 supplementation, wean off as tolerated

## 2025-05-21 NOTE — SWALLOW VFSS/MBS ASSESSMENT ADULT - SLP PERTINENT HISTORY OF CURRENT PROBLEM
84y/M[facility], ambulates with walker, PMH of Dementia with behavioural issues, HTN, presented to ED with multiple episodes of loose bm with associated nausea and vomiting, and choking episode. Patient hypoxic to 90% on RA on arrival. As per progress note, Ct showing colitis and Bilateral groundglass nodular opacities with superimposed areas of nodular consolidation. Admitted for AHRF 2/2 aspiration pneumonia due to colitis.

## 2025-05-21 NOTE — PROGRESS NOTE ADULT - NUTRITIONAL ASSESSMENT
This patient has been assessed with a concern for Malnutrition and has been determined to have a diagnosis/diagnoses of Moderate protein-calorie malnutrition.    This patient is being managed with:   Diet Pureed-  Mildly Thick Liquids (MILDTHICKLIQS)  Entered: May 21 2025 10:29AM    The following pending diet order is being considered for treatment of Moderate protein-calorie malnutrition:  Diet Pureed-  Moderately Thick Liquids (MODTHICKLIQS)  Supplement Feeding Modality:  Oral  Entered: May 20 2025  5:00PM

## 2025-05-21 NOTE — PROGRESS NOTE ADULT - PROBLEM SELECTOR PROBLEM 2
Aspiration pneumonia
Aspiration pneumonia
Acute hypoxic respiratory failure
Aspiration pneumonia
Acute hypoxic respiratory failure
Aspiration pneumonia
Acute hypoxic respiratory failure

## 2025-05-21 NOTE — PROGRESS NOTE ADULT - SUBJECTIVE AND OBJECTIVE BOX
Patient is a 84y old  Male who presents with a chief complaint of AHRF 2/2 ?aspiration pneumonia and colitis (20 May 2025 16:43)      INTERVAL HPI/OVERNIGHT EVENTS: no acute events overnight         REVIEW OF SYSTEMS: unable to assess due to pt metal status       T(C): 36.7 (05-21-25 @ 06:00), Max: 37 (05-20-25 @ 11:58)  HR: 74 (05-21-25 @ 06:00) (74 - 90)  BP: 158/80 (05-21-25 @ 06:00) (158/80 - 161/82)  RR: 18 (05-21-25 @ 06:00) (17 - 18)  SpO2: 93% (05-21-25 @ 06:00) (93% - 100%)  Wt(kg): --Vital Signs Last 24 Hrs  T(C): 36.7 (21 May 2025 06:00), Max: 37 (20 May 2025 11:58)  T(F): 98 (21 May 2025 06:00), Max: 98.6 (20 May 2025 11:58)  HR: 74 (21 May 2025 06:00) (74 - 90)  BP: 158/80 (21 May 2025 06:00) (158/80 - 161/82)  BP(mean): 106 (21 May 2025 06:00) (106 - 106)  RR: 18 (21 May 2025 06:00) (17 - 18)  SpO2: 93% (21 May 2025 06:00) (93% - 100%)    Parameters below as of 21 May 2025 06:00  Patient On (Oxygen Delivery Method): room air        PHYSICAL EXAM:  GENERAL: NAD, refusing open eyes today ,   EYES: clear conjunctiva; EOMI  ENMT: Moist mucous membranes  NECK: Supple, No JVD, Normal thyroid  CHEST/LUNG: Clear to auscultation bilaterally; No rales, rhonchi, wheezing, or rubs  HEART: S1, S2, Regular rate and rhythm  ABDOMEN: Soft, Nontender, Nondistended; Bowel sounds present  NEURO: Alert & awake   EXTREMITIES: No LE edema, no calf tenderness  LYMPH: No lymphadenopathy noted  SKIN: No rashes or lesions    Consultant(s) Notes Reviewed:  [x ] YES  [ ] NO  Care Discussed with Consultants/Other Providers [ x] YES  [ ] NO    LABS:                        12.3   8.69  )-----------( 212      ( 21 May 2025 07:01 )             37.0     05-21    144  |  112[H]  |  22[H]  ----------------------------<  111[H]  3.6   |  25  |  0.73    Ca    9.1      21 May 2025 07:01        CAPILLARY BLOOD GLUCOSE      Urinalysis Basic - ( 21 May 2025 07:01 )    Color: x / Appearance: x / SG: x / pH: x  Gluc: 111 mg/dL / Ketone: x  / Bili: x / Urobili: x   Blood: x / Protein: x / Nitrite: x   Leuk Esterase: x / RBC: x / WBC x   Sq Epi: x / Non Sq Epi: x / Bacteria: x        RADIOLOGY & ADDITIONAL TESTS:    Imaging Personally Reviewed:  [x ] YES  [ ] NO    < from: CT Abdomen and Pelvis No Cont (05.15.25 @ 06:00) >    ACC: [id number] EXAM:  CT ABDOMEN AND PELVIS   ORDERED BY: WENDI SHANNON     ACC: [id number] EXAM:  CT CHEST   ORDERED BY: WENDI SHANNON     PROCEDURE DATE:  05/15/2025          INTERPRETATION:  CLINICAL INFORMATION: Hypoxia, nausea and diarrhea.    COMPARISON: None.    CONTRAST/COMPLICATIONS:  IV Contrast: None  Oral Contrast: None      PROCEDURE:  CT of the Chest, Abdomen and Pelvis was performed.  Sagittal and coronal reformats were performed.    FINDINGS:  Evaluation of solid organs and vascular structures is limited without   intravenous contrast. Exam is degraded by motion artifact.    CHEST:  LUNGS AND LARGE AIRWAYS: Evaluation degraded by motion artifact. Trace   secretions in the trachea. Bilateral groundglass nodular opacities with   superimposed areas of nodular consolidation. Mild bibasilar atelectasis.  PLEURA: No pleural effusion.  VESSELS: Aortic calcifications.  HEART: Heart size is normal. Trace pericardial effusion.  MEDIASTINUM AND LAY: No lymphadenopathy.  CHEST WALL AND LOWER NECK: 3 mm hypodense right thyroid nodule.    ABDOMEN AND PELVIS:  LIVER: Within normal limits.  BILE DUCTS: Normal caliber.  GALLBLADDER: Limited evaluation due to motion artifact.  SPLEEN: Grossly within normal limits. Limited evaluationof the superior   aspect due to motion artifact.  PANCREAS: Within normal limits.  ADRENALS: Within normal limits.  KIDNEYS/URETERS: No renal stones or hydronephrosis.    BLADDER: Mild wall thickening but underdistended.  REPRODUCTIVE ORGANS: Enlarged prostate.    BOWEL: No bowel obstruction. Appendix is normal. Mild wall thickening of   the cecum and ascending colon.  PERITONEUM/RETROPERITONEUM: Within normal limits.  VESSELS: Atherosclerotic changes.  LYMPH NODES: No lymphadenopathy.  ABDOMINALWALL: Small fat-containing umbilical hernia. Small bilateral   fat-containing inguinal hernias.  BONES: Degenerative changes.    IMPRESSION:  Exam limited by noncontrast technique and motion artifact.    Bilateral groundglass nodular opacities with superimposed areas of   nodular consolidation, likely infectious in etiology. Follow-up CT after   treatment is recommended to ensure resolution.    Mild wall thickening of the cecum and ascending colon could reflect   underdistention versus colitis. Clinical correlation recommended.      --- End of Report ---            RISHABH STACK MD; Attending Radiologist  This document has been electronically signed. May 15 2025  6:17AM    < end of copied text >

## 2025-05-21 NOTE — SWALLOW VFSS/MBS ASSESSMENT ADULT - DIAGNOSTIC IMPRESSIONS
Pt p/w severe oropharyngeal dysphagia with psychogenic component, c/b frequent refusals, impaired PO intake/acceptance/reception, impaired bolus formation, poor bolus transport, base-of-tongue pumping, and delayed initiation of swallow trigger.

## 2025-05-21 NOTE — PROGRESS NOTE ADULT - PROBLEM SELECTOR PROBLEM 4
High serum lactate
Colitis
High serum lactate

## 2025-05-21 NOTE — PROGRESS NOTE ADULT - SUBJECTIVE AND OBJECTIVE BOX
84y Male    Meds:    Allergies    No Known Allergies    Intolerances        VITALS:  Vital Signs Last 24 Hrs  T(C): 36.6 (21 May 2025 13:46), Max: 37 (20 May 2025 20:37)  T(F): 97.8 (21 May 2025 13:46), Max: 98.6 (20 May 2025 20:37)  HR: 79 (21 May 2025 13:46) (74 - 90)  BP: 161/82 (21 May 2025 13:46) (158/80 - 161/82)  BP(mean): 108 (21 May 2025 13:46) (106 - 108)  RR: 18 (21 May 2025 13:46) (18 - 18)  SpO2: 92% (21 May 2025 13:46) (92% - 94%)    Parameters below as of 21 May 2025 13:46  Patient On (Oxygen Delivery Method): room air        LABS/DIAGNOSTIC TESTS:                          12.3   8.69  )-----------( 212      ( 21 May 2025 07:01 )             37.0         05-21    144  |  112[H]  |  22[H]  ----------------------------<  111[H]  3.6   |  25  |  0.73    Ca    9.1      21 May 2025 07:01            CULTURES: Blood Blood-Peripheral  05-15 @ 06:40   No growth at 5 days  --  --      Blood Blood-Peripheral  05-15 @ 06:30   No growth at 5 days  --  --            RADIOLOGY:      ROS:  [  ] UNABLE TO ELICIT 84y Male who is more lethargic today and not answering any of my questions , he is not in any resp distress and does not appear SOB, he is coughing a little in front of me, he has no fevers or leukocytosis, he abxs fell off but needs till tomorrow.     Meds:    Allergies    No Known Allergies    Intolerances        VITALS:  Vital Signs Last 24 Hrs  T(C): 36.6 (21 May 2025 13:46), Max: 37 (20 May 2025 20:37)  T(F): 97.8 (21 May 2025 13:46), Max: 98.6 (20 May 2025 20:37)  HR: 79 (21 May 2025 13:46) (74 - 90)  BP: 161/82 (21 May 2025 13:46) (158/80 - 161/82)  BP(mean): 108 (21 May 2025 13:46) (106 - 108)  RR: 18 (21 May 2025 13:46) (18 - 18)  SpO2: 92% (21 May 2025 13:46) (92% - 94%)    Parameters below as of 21 May 2025 13:46  Patient On (Oxygen Delivery Method): room air        LABS/DIAGNOSTIC TESTS:                          12.3   8.69  )-----------( 212      ( 21 May 2025 07:01 )             37.0         05-21    144  |  112[H]  |  22[H]  ----------------------------<  111[H]  3.6   |  25  |  0.73    Ca    9.1      21 May 2025 07:01            CULTURES: Blood Blood-Peripheral  05-15 @ 06:40   No growth at 5 days  --  --      Blood Blood-Peripheral  05-15 @ 06:30   No growth at 5 days  --  --            RADIOLOGY:      ROS:  [ x ] UNABLE TO ELICIT

## 2025-05-21 NOTE — PROGRESS NOTE ADULT - TIME BILLING
Discussion with patient's aid, review of overnight events, new events such as fever requiring further management and monitoring. Discussion with consultants palliative care and infectious disease. DIscussion with patient's aid for collateral information. Review of labs and interpretation. Formulation of plan, medical and medication management, documentation of encounter
Interim chart review, examination of patient, collaboration with primary medical team, and documentation in the medical record.
Complexity of active medical problems, review of data in EMR, bedside assessment, formulating treatment plan after discussion with medical team, consultants and with multidisciplinary team during IDR; as well as discussion of treatment plan, prognosis, care coordination, and GOC with family
Discussion and colalteral frompatient's aid. Discussion and update with patient's HCP/son, guerda abbasi, adding new medications, review of labs and imaging, coordination with RN, formulation of plan, medical and medication management, documentation of encounter
Discussion and collateral from patient's aid. Discussion and update with aid at bedside, worsening disease requiring intervention, high risk given recurrent aspiration and hypoxia, review of labs and imaging, coordination with RN, formulation of plan, medical and medication management, documentation of encounter
Interim chart review, examination of patient, collaboration with primary medical team, and documentation in the medical record.
Interim chart review (including review of imaging and test results), examination of patient, discussion with family via telephone, collaboration with primary medical team, and documentation in the medical record.
Discussion with patient, collateral from patient's aid, review of labs, interpretation of imaging, coordination with swallow therapist, formulation of plan, medical and medication management, documentation of encounter

## 2025-05-21 NOTE — PHARMACOTHERAPY INTERVENTION NOTE - COMMENTS
Quetiapine scheduled be given at 12:00 but is supposed to be given at bedtime according to home med rec. Recommend re-sechedule to 20:00
Patient identified by [facility] for Pneumonia. Medication list was reviewed and no additional interventions at this time.  
Dose at 8 PM
Indication: colitis, dose due 5/16 at 6 AM
[facility] list pt with PNA, medication profile reviewed. Just IV Avinash is currently on board; DNR/DNI patient

## 2025-05-21 NOTE — PROGRESS NOTE ADULT - PROBLEM SELECTOR PROBLEM 7
Dementia
Encounter for palliative care
Dementia
Encounter for palliative care
Dementia
Encounter for palliative care
Dementia

## 2025-05-21 NOTE — PROGRESS NOTE ADULT - PROBLEM SELECTOR PROBLEM 1
Acute hypoxic respiratory failure
Severe Lewy body dementia with agitation
Acute hypoxic respiratory failure
Acute hypoxic respiratory failure
Severe Lewy body dementia with agitation
Severe Lewy body dementia with agitation
Acute hypoxic respiratory failure

## 2025-05-21 NOTE — SWALLOW VFSS/MBS ASSESSMENT ADULT - RECOMMENDED CONSISTENCY
Unable to make diet recommendations at this time 2/2 pt refusal to take sufficient PO trials for swallow evaluation. Therefore, will defer PO diet to medical team at this time.   --->  If comfort feed are being considered, Puree with thin liquids     3) Discussed results and recommendations with

## 2025-05-21 NOTE — PROGRESS NOTE ADULT - PROBLEM SELECTOR PROBLEM 5
FOSTER (acute kidney injury)
Moderate protein-calorie malnutrition
FOSTER (acute kidney injury)
FOSTER (acute kidney injury)
Moderate protein-calorie malnutrition
Moderate protein-calorie malnutrition
FOSTER (acute kidney injury)

## 2025-05-21 NOTE — PROGRESS NOTE ADULT - CONVERSATION DETAILS
Telephonic family meeting held with son to discuss prognosis, ACP, goals of care, and treatment preferences.   Son engaged in conversation voluntarily.  Supportive role of palliative care team explained.  Current hospital course and anticipated disease trajectory of patient’s dementia, aspiration pneumonia, and progressive dysphagia discussed with son in detail.     Parallel history obtained from son, who states that patient has had dementia symptoms since 2021 (originally manifested as paranoia); he was subsequently diagnosed with Lewy Body Dementia in 2023.  States that patient's agitation, paranoia, and delusions were relatively stable on regimen of Depakote and quetiapine until very recently (about 1 month ago).  States that over the last month, patient has had noticeable increase in his agitation and confusion, is becoming more paranoid again, also has had notable increase in his dysphagia.     Advised son that Lewy Body dementia usually has a more rapid course of decline compared to typical dementia.  Also advised him that patient is likely to continue having accelerated decline with ongoing aspiration risk, and that recent addition of memantine is unlikely to improve patient's behavior or reverse underlying disease trajectory,  Son is generally aware that patient is having continued decline related to his dementia; his primary goal is for his father to be comfortable.  Advised him that even though patient is still somewhat ambulatory and participating in group home activities, given his history of LBD, combined with his worsening agitation and progressive dysphagia now resulting in aspiration, patient is hospice appropriate with general prognosis within the range of 6 to 12 months. Introduced hospice philosophy and services, including focus on comfort goals of care and symptom management at home/[facility] setting, along with avoidance of future hospitalization.  Also advised son that given his progressive dysphagia and aspiration risk, [facility] may be reluctant to accept patient back unless he returns to the facility on hospice.  Son voiced understanding, and is amenable to exploring hospice services, will refer to [facility] for additional information and support.    Risks/benefits/burdens of allowing natural death vs full CPR and intubation discussed with son  in detail.  He was counseled that in case of in-hospital cardiac arrest, CPR was unlikely to be successful, that chances of survival to hospital discharge with intact neurological and functional status would be minimal (< 3%); and that such recovery would be at the expense of significant harms/burden of suffering, including broken ribs, additional pain, decreased LOC, prolonged ventilatory support, etc.   Son was also counseled that CPR and intubation would not improve survival or restore patient's life in a meaningful way.  Son stated  that he had been discussing same with his aunt (patient's sister Yanet, who is alternate HCP), and they are also in agreement with no resuscitation and allowing for natural death.  [facility]OL[facility] orders for DNR/DNI created.    Son counseled regarding natural and expected decline in appetite, and worsening dysphagia/swallowing, as a consequence of end stage disease.  Anticipatory guidance and risks/benefits/burdens of careful hand feeding for comfort vs long term PEG insertion discussed with son in detail   Family counseled that PEG insertion will not reverse underlying disease trajectory of patient’s advanced dementia, will not bring patient back to his previous functional baseline, will not improve/restore patient’s swallowing ability, and is unlikely to significantly improve survival compared to careful hand feeding alone.  Also discussed that PEG placement does not 100% eliminate the risk of aspiration, and that PEG placement has significant complications and negative adverse effects upon quality of life, including increased risk of hospitalizations and NH placement.   We also discussed that patient likely would not be able to tolerate PEG and would be at high risk of pulling tube out due to his history of agitation.  Son voiced understanding, is leaning towards no future PEG placement, but again wishes to discuss with patient's sister before any final decision.    Son was greatly appreciative of the conversation, and all of his questions were answered
Additional telephonic family meeting held with son late this afternoon to discuss prognosis, ACP, goals of care, and treatment preferences.   Son engaged in conversation voluntarily.     Again reinforced to son that patient has advanced Lewy Body Dementia that is progressing, and that he remains hospice appropriate.  Counseled son that patient is in a new phase of disease and may continue to go back and forth between good days and bad days, will have some days when he is more awake and eats better, and other days where he will be sleepier and not eat, etc.  Again counseled son that this is all part of the expected and natural process as patient declines from his underlying disease.  Son voiced understanding.    Risks/benefits/burdens of careful hand feeding for comfort vs long term PEG insertion again briefly discussed with the son.   Reinforced that future PEG insertion will not reverse underlying disease trajectory of patient’s advanced dementia, will not bring patient back to his previous functional baseline, will not improve/restore patient’s swallowing ability, and is unlikely to significantly improve survival compared to careful hand feeding alone.   Son verbalized agreement for no PEG, MOLST orders (page 2) updated accordingly.  Son remains in agreement with current discharge plan for return to [facility] with hospice.    Son was were appreciative of the conversation, and all of his questions were answered.

## 2025-05-21 NOTE — PROGRESS NOTE ADULT - ASSESSMENT
Pneumonia - likely aspiration  Colitis  Fever - resolved  Leukocytosis - normalized      Plan - Cont Rocephin 1gm iv q24hrs  Cont Flagyl 500mgs iv q12hrs  cont both antibiotics till tomorrow then DC all antibiotics  DC planning.

## 2025-05-21 NOTE — PROGRESS NOTE ADULT - PROBLEM SELECTOR PLAN 6
has h/o HTN on amlodipine at home  c/w home meds with holding parameters
In the setting of progressive dementia, patient already requiring moderate assistance with ADLs at baseline.  At significant risk of deconditioning during this hospitalization, as well as at increased risk of ongoing aspiration, recurrent infections, worsening skin failure/breakdown, and repeat hospital admissions.    PT consult appreciated      Recommend:  OOB to chair/bedside PT as tolerated  Frequent turning and repositioning while in bed.
has h/o HTN on amlodipine at home  c/w home meds with holding parameters
has h/o HTN on amlodipine at home  c/w home meds with holding parameters
In the setting of progressive dementia, patient already requiring moderate assistance with ADLs at baseline.  At significant risk of deconditioning during this hospitalization, as well as at increased risk of ongoing aspiration, recurrent infections, worsening skin failure/breakdown, and repeat hospital admissions.    PT consult appreciated      Recommend:  OOB to chair/bedside PT as tolerated  Frequent turning and repositioning while in bed.
has h/o HTN on amlodipine at home  c/w home meds with holding parameters
In the setting of progressive dementia, patient already requiring moderate assistance with ADLs at baseline.  At significant risk of deconditioning during this hospitalization, as well as at increased risk of ongoing aspiration, recurrent infections, worsening skin failure/breakdown, and repeat hospital admissions.    PT consult appreciated      Recommend:  OOB to chair/bedside PT as tolerated  Frequent turning and repositioning while in bed.

## 2025-05-21 NOTE — PROGRESS NOTE ADULT - PROBLEM SELECTOR PLAN 8
DVT prop: heparin sq iso brii
DVT prop: heparin sq iso brii
DVT prop: heparin sq iso brii  GI PPX-      D/c planning [facility] with hospice
DVT prop: heparin sq iso brii  GI PPX-
DVT prop: heparin sq iso brii  GI PPX-
DVT prop: heparin sq iso brii
DVT prop: heparin sq iso brii

## 2025-05-21 NOTE — PROGRESS NOTE ADULT - PROBLEM SELECTOR PLAN 2
In the setting of aspiration pneumonia vs pneumonitis  Continue IV antibiotics per ID and primary team  Started on Duonebs Q6 ATC  SLP eval appreciated--downgraded to pureed diet.

## 2025-05-21 NOTE — SWALLOW VFSS/MBS ASSESSMENT ADULT - COMMENTS
Pt refused majority of PO trials at today's exam. Poor pharyngeal contractility, hyo-laryngeal excursion, and cricopharyngeal relaxation were evident. There was maximal retention in the braulio-pharynx and hypopharynx. 90% of the bolus remained in the pharynx. The patient was unable to clear the same with multiple swallow stratgies.

## 2025-05-21 NOTE — SWALLOW VFSS/MBS ASSESSMENT ADULT - SLP GENERAL OBSERVATIONS
Study conducted with Radiologist, Dr. Sheffield. Pt seated in wheelchair in left lateral view for videofluoroscopy.

## 2025-05-21 NOTE — PROGRESS NOTE ADULT - PROBLEM SELECTOR PLAN 5
Creatinine 1.4 on arrival  FOSTER improved with fluids earlier on admission  continues to improve off IV fluids now
Clinical evidence indicates that the patient has Moderate protein calorie malnutrition/ 2nd degree  In context of Chronic Illness (>1 month)--advanced dementia with behavioral disturbance, as evidenced by:    Energy/Food intake <75% of estimated energy requirement >7 days  Weight loss: Mild  (lbs lost recently)  Body Fat loss: Mild   + mild temporal wasting  Muscle mass loss: Mild   albumin 2.6, at high risk for skin failure   Strength: weakened Mild     Recommend:   c/w pureed  diet with thickened liquids, strict aspiration precautions, keep head of bed at least 45 degrees during feeding  Consider nutritional supplements as tolerated and/or formal nutrition consult.    Preemptive counseling given to son about risks/benefits/burdens of future PEG vs comfort feeds, leaning towards no PEG, but no final decision at this time (wishes to discuss further with family)
p/w Cr (1.14 on 04/25)  likely prerenal iso dehydration  -c/w IVF  -monitor Scr
p/w Cr (1.14 on 04/25)  FOSTER improved with fluids  will continue isotonic fluids
p/w Cr (1.14 on 04/25)  likely prerenal iso dehydration  -c/w IVF  -monitor Scr
p/w Cr (1.14 on 04/25)  likely prerenal iso dehydration  -c/w IVF  -monitor Scr
Clinical evidence indicates that the patient has Moderate protein calorie malnutrition/ 2nd degree  In context of Chronic Illness (>1 month)--advanced dementia with behavioral disturbance, as evidenced by:    Energy/Food intake <75% of estimated energy requirement >7 days  Weight loss: Mild  (lbs lost recently)  Body Fat loss: Mild   + mild temporal wasting  Muscle mass loss: Mild   albumin 2.6, at high risk for skin failure   Strength: weakened Mild     Recommend:   c/w pureed  diet with thickened liquids, strict aspiration precautions, keep head of bed at least 45 degrees during feeding  Consider nutritional supplements as tolerated and/or formal nutrition consult.    No PEG tube per MOLST orders/additional GOC discussion above
p/w Cr (1.14 on 04/25)  FOSTER improved with fluids  will continue isotonic fluids
p/w Cr (1.14 on 04/25)  likely prerenal iso dehydration  -c/w IVF  -monitor Scr
Clinical evidence indicates that the patient has Moderate protein calorie malnutrition/ 2nd degree  In context of Chronic Illness (>1 month)--advanced dementia with behavioral disturbance, as evidenced by:    Energy/Food intake <75% of estimated energy requirement >7 days  Weight loss: Mild  (lbs lost recently)  Body Fat loss: Mild   + mild temporal wasting  Muscle mass loss: Mild   albumin 2.6, at high risk for skin failure   Strength: weakened Mild     Recommend:   c/w pureed  diet with thickened liquids, strict aspiration precautions, keep head of bed at least 45 degrees during feeding  Consider nutritional supplements as tolerated and/or formal nutrition consult.    Preemptive counseling given to son about risks/benefits/burdens of future PEG vs comfort feeds, leaning towards no PEG, but no final decision at this time (wishes to discuss further with family)

## 2025-05-21 NOTE — PROGRESS NOTE ADULT - ASSESSMENT
84y/M, [facility], ambulates with walker, PMHx of Dementia with behavioural issues, HTN, presented to ED with multiple episodes of loose bm with associated nausea and vomiting, and choking episode. Patient hypoxic to 90% on RA on arrival. Ct showing colitis and Bilateral groundglass nodular opacities with superimposed areas of nodular consolidation. Admitted for AHRF 2/2 aspiration pneumonia due to colitis.  SLP evaluation confirmed significant dysphagia.  Palliative Care consulted for additional GOC discussion

## 2025-05-21 NOTE — PROGRESS NOTE ADULT - PROBLEM SELECTOR PROBLEM 6
Physical debility
HTN (hypertension)
Physical debility
HTN (hypertension)
Physical debility

## 2025-05-21 NOTE — PROGRESS NOTE ADULT - SUBJECTIVE AND OBJECTIVE BOX
follow up on:  complex medical decision making related to goals of care    [facility] Geriatric and Palliative Consult Service:  Twila Stephenson DO: cell ([phone])  Ryan Maria MD: cell ([phone])  Lloyd Ko NP: cell ([phone])   Jessica Fleischer-Black MD:  cell ([phone])  Manpreet Landaverde LMSW: cell ([phone])     Can contact any Palliative Team member via Microsoft Teams for consults and questions      OVERNIGHT EVENTS:    Present Symptoms: Mild, Moderate, Severe  Pain:             Location -                               Aggravating factors -             Quality -             Radiation -             Timing-             Severity (0-10 scale):             Minimal acceptable level (0-10 scale):  Fatigue:  denies  Nausea:  denies  Lack of Appetite:  denies  SOB:  denies  Depression:  denies  Anxiety:  denies  Constipation:  denies      Review of Systems: All other systems reviewed and are negative or Unable to obtain due to poor mentation    CPOT:    https://ccs-st.org/resources/Documents/Sedation%20Analgesia%20Delirium%20in%20CC/CCS%20STH%20Guideline%20template%20CPOT.pdf  PAIN AD Score:   http://geriatrictoolkit.[place].[facility]/cog/painad.pdf (press ctrl +  left click to view)MEDICATIONS  (STANDING):  amLODIPine   Tablet 5 milliGRAM(s) Oral daily  cholecalciferol 1000 Unit(s) Oral daily  donepezil 10 milliGRAM(s) Oral at bedtime  heparin   Injectable 5000 Unit(s) SubCutaneous every 8 hours  melatonin 5 milliGRAM(s) Oral at bedtime  memantine 5 milliGRAM(s) Oral daily  metroNIDAZOLE  IVPB      pantoprazole   Suspension 40 milliGRAM(s) Oral daily  QUEtiapine 50 milliGRAM(s) Oral daily  sodium chloride 0.9%. 1000 milliLiter(s) (100 mL/Hr) IV Continuous <Continuous>  valproic  acid Syrup 250 milliGRAM(s) Oral <User Schedule>    MEDICATIONS  (PRN):  ondansetron Injectable 4 milliGRAM(s) IV Push every 8 hours PRN Nausea and/or Vomiting  QUEtiapine 25 milliGRAM(s) Oral every 8 hours PRN Agitation      PHYSICAL EXAM:  Vital Signs Last 24 Hrs  T(C): 36.9 (21 May 2025 20:49), Max: 36.9 (21 May 2025 20:49)  T(F): 98.4 (21 May 2025 20:49), Max: 98.4 (21 May 2025 20:49)  HR: 83 (21 May 2025 21:43) (74 - 84)  BP: 155/85 (21 May 2025 21:43) (155/85 - 186/96)  BP(mean): 109 (21 May 2025 21:43) (106 - 129)  RR: 18 (21 May 2025 20:49) (18 - 18)  SpO2: 94% (21 May 2025 20:49) (92% - 94%)    Parameters below as of 21 May 2025 20:49  Patient On (Oxygen Delivery Method): room air        General: alert  oriented x ____    lethargic distressed cachexia  verbal nonverbal  unarousable     Palliative Performance Scale/Karnofsky Score:  http://npcrc.org/files/news/palliative_performance_scale_ppsv2.pdf    HEENT:  EOMI anicteric, pharynx clear, MM moist  Lungs: tachypnea/labored breathing, clear to auscultation, no congestion noted  CV: RRR, tachycardic, normal S1 and S2, no murmur  GI: soft non distended non tender   + normal bowel sounds  : incontinent  oliguria/anuria  cunningham  Musculoskeletal: weakness x4  in all extremities, ambulatory with assistance   mostly/fully bedbound/wheelchair bound, no edema noted  Skin: no abnormal skin lesions or DU noted, poor skin turgor  Neuro: no deficits, mild cognitive impairment dsyphagia/dysarthria paresis  Oral intake ability: unable/only mouth care, minimal moderate full capability      LABS:                          12.3   8.69  )-----------( 212      ( 21 May 2025 07:01 )             37.0     05-21    144  |  112[H]  |  22[H]  ----------------------------<  111[H]  3.6   |  25  |  0.73    Ca    9.1      21 May 2025 07:01      Urinalysis Basic - ( 21 May 2025 07:01 )    Color: x / Appearance: x / SG: x / pH: x  Gluc: 111 mg/dL / Ketone: x  / Bili: x / Urobili: x   Blood: x / Protein: x / Nitrite: x   Leuk Esterase: x / RBC: x / WBC x   Sq Epi: x / Non Sq Epi: x / Bacteria: x        RADIOLOGY & ADDITIONAL STUDIES: follow up on:  complex medical decision making related to goals of care    [facility] Geriatric and Palliative Consult Service:  Twila Stephenson DO: cell ([phone])  Ryan Maria MD: cell ([phone])  Lloyd Ko NP: cell ([phone])   Jessica Fleischer-Black MD:  cell ([phone])  Manpreet Landaverde SW: cell ([phone])     Can contact any Palliative Team member via Microsoft Teams for consults and questions      OVERNIGHT EVENTS:  None.  Per discussion with medical team, patient more lethargic/sleepier today, with minimal p.o. intake.    Patient examined this afternoon at bedside.  Alert and oriented x 1, still lethargic, won't open eyes, but easily responsive to voice.  Denies pain or SOB.  Overall appears quite comfortable.  No other acute complaints.      Present Symptoms: Mild, Moderate, Severe  Pain:  Denies presently, 0/10  Nausea:  denies  SOB:  denies  Anxiety:  denies    Review of Systems:  Otherwise unable to obtain due to poor mentation/dementia      MEDICATIONS  (STANDING):  amLODIPine   Tablet 5 milliGRAM(s) Oral daily  cholecalciferol 1000 Unit(s) Oral daily  donepezil 10 milliGRAM(s) Oral at bedtime  heparin   Injectable 5000 Unit(s) SubCutaneous every 8 hours  melatonin 5 milliGRAM(s) Oral at bedtime  memantine 5 milliGRAM(s) Oral daily  metroNIDAZOLE  IVPB      pantoprazole   Suspension 40 milliGRAM(s) Oral daily  QUEtiapine 50 milliGRAM(s) Oral daily  sodium chloride 0.9%. 1000 milliLiter(s) (100 mL/Hr) IV Continuous <Continuous>  valproic  acid Syrup 250 milliGRAM(s) Oral <User Schedule>    MEDICATIONS  (PRN):  ondansetron Injectable 4 milliGRAM(s) IV Push every 8 hours PRN Nausea and/or Vomiting  QUEtiapine 25 milliGRAM(s) Oral every 8 hours PRN Agitation      PHYSICAL EXAM:  Vital Signs Last 24 Hrs  T(C): 36.9 (21 May 2025 20:49), Max: 36.9 (21 May 2025 20:49)  T(F): 98.4 (21 May 2025 20:49), Max: 98.4 (21 May 2025 20:49)  HR: 83 (21 May 2025 21:43) (74 - 84)  BP: 155/85 (21 May 2025 21:43) (155/85 - 186/96)  BP(mean): 109 (21 May 2025 21:43) (106 - 129)  RR: 18 (21 May 2025 20:49) (18 - 18)  SpO2: 94% (21 May 2025 20:49) (92% - 94%)    Parameters below as of 21 May 2025 20:49  Patient On (Oxygen Delivery Method): room air        General: alert  oriented x __1__    lethargic but easily responsive, + mild temporal wasting, in NAD    Palliative Performance Scale/Karnofsky Score:  40%  http://npcrc.org/files/news/palliative_performance_scale_ppsv2.pdf    HEENT:  EOMI anicteric, pharynx clear, MM moist  Lungs:  + mild transmitted upper airway wheezing throughout, otherwise clear to auscultation, respirations unlabored  CV: RRR, normal S1 and S2, no murmur  GI: soft non distended non tender   + normal bowel sounds  : urinating  Musculoskeletal: weakness x4  in all extremities, ambulatory with walker (but now increasingly bedbound), no edema noted  Skin: no abnormal skin lesions or DU noted, + decreased skin turgor  Neuro: no focal deficits, + severe cognitive impairment   + dysphagia  Oral intake ability:  moderate  capability, on pureed diet with moderately thickened liquids      LABS:                          12.3   8.69  )-----------( 212      ( 21 May 2025 07:01 )             37.0     05-21    144  |  112[H]  |  22[H]  ----------------------------<  111[H]  3.6   |  25  |  0.73    Ca    9.1      21 May 2025 07:01      Albumin: 2.6 g/dL (05.16.25 @ 05:26)      Urinalysis Basic - ( 21 May 2025 07:01 )    Color: x / Appearance: x / SG: x / pH: x  Gluc: 111 mg/dL / Ketone: x  / Bili: x / Urobili: x   Blood: x / Protein: x / Nitrite: x   Leuk Esterase: x / RBC: x / WBC x   Sq Epi: x / Non Sq Epi: x / Bacteria: x        RADIOLOGY & ADDITIONAL STUDIES:

## 2025-05-21 NOTE — PROGRESS NOTE ADULT - PROBLEM SELECTOR PROBLEM 3
Colitis
Aspiration pneumonia
Colitis
Aspiration pneumonia
Colitis
Aspiration pneumonia
Colitis

## 2025-05-22 PROCEDURE — 99239 HOSP IP/OBS DSCHRG MGMT >30: CPT

## 2025-05-22 RX ORDER — QUETIAPINE FUMARATE 25 MG/1
1 TABLET ORAL
Qty: 0 | Refills: 0 | DISCHARGE
Start: 2025-05-22

## 2025-05-22 RX ORDER — QUETIAPINE FUMARATE 25 MG/1
1 TABLET ORAL
Refills: 0 | DISCHARGE

## 2025-05-22 RX ADMIN — Medication 5 MILLIGRAM(S): at 22:16

## 2025-05-22 RX ADMIN — HEPARIN SODIUM 5000 UNIT(S): 1000 INJECTION INTRAVENOUS; SUBCUTANEOUS at 22:16

## 2025-05-22 RX ADMIN — Medication 1000 UNIT(S): at 13:10

## 2025-05-22 RX ADMIN — Medication 250 MILLIGRAM(S): at 17:50

## 2025-05-22 RX ADMIN — CEFTRIAXONE 100 MILLIGRAM(S): 500 INJECTION, POWDER, FOR SOLUTION INTRAMUSCULAR; INTRAVENOUS at 05:57

## 2025-05-22 RX ADMIN — MEMANTINE HYDROCHLORIDE 5 MILLIGRAM(S): 21 CAPSULE, EXTENDED RELEASE ORAL at 13:10

## 2025-05-22 RX ADMIN — HEPARIN SODIUM 5000 UNIT(S): 1000 INJECTION INTRAVENOUS; SUBCUTANEOUS at 13:10

## 2025-05-22 RX ADMIN — Medication 250 MILLIGRAM(S): at 13:10

## 2025-05-22 RX ADMIN — Medication 100 MILLIGRAM(S): at 05:57

## 2025-05-22 RX ADMIN — Medication 250 MILLIGRAM(S): at 10:12

## 2025-05-22 RX ADMIN — AMLODIPINE BESYLATE 5 MILLIGRAM(S): 10 TABLET ORAL at 05:58

## 2025-05-22 RX ADMIN — QUETIAPINE FUMARATE 50 MILLIGRAM(S): 25 TABLET ORAL at 13:10

## 2025-05-22 RX ADMIN — DONEPEZIL HYDROCHLORIDE 10 MILLIGRAM(S): 5 TABLET ORAL at 22:16

## 2025-05-22 RX ADMIN — Medication 40 MILLIGRAM(S): at 13:10

## 2025-05-22 RX ADMIN — Medication 100 MILLIGRAM(S): at 17:50

## 2025-05-22 RX ADMIN — HEPARIN SODIUM 5000 UNIT(S): 1000 INJECTION INTRAVENOUS; SUBCUTANEOUS at 05:58

## 2025-05-22 NOTE — PROGRESS NOTE ADULT - SUBJECTIVE AND OBJECTIVE BOX
84y Male    Meds:  cefTRIAXone   IVPB 1000 milliGRAM(s) IV Intermittent every 24 hours  metroNIDAZOLE  IVPB 500 milliGRAM(s) IV Intermittent every 12 hours  metroNIDAZOLE  IVPB        Allergies    No Known Allergies    Intolerances        VITALS:  Vital Signs Last 24 Hrs  T(C): 37.1 (22 May 2025 11:47), Max: 37.1 (22 May 2025 11:47)  T(F): 98.7 (22 May 2025 11:47), Max: 98.7 (22 May 2025 11:47)  HR: 79 (22 May 2025 11:47) (72 - 84)  BP: 155/78 (22 May 2025 11:47) (155/78 - 186/96)  BP(mean): 108 (22 May 2025 05:40) (108 - 129)  RR: 18 (22 May 2025 11:47) (18 - 18)  SpO2: 100% (22 May 2025 11:47) (94% - 100%)    Parameters below as of 22 May 2025 11:47  Patient On (Oxygen Delivery Method): room air        LABS/DIAGNOSTIC TESTS:                          12.3   8.69  )-----------( 212      ( 21 May 2025 07:01 )             37.0         05-21    144  |  112[H]  |  22[H]  ----------------------------<  111[H]  3.6   |  25  |  0.73    Ca    9.1      21 May 2025 07:01            CULTURES: Blood Blood-Peripheral  05-15 @ 06:40   No growth at 5 days  --  --      Blood Blood-Peripheral  05-15 @ 06:30   No growth at 5 days  --  --            RADIOLOGY:      ROS:  [  ] UNABLE TO ELICIT 84y Male who is looking better today, he is easily arousable and talking but not answering questions appropriately , he is denying any coughing or SOB but not answering any other questions. He has no fevers and is not coughing in front of me. He is on his last day of antibiotics, he now going to outpatient hospice once his family signs the paperwork.    Meds:  cefTRIAXone   IVPB 1000 milliGRAM(s) IV Intermittent every 24 hours  metroNIDAZOLE  IVPB 500 milliGRAM(s) IV Intermittent every 12 hours  metroNIDAZOLE  IVPB        Allergies    No Known Allergies    Intolerances        VITALS:  Vital Signs Last 24 Hrs  T(C): 37.1 (22 May 2025 11:47), Max: 37.1 (22 May 2025 11:47)  T(F): 98.7 (22 May 2025 11:47), Max: 98.7 (22 May 2025 11:47)  HR: 79 (22 May 2025 11:47) (72 - 84)  BP: 155/78 (22 May 2025 11:47) (155/78 - 186/96)  BP(mean): 108 (22 May 2025 05:40) (108 - 129)  RR: 18 (22 May 2025 11:47) (18 - 18)  SpO2: 100% (22 May 2025 11:47) (94% - 100%)    Parameters below as of 22 May 2025 11:47  Patient On (Oxygen Delivery Method): room air        LABS/DIAGNOSTIC TESTS:                          12.3   8.69  )-----------( 212      ( 21 May 2025 07:01 )             37.0         05-21    144  |  112[H]  |  22[H]  ----------------------------<  111[H]  3.6   |  25  |  0.73    Ca    9.1      21 May 2025 07:01            CULTURES: Blood Blood-Peripheral  05-15 @ 06:40   No growth at 5 days  --  --      Blood Blood-Peripheral  05-15 @ 06:30   No growth at 5 days  --  --            RADIOLOGY:      ROS:  [ x ] UNABLE TO ELICIT, except the 2 questions mentioned above

## 2025-05-22 NOTE — PROGRESS NOTE ADULT - ASSESSMENT
Pneumonia - likely aspiration  Colitis  Fever - resolved  Leukocytosis - normalized      Plan - Cont Rocephin 1gm iv q24hrs  Cont Flagyl 500mgs iv q12hrs  DC both antibiotics today.  DC planning.

## 2025-05-23 VITALS
DIASTOLIC BLOOD PRESSURE: 93 MMHG | RESPIRATION RATE: 18 BRPM | SYSTOLIC BLOOD PRESSURE: 160 MMHG | OXYGEN SATURATION: 93 % | TEMPERATURE: 99 F | HEART RATE: 68 BPM

## 2025-05-23 RX ADMIN — Medication 250 MILLIGRAM(S): at 09:31

## 2025-05-23 RX ADMIN — Medication 100 MILLIGRAM(S): at 06:02

## 2025-05-23 RX ADMIN — HEPARIN SODIUM 5000 UNIT(S): 1000 INJECTION INTRAVENOUS; SUBCUTANEOUS at 05:57

## 2025-05-23 RX ADMIN — CEFTRIAXONE 100 MILLIGRAM(S): 500 INJECTION, POWDER, FOR SOLUTION INTRAMUSCULAR; INTRAVENOUS at 05:30

## 2025-05-23 RX ADMIN — AMLODIPINE BESYLATE 5 MILLIGRAM(S): 10 TABLET ORAL at 05:57

## 2025-05-23 NOTE — DISCHARGE NOTE NURSING/CASE MANAGEMENT/SOCIAL WORK - FINANCIAL ASSISTANCE
[facility] provides services at a reduced cost to those who are determined to be eligible through [facility]’s financial assistance program. Information regarding [facility]’s financial assistance program can be found by going to [email or web address] or by calling [phone].

## 2025-05-23 NOTE — PROGRESS NOTE ADULT - ASSESSMENT
Pneumonia - likely aspiration  Colitis  Fever - resolved  Leukocytosis - normalized    Plan -   ·	Completed Rocephin   ·	Completed Flagyl   ·	DC planning.  ·	reconsult prn

## 2025-05-23 NOTE — PROGRESS NOTE ADULT - NS ATTEND AMEND GEN_ALL_CORE FT
I agree with above
Patient was seen and examined at bedside. S/p barium swallow eval. Less active from yesterday. Appears comfortable     ICU Vital Signs Last 24 Hrs  T(C): 36.7 (21 May 2025 06:00), Max: 37 (20 May 2025 20:37)  T(F): 98 (21 May 2025 06:00), Max: 98.6 (20 May 2025 20:37)  HR: 74 (21 May 2025 06:00) (74 - 90)  BP: 158/80 (21 May 2025 06:00) (158/80 - 161/82)  BP(mean): 106 (21 May 2025 06:00) (106 - 106)  ABP: --  ABP(mean): --  RR: 18 (21 May 2025 06:00) (18 - 18)  SpO2: 93% (21 May 2025 06:00) (93% - 94%)    O2 Parameters below as of 21 May 2025 06:00  Patient On (Oxygen Delivery Method): room air        P/E:  GENERAL: NAD  HEAD:  Atraumatic, Normocephalic  NERVOUS SYSTEM:  Alert & Oriented X1, moving all 4 extremities, but does not follow all commands to complete full neuro exam   CHEST/LUNG: Clear to auscultation anterolaterally  HEART: Regular rate and rhythm; No murmurs, rubs, or gallops  ABDOMEN: Soft, Nontender, Nondistended; Bowel sounds present  EXTREMITIES:  2+ Peripheral Pulses, No clubbing, cyanosis, or edema  SKIN: No rashes or lesions    Labs noted     A/P:  Acute hypoxic respiratory failure due to aspiration pneumonia   HTN  H/o Lewy body dementia with behavioral disturbance   Colitis     Plan:   S/p barium swallow eval, diet changed as per speech and swallow recs   Saturating well on room air   Completed 7days of Ceftriaxone and Metronidazole to cover pneumonia and colitis   lactate normalized   Cont aspiration precaution   Speech and swallow eval  Renal function stable   Cont Amlodipine for HTN   Cont Seroquel 50mg (decreased from home dose 100mg) and Valproic acid. Mental status close to baseline today as per HHA at bedside. Will add Seroquel 25mg TID PRN for agitation  Cont Memantine   Palliative care consult appreciated, DNR/ DNI   PT consult  noted   Plan of care was discussed with patient's primary care giver son over phone; all questions and concerns were addressed . Pending transfer to Assisted living with hospice care with 24x7 private pay A
84 year old male, from [facility] assisted living, history of dementia with behavioral disturbances, HTN, who presented with an episode of diarrhea, followed by choking while on the toilet. EMS came and patient brought to hospital. Due to patient's dementia, unable to provide history, there was aid at bedside who helped with history. Since discharge from [facility] 1 month ago, his behavioral changes havent improved.  He eats regular solid food with dentures, however patient's son Regino notes that patient has had episodes of dysphagia in the past and is how dementia was diagnosed  In the ED, patient was noted hypoxic, CT chest showing bilateral ground glass opacities, CT abdomen revealing colitis. Admitted for further management of colitis and aspiration.    Seen this morning, patient was sleeping and difficult to arouse. he had dysphagia screen in the morning and was swallowing well, started on a soft diet.  History from aid at bedside, no furhter diarrheal episodes, behavior stable, able to tolerate his soft diet.    Physical: elderly gentleman, heart regular, lungs with mild wheezing, abd mildly tender to palpation, soft, bowel sounds present no lower ext edema. aaox1-2. resting comfortably in bed    84 year old male, history of dementia with behavioral disturbance, HTN, who presents with diarrhea and a choking episode, found with hypoxia likely due to aspiration in the setting of colitis.    #Acute hypoxic Respiratory Failure  #Aspiration pneumonia vs pneumonitis  #Acute colitis  #Diarrhea  #HTN  #lactate elevation  #Acute kidney injury      Continue nasal cannula 2lpm, wean off o2 as tolerated  Suspect his vomiting episode led to pneumonitis and pneumonia causing hypoxia, wean off o2 as tolerated  Continue ceftriaxone and metronidazole  Diet advanced, but still needs official swallow evaluation  Diarrhea appears to have improved  Continue isotonic IV fluids, trend creatinine,- Orlin improving, can dc fluids if tolerating adequate food  Lactate normalized  If behavior uncontrolled with home meds, consider IM olanzapine.  Plan was discussed with patient's son, Regino on admission, Explained plan of care. We re-discussed goals of care, per patient's wishes, he does not want resuscitation only if he has a reversible disease. I explained that dementia is a irreversible disease. patient to remain full code trial of resuscitation.

## 2025-05-23 NOTE — PROGRESS NOTE ADULT - REASON FOR ADMISSION
AHRF 2/2 ?aspiration pneumonia and colitis

## 2025-05-23 NOTE — PROGRESS NOTE ADULT - SUBJECTIVE AND OBJECTIVE BOX
84y Male     MEDS:    ALLERGIES: Allergies    No Known Allergies    Intolerances    REVIEW OF SYSTEMS:  [X  ] Not able to elicit    VITALS:  Vital Signs Last 24 Hrs  T(C): 37.1 (23 May 2025 05:30), Max: 37.1 (22 May 2025 11:47)  T(F): 98.8 (23 May 2025 05:30), Max: 98.8 (23 May 2025 05:30)  HR: 68 (23 May 2025 05:30) (68 - 79)  BP: 160/93 (23 May 2025 05:30) (145/85 - 160/93)  BP(mean): 115 (23 May 2025 05:30) (115 - 115)  RR: 18 (23 May 2025 05:30) (18 - 18)  SpO2: 93% (23 May 2025 05:30) (93% - 100%)    Parameters below as of 23 May 2025 05:30  Patient On (Oxygen Delivery Method): room air    PHYSICAL EXAM:  HEENT: normocephalic, conjunctivae and sclerae clear; moist mucous membranes  Neck: supple no LN's   Respiratory: lungs clear no rales  Cardiovascular: S1 S2 reg no murmurs  Gastrointestinal: +BS with soft, nondistended abdomen; nontender  Extremities: no edema  Skin: no rashes  Ortho: no erythema or joint swelling  Neuro: AAO x 1     LABS/DIAGNOSTIC TESTS:    WBC Count: 8.69 K/uL (05-21 @ 07:01)  WBC Count: 10.14 K/uL (05-20 @ 07:16)  WBC Count: 9.27 K/uL (05-19 @ 07:13)    CULTURES:   Blood Blood-Peripheral  05-15 @ 06:40   No growth at 5 days  --  --    Blood Blood-Peripheral  05-15 @ 06:30   No growth at 5 days  --  --    RADIOLOGY:  no new studies 84y Male lying in bed and in no apparent distress, he is confused and not answering any questions appropriately, he is on room air and saturating at 93%, does not appearing hypoxic and remains afebrile. He is not on any antibiotics as he has completed his course.     MEDS:    ALLERGIES: Allergies    No Known Allergies    Intolerances    REVIEW OF SYSTEMS:  [X  ] Not able to elicit    VITALS:  Vital Signs Last 24 Hrs  T(C): 37.1 (23 May 2025 05:30), Max: 37.1 (22 May 2025 11:47)  T(F): 98.8 (23 May 2025 05:30), Max: 98.8 (23 May 2025 05:30)  HR: 68 (23 May 2025 05:30) (68 - 79)  BP: 160/93 (23 May 2025 05:30) (145/85 - 160/93)  BP(mean): 115 (23 May 2025 05:30) (115 - 115)  RR: 18 (23 May 2025 05:30) (18 - 18)  SpO2: 93% (23 May 2025 05:30) (93% - 100%)    Parameters below as of 23 May 2025 05:30  Patient On (Oxygen Delivery Method): room air    PHYSICAL EXAM:  HEENT: normocephalic, conjunctivae and sclerae clear; moist mucous membranes  Neck: supple no LN's   Respiratory: decreased breathe sounds bilaterally   Cardiovascular: S1 S2 reg no murmurs  Gastrointestinal: +BS with soft, nondistended abdomen; nontender  Extremities: no edema  Skin: no rashes  Ortho: no erythema or joint swelling  Neuro: AAO x 1     LABS/DIAGNOSTIC TESTS:    WBC Count: 8.69 K/uL (05-21 @ 07:01)  WBC Count: 10.14 K/uL (05-20 @ 07:16)  WBC Count: 9.27 K/uL (05-19 @ 07:13)    CULTURES:   Blood Blood-Peripheral  05-15 @ 06:40   No growth at 5 days  --  --    Blood Blood-Peripheral  05-15 @ 06:30   No growth at 5 days  --  --    RADIOLOGY:  no new studies

## 2025-05-23 NOTE — DISCHARGE NOTE NURSING/CASE MANAGEMENT/SOCIAL WORK - NSDCPEFALRISK_GEN_ALL_CORE
For information on Fall & Injury Prevention, visit: [email or web address] OR  [email or web address] OR  https://www.cdc.gov/steadi/patient.html

## 2025-05-23 NOTE — PROGRESS NOTE ADULT - PROVIDER SPECIALTY LIST ADULT
Infectious Disease
Internal Medicine
Palliative Care
Palliative Care
Infectious Disease
Infectious Disease
Internal Medicine
Palliative Care
Internal Medicine

## 2025-05-23 NOTE — DISCHARGE NOTE NURSING/CASE MANAGEMENT/SOCIAL WORK - PATIENT PORTAL LINK FT
You can access the FollowMyHealth Patient Portal offered by [facility] by registering at the following website: [email or web address]. By joining [facility]’s FollowMyHealth portal, you will also be able to view your health information using other applications (apps) compatible with our system.

## 2025-05-28 NOTE — DIETITIAN INITIAL EVALUATION ADULT - SIGNS/SYMPTOMS
----- Message from Jaquan Haney sent at 3/1/2023  9:37 AM CST -----  Contact: pt  Type: Requesting to speak with nurse        Who Called: PT  Regarding:  clarification on 04/4 appointment. States he received a message to schedule a wellness visit with  np.  Would the patient rather a call back or a response via [facility]? Call back  Best Call Back Number: [phone]  Additional Information:       Spoke to pt. AWV clarified.  Confirmed visit with Dr. Carr on 4/4/23 for physical.   Po intake ~ 25 % of meal x 5 days, Loss of Muscle mass + body fat loss ( MOD) 28-May-2025 16:37

## 2025-06-02 PROCEDURE — 86803 HEPATITIS C AB TEST: CPT

## 2025-06-02 PROCEDURE — 71250 CT THORAX DX C-: CPT

## 2025-06-02 PROCEDURE — 82962 GLUCOSE BLOOD TEST: CPT

## 2025-06-02 PROCEDURE — 83605 ASSAY OF LACTIC ACID: CPT

## 2025-06-02 PROCEDURE — 87641 MR-STAPH DNA AMP PROBE: CPT

## 2025-06-02 PROCEDURE — 70450 CT HEAD/BRAIN W/O DYE: CPT

## 2025-06-02 PROCEDURE — 87040 BLOOD CULTURE FOR BACTERIA: CPT

## 2025-06-02 PROCEDURE — 85025 COMPLETE CBC W/AUTO DIFF WBC: CPT

## 2025-06-02 PROCEDURE — 92610 EVALUATE SWALLOWING FUNCTION: CPT

## 2025-06-02 PROCEDURE — 96374 THER/PROPH/DIAG INJ IV PUSH: CPT

## 2025-06-02 PROCEDURE — 96375 TX/PRO/DX INJ NEW DRUG ADDON: CPT

## 2025-06-02 PROCEDURE — 80053 COMPREHEN METABOLIC PANEL: CPT

## 2025-06-02 PROCEDURE — 74176 CT ABD & PELVIS W/O CONTRAST: CPT

## 2025-06-02 PROCEDURE — 84100 ASSAY OF PHOSPHORUS: CPT

## 2025-06-02 PROCEDURE — 84145 PROCALCITONIN (PCT): CPT

## 2025-06-02 PROCEDURE — 94640 AIRWAY INHALATION TREATMENT: CPT

## 2025-06-02 PROCEDURE — 80048 BASIC METABOLIC PNL TOTAL CA: CPT

## 2025-06-02 PROCEDURE — 87640 STAPH A DNA AMP PROBE: CPT

## 2025-06-02 PROCEDURE — 86703 HIV-1/HIV-2 1 RESULT ANTBDY: CPT

## 2025-06-02 PROCEDURE — 36415 COLL VENOUS BLD VENIPUNCTURE: CPT

## 2025-06-02 PROCEDURE — 99285 EMERGENCY DEPT VISIT HI MDM: CPT

## 2025-06-02 PROCEDURE — 71045 X-RAY EXAM CHEST 1 VIEW: CPT

## 2025-06-02 PROCEDURE — 86738 MYCOPLASMA ANTIBODY: CPT

## 2025-06-02 PROCEDURE — 74230 X-RAY XM SWLNG FUNCJ C+: CPT

## 2025-06-02 PROCEDURE — 84484 ASSAY OF TROPONIN QUANT: CPT

## 2025-06-02 PROCEDURE — 92611 MOTION FLUOROSCOPY/SWALLOW: CPT

## 2025-06-02 PROCEDURE — 85027 COMPLETE CBC AUTOMATED: CPT

## 2025-06-02 PROCEDURE — 81001 URINALYSIS AUTO W/SCOPE: CPT

## 2025-06-02 PROCEDURE — 83880 ASSAY OF NATRIURETIC PEPTIDE: CPT

## 2025-06-02 PROCEDURE — 97162 PT EVAL MOD COMPLEX 30 MIN: CPT

## 2025-06-02 PROCEDURE — 93005 ELECTROCARDIOGRAM TRACING: CPT

## 2025-06-02 PROCEDURE — 83735 ASSAY OF MAGNESIUM: CPT
